# Patient Record
Sex: FEMALE | Race: WHITE | Employment: OTHER | URBAN - METROPOLITAN AREA
[De-identification: names, ages, dates, MRNs, and addresses within clinical notes are randomized per-mention and may not be internally consistent; named-entity substitution may affect disease eponyms.]

---

## 2019-12-27 ENCOUNTER — HOSPITAL ENCOUNTER (OUTPATIENT)
Dept: MRI IMAGING | Age: 84
Discharge: HOME | End: 2019-12-27
Attending: ORTHOPAEDIC SURGERY

## 2019-12-27 DIAGNOSIS — M54.50 LOW BACK PAIN: ICD-10-CM

## 2019-12-27 DIAGNOSIS — M54.6 PAIN IN THORACIC SPINE: ICD-10-CM

## 2020-01-10 ENCOUNTER — HOSPITAL ENCOUNTER (OUTPATIENT)
Dept: MAMMOGRAPHY | Age: 85
Discharge: HOME | End: 2020-01-10
Payer: MEDICARE

## 2020-01-10 DIAGNOSIS — M81.0 OSTEOPOROSIS: ICD-10-CM

## 2020-01-10 PROCEDURE — 77081 DXA BONE DENSITY APPENDICULR: CPT

## 2020-07-01 ENCOUNTER — APPOINTMENT (OUTPATIENT)
Dept: CT IMAGING | Age: 85
End: 2020-07-01
Attending: EMERGENCY MEDICINE
Payer: MEDICARE

## 2020-07-01 ENCOUNTER — HOSPITAL ENCOUNTER (EMERGENCY)
Age: 85
Discharge: HOME | End: 2020-07-01
Attending: EMERGENCY MEDICINE
Payer: MEDICARE

## 2020-07-01 VITALS
BODY MASS INDEX: 25.21 KG/M2 | HEART RATE: 69 BPM | OXYGEN SATURATION: 98 % | DIASTOLIC BLOOD PRESSURE: 92 MMHG | SYSTOLIC BLOOD PRESSURE: 160 MMHG | RESPIRATION RATE: 21 BRPM | TEMPERATURE: 98.1 F | WEIGHT: 160.94 LBS

## 2020-07-01 DIAGNOSIS — R42 VERTIGO: ICD-10-CM

## 2020-07-01 DIAGNOSIS — M54.50 ACUTE MIDLINE LOW BACK PAIN WITHOUT SCIATICA: Primary | ICD-10-CM

## 2020-07-01 PROCEDURE — 74011000250 HC RX REV CODE- 250: Performed by: EMERGENCY MEDICINE

## 2020-07-01 PROCEDURE — 72131 CT LUMBAR SPINE W/O DYE: CPT

## 2020-07-01 PROCEDURE — 99285 EMERGENCY DEPT VISIT HI MDM: CPT

## 2020-07-01 PROCEDURE — 74011250637 HC RX REV CODE- 250/637: Performed by: EMERGENCY MEDICINE

## 2020-07-01 PROCEDURE — 93005 ELECTROCARDIOGRAM TRACING: CPT

## 2020-07-01 RX ORDER — ACETAMINOPHEN 325 MG/1
650 TABLET ORAL
Qty: 20 TAB | Refills: 0 | Status: SHIPPED | OUTPATIENT
Start: 2020-07-01 | End: 2020-12-23

## 2020-07-01 RX ORDER — LIDOCAINE 4 G/100G
1 PATCH TOPICAL EVERY 24 HOURS
Status: DISCONTINUED | OUTPATIENT
Start: 2020-07-01 | End: 2020-07-01 | Stop reason: HOSPADM

## 2020-07-01 RX ORDER — MECLIZINE HYDROCHLORIDE 25 MG/1
25 TABLET ORAL
Qty: 20 TAB | Refills: 0 | Status: SHIPPED | OUTPATIENT
Start: 2020-07-01 | End: 2020-07-01

## 2020-07-01 RX ORDER — LIDOCAINE 4 G/100G
1 PATCH TOPICAL EVERY 12 HOURS
Qty: 10 PATCH | Refills: 0 | Status: SHIPPED | OUTPATIENT
Start: 2020-07-01 | End: 2020-12-23

## 2020-07-01 RX ORDER — TRAMADOL HYDROCHLORIDE 50 MG/1
50 TABLET ORAL
Qty: 10 TAB | Refills: 0 | Status: SHIPPED | OUTPATIENT
Start: 2020-07-01 | End: 2020-07-04

## 2020-07-01 RX ORDER — TRAMADOL HYDROCHLORIDE 50 MG/1
50 TABLET ORAL
Status: COMPLETED | OUTPATIENT
Start: 2020-07-01 | End: 2020-07-01

## 2020-07-01 RX ORDER — ACETAMINOPHEN 325 MG/1
650 TABLET ORAL
Status: COMPLETED | OUTPATIENT
Start: 2020-07-01 | End: 2020-07-01

## 2020-07-01 RX ORDER — MECLIZINE HYDROCHLORIDE 25 MG/1
25 TABLET ORAL
Qty: 20 TAB | Refills: 0 | Status: SHIPPED | OUTPATIENT
Start: 2020-07-01 | End: 2020-07-02 | Stop reason: SDUPTHER

## 2020-07-01 RX ORDER — ACETAMINOPHEN 325 MG/1
650 TABLET ORAL
Qty: 20 TAB | Refills: 0 | Status: SHIPPED | OUTPATIENT
Start: 2020-07-01 | End: 2020-07-01

## 2020-07-01 RX ADMIN — TRAMADOL HYDROCHLORIDE 50 MG: 50 TABLET, FILM COATED ORAL at 17:57

## 2020-07-01 RX ADMIN — ACETAMINOPHEN 650 MG: 325 TABLET, FILM COATED ORAL at 16:36

## 2020-07-01 NOTE — ED TRIAGE NOTES
Pt presents to ED from assisted living with complaints of right lower back pain and fall. Unsure if pain was before or after fall.  Denies LOC

## 2020-07-01 NOTE — ED NOTES
AVS reviewed with pt/family who verbalized understanding. All pt concerns addressed and questions answered. Pt awaiting son to pick her up at this time.

## 2020-07-01 NOTE — DISCHARGE INSTRUCTIONS
Patient Education      You were seen for acute on chronic back pain. CT did not reveal any new signs of fractures. It just showed an old L2 compression fracture status post kyphoplasty. Her EKG came back normal with no signs of any heart issues. Your physical exam did not reveal any signs of a stroke. Regarding the dizziness that you have been having off-and-on, most likely due to peripheral vertigo. Take meclizine whenever needed. Back Pain: Care Instructions  Your Care Instructions     Back pain has many possible causes. It is often related to problems with muscles and ligaments of the back. It may also be related to problems with the nerves, discs, or bones of the back. Moving, lifting, standing, sitting, or sleeping in an awkward way can strain the back. Sometimes you don't notice the injury until later. Arthritis is another common cause of back pain. Although it may hurt a lot, back pain usually improves on its own within several weeks. Most people recover in 12 weeks or less. Using good home treatment and being careful not to stress your back can help you feel better sooner. Follow-up care is a key part of your treatment and safety. Be sure to make and go to all appointments, and call your doctor if you are having problems. It's also a good idea to know your test results and keep a list of the medicines you take. How can you care for yourself at home? · Sit or lie in positions that are most comfortable and reduce your pain. Try one of these positions when you lie down:  ? Lie on your back with your knees bent and supported by large pillows. ? Lie on the floor with your legs on the seat of a sofa or chair. ? Lie on your side with your knees and hips bent and a pillow between your legs. ? Lie on your stomach if it does not make pain worse. · Do not sit up in bed, and avoid soft couches and twisted positions. Bed rest can help relieve pain at first, but it delays healing.  Avoid bed rest after the first day of back pain. · Change positions every 30 minutes. If you must sit for long periods of time, take breaks from sitting. Get up and walk around, or lie in a comfortable position. · Try using a heating pad on a low or medium setting for 15 to 20 minutes every 2 or 3 hours. Try a warm shower in place of one session with the heating pad. · You can also try an ice pack for 10 to 15 minutes every 2 to 3 hours. Put a thin cloth between the ice pack and your skin. · Take pain medicines exactly as directed. ? If the doctor gave you a prescription medicine for pain, take it as prescribed. ? If you are not taking a prescription pain medicine, ask your doctor if you can take an over-the-counter medicine. · Take short walks several times a day. You can start with 5 to 10 minutes, 3 or 4 times a day, and work up to longer walks. Walk on level surfaces and avoid hills and stairs until your back is better. · Return to work and other activities as soon as you can. Continued rest without activity is usually not good for your back. · To prevent future back pain, do exercises to stretch and strengthen your back and stomach. Learn how to use good posture, safe lifting techniques, and proper body mechanics. When should you call for help? Call your doctor now or seek immediate medical care if:  · You have new or worsening numbness in your legs. · You have new or worsening weakness in your legs. (This could make it hard to stand up.)  · You lose control of your bladder or bowels. Watch closely for changes in your health, and be sure to contact your doctor if:  · You have a fever, lose weight, or don't feel well. · You do not get better as expected. Where can you learn more? Go to http://www.[employer].com/  Enter I594 in the search box to learn more about \"Back Pain: Care Instructions. \"  Current as of: March 2, 2020               Content Version: 12.5  © 5862-1596 [employer]. Care instructions adapted under license by [employer] (which disclaims liability or warranty for this information). If you have questions about a medical condition or this instruction, always ask your healthcare professional. [address] any warranty or liability for your use of this information.

## 2020-07-01 NOTE — ED PROVIDER NOTES
EMERGENCY DEPARTMENT HISTORY AND PHYSICAL EXAM      Date: 7/1/2020  Patient Name: Keeley Gonzalez  Patient Age and Sex: 80 y.o. female     History of Presenting Illness     Chief Complaint   Patient presents with    Fall       History Provided By: Patient    HPI: Keeley Gonzalez is a 5year-old female with a history of kyphoscoliosis, rheumatoid arthritis, presenting with back pain. Patient states that over the past 3 weeks has been having lower back pain worse with movement. States that yesterday she had gotten up in the middle of the night because she was having terrible back pain and when she got up, the pain was bad and brought her to her knees. Patient states that she fell to the ground because of the pain. Did experience some dizziness that she states felt like her equilibrium was off. Patient states that she has had this problem twice before but no dizziness today. Because of the back pain the assisted living home decided to bring her to the ER. EMS reported no issues for them. Her [place] score was 0 and her glucose was normal at 103. She has been alert and talkative for them. Has been just complaining of back pain. According to them, daughter did state that she had some lumbar surgery many years ago. There are no other complaints, changes, or physical findings at this time. PCP: Edison Waite MD    No current facility-administered medications on file prior to encounter. Current Outpatient Medications on File Prior to Encounter   Medication Sig Dispense Refill    calcium-vitamin D (OYSTER SHELL) 500 mg(1,250mg) -200 unit per tablet Take 1 Tab by mouth two (2) times daily (with meals). 60 Tab 1    polyethylene glycol (MIRALAX) 17 gram packet Take 1 Packet by mouth daily. 30 Each 1    levothyroxine (SYNTHROID) 125 mcg tablet Take 1 Tab by mouth Daily (before breakfast). 30 Tab 1    carvedilol (COREG) 3.125 mg tablet Take 1 Tab by mouth two (2) times daily (with meals).  61 Tab 1    [DISCONTINUED] acetaminophen (TYLENOL) 325 mg tablet Take 2 Tabs by mouth every four (4) hours as needed. 40 Tab 0    [DISCONTINUED] oxyCODONE-acetaminophen (PERCOCET) 5-325 mg per tablet Take 1 Tab by mouth every six (6) hours as needed. Max Daily Amount: 4 Tabs. 30 Tab 0       Past History     Past Medical History:  Past Medical History:   Diagnosis Date    Arthritis     Broken leg     Right leg    Dyspnea     Enlarged heart     Fracture     Hypercholesterolemia     Hyperglycemia     Hypothyroidism     Kyphoscoliosis     Malignant neoplasm (HCC)     of kidney, except pelvis    Obstructive sleep apnea     Osteoarthritis     Pain, abdominal     RA (rheumatoid arthritis) (HCC)     Renal cell carcinoma (HCC)        Past Surgical History:  Past Surgical History:   Procedure Laterality Date    ENDOSCOPY, COLON, DIAGNOSTIC      HX BREAST LUMPECTOMY      x 4, No cancer found    HX CARPAL TUNNEL RELEASE      Left hand    HX CATARACT REMOVAL      both eyes    HX NEPHRECTOMY      kidney cancer    HX PREMALIG/BENIGN SKIN LESION EXCISION      precancerous growth removed from back; 2 masses removed from hand and arm    HX THYROIDECTOMY      XR ARTHRO SHOULDER LT       both shoulders       Family History:  Family History   Problem Relation Age of Onset    Colon Cancer Mother     Diabetes Father     Pacemaker Brother     Heart Attack Brother     Diabetes Brother     Heart Disease Brother        Social History:  Social History     Tobacco Use    Smoking status: Former Smoker     Packs/day: 0.75     Years: 9.00     Pack years: 6.75     Last attempt to quit: 1962     Years since quittin.5    Smokeless tobacco: Never Used    Tobacco comment: secondhand smoke exposure for many years from    Substance Use Topics    Alcohol use: Yes     Comment: Very seldom.  Drug use: No       Allergies:   Allergies   Allergen Reactions    Ismo [Isosorbide Mononitrate] Not Reported This Time    Penicillins Unknown (comments)    Plaquenil [Hydroxychloroquine] Other (comments)     Lost a lot of her skin. Really bad.  Prednisone Itching     Very itchy. Review of Systems   Review of Systems   Constitutional: Negative for chills and fever. Respiratory: Negative for cough and shortness of breath. Cardiovascular: Negative for chest pain. Gastrointestinal: Negative for abdominal pain, constipation, diarrhea, nausea and vomiting. Genitourinary: Negative for dysuria, frequency and hematuria. Musculoskeletal: Positive for back pain. Negative for neck pain and neck stiffness. Neurological: Positive for dizziness. Negative for weakness and numbness. All other systems reviewed and are negative. Physical Exam   Physical Exam  Vitals signs and nursing note reviewed. Constitutional:       Appearance: She is well-developed. HENT:      Head: Normocephalic and atraumatic. Eyes:      Conjunctiva/sclera: Conjunctivae normal.   Neck:      Musculoskeletal: Normal range of motion and neck supple. Cardiovascular:      Rate and Rhythm: Normal rate and regular rhythm. Pulmonary:      Effort: Pulmonary effort is normal. No respiratory distress. Breath sounds: Normal breath sounds. Abdominal:      General: There is no distension. Palpations: Abdomen is soft. Tenderness: There is no abdominal tenderness. Musculoskeletal: Normal range of motion. Comments: Tender to palpation over her entire lumbar spine. No tenderness paraspinal over the muscles. Pain elicited from going from supine to sitting in bed. Skin:     General: Skin is warm and dry. Neurological:      General: No focal deficit present. Mental Status: She is alert and oriented to person, place, and time. Mental status is at baseline. Comments: Patient has 5 out of 5 strength in all 4 extremities, heel-to-shin is normal.  Finger-to-nose intact.    Psychiatric:         Mood and Affect: Mood normal.          Diagnostic Study Results     Labs -     Recent Results (from the past 12 hour(s))   EKG, 12 LEAD, INITIAL    Collection Time: 07/01/20  4:47 PM   Result Value Ref Range    Ventricular Rate 70 BPM    Atrial Rate 72 BPM    QRS Duration 74 ms    Q-T Interval 362 ms    QTC Calculation (Bezet) 390 ms    Calculated R Axis 30 degrees    Calculated T Axis -4 degrees    Diagnosis       Atrial fibrillation  Septal infarct , age undetermined  When compared with ECG of 05-OCT-2016 16:50,  Atrial fibrillation has replaced Sinus rhythm  Septal infarct is now present         Radiologic Studies -   CT SPINE LUMB WO CONT   Final Result   IMPRESSION: No acute vertebral compression fracture demonstrated. Remote L2   vertebral compression fracture and degenerative findings as above. CT Results  (Last 48 hours)               07/01/20 1630  CT SPINE LUMB WO CONT Final result    Impression:  IMPRESSION: No acute vertebral compression fracture demonstrated. Remote L2   vertebral compression fracture and degenerative findings as above. Narrative:  INDICATION: Status post fall. Right lower back pain. COMPARISON: MRI 10/4/2016, kyphoplasty images 10/5/2016       EXAM: Axial CT images of the lumbar spine with coronal and sagittal   reformations. CT dose reduction was achieved through use of a standardized   protocol tailored for this examination and automatic exposure control for dose   modulation. FINDINGS: The bones are severely osteopenic. Kyphoplasty cement is demonstrated   in the L2 vertebral body with mild-moderate loss of disc height involving the   superior endplate region. The other vertebral body heights are normal. Minimal   kyphotic inclination is shown at L1-2 with otherwise anatomic alignment. No paraspinal soft tissue mass is shown. The visualized superior portions of the   sacrum appear normal, with demonstration of mild bilateral SI joint   osteoarthrosis. There is mild disc space narrowing at L1-2, borderline-mild narrowing at L3-4   and L4-5, and moderate narrowing at L5-S1. Vacuum phenomenon, some endplate   sclerosis and tiny subendplate cysts are shown at L5-S1. There is moderate   bilateral facet osteoarthrosis at L5-S1 and mild osteoarthrosis at the other   visualized lumbar and lower thoracic spinal levels. No osseous canal stenosis is shown. Mild degenerative canal stenosis is shown at   L3-4, moderate stenosis at L4-5 and mild-moderate stenosis at L5-S1. There is   mild bilateral foraminal narrowing at L5-S1. CXR Results  (Last 48 hours)    None            Medical Decision Making   I am the first provider for this patient. I reviewed the vital signs, available nursing notes, past medical history, past surgical history, family history and social history. Vital Signs-Reviewed the patient's vital signs. Patient Vitals for the past 12 hrs:   Temp Pulse Resp BP SpO2   07/01/20 1600  69 21 (!) 160/92 98 %   07/01/20 1551 98.1 °F (36.7 °C) 67 18 (!) 161/93 99 %       Records Reviewed: Nursing Notes and Old Medical Records    Provider Notes (Medical Decision Making):   Patient presenting with lower back pain is been going on for the past 3 weeks but worse enough now to bring her to a fall. Also describing some dizziness and vertigo though not happening right now. She has a benign physical exam not concerning for any central cause of her vertigo. Will get CT of her lumbar spine and continue to monitor her pain. No active vertigo that needs medication or work-up at this point. ED Course:   Initial assessment performed. The patients presenting problems have been discussed, and they are in agreement with the care plan formulated and outlined with them. I have encouraged them to ask questions as they arise throughout their visit.     ED Course as of Jul 01 2210   Wed Jul 01, 2020   1701 Spoke with patient's daughter who reiterated the same thing. I told her we will check an EKG to make sure her heart was okay. Patient's daughter is out of town so son will be the one taking her out. [JS]   1701 EKG interpreted by me. Shows atrial fibrillation with a rate of 70. No ST elevations depressions concerning for ischemia. [JS]      ED Course User Index  [JS] Bianca Lema MD     Critical Care Time:   0    Disposition:  Discharge Note:  The patient has been re-evaluated and is ready for discharge. Reviewed available results with patient. Counseled patient on diagnosis and care plan. Patient has expressed understanding, and all questions have been answered. Patient agrees with plan and agrees to follow up as recommended, or to return to the ED if their symptoms worsen. Discharge instructions have been provided and explained to the patient, along with reasons to return to the ED. PLAN:  Discharge Medication List as of 7/1/2020  5:24 PM      START taking these medications    Details   meclizine (ANTIVERT) 25 mg tablet Take 1 Tab by mouth three (3) times daily as needed for Dizziness., Normal, Disp-20 Tab, R-0         CONTINUE these medications which have CHANGED    Details   acetaminophen (TYLENOL) 325 mg tablet Take 2 Tabs by mouth every six (6) hours as needed for Pain., Normal, Disp-20 Tab, R-0         CONTINUE these medications which have NOT CHANGED    Details   calcium-vitamin D (OYSTER SHELL) 500 mg(1,250mg) -200 unit per tablet Take 1 Tab by mouth two (2) times daily (with meals). , Print, Disp-60 Tab, R-1      polyethylene glycol (MIRALAX) 17 gram packet Take 1 Packet by mouth daily. , Print, Disp-30 Each, R-1      levothyroxine (SYNTHROID) 125 mcg tablet Take 1 Tab by mouth Daily (before breakfast). , Print, Disp-30 Tab, R-1      carvedilol (COREG) 3.125 mg tablet Take 1 Tab by mouth two (2) times daily (with meals). , Print, Disp-60 Tab, R-1      oxyCODONE-acetaminophen (PERCOCET) 5-325 mg per tablet Take 1 Tab by mouth every six (6) hours as needed. Max Daily Amount: 4 Tabs., Print, Disp-30 Tab, R-0           2. Follow-up Information     Follow up With Specialties Details Why Contact Info    Yoon Grubbs MD Internal Medicine  As needed [facility]  [place] [phone]  [phone]          3. Return to ED if worse     Diagnosis     Clinical Impression:   1. Acute midline low back pain without sciatica    2. Vertigo        Attestations:    Chelita Penny M.D. Please note that this dictation was completed with [employer], the [employer] voice recognition software. Quite often unanticipated grammatical, syntax, homophones, and other interpretive errors are inadvertently transcribed by the computer software. Please disregard these errors. Please excuse any errors that have escaped final proofreading. Thank you.

## 2020-07-02 LAB
ATRIAL RATE: 72 BPM
CALCULATED R AXIS, ECG10: 30 DEGREES
CALCULATED T AXIS, ECG11: -4 DEGREES
DIAGNOSIS, 93000: NORMAL
Q-T INTERVAL, ECG07: 362 MS
QRS DURATION, ECG06: 74 MS
QTC CALCULATION (BEZET), ECG08: 390 MS
VENTRICULAR RATE, ECG03: 70 BPM

## 2020-07-02 RX ORDER — MECLIZINE HYDROCHLORIDE 25 MG/1
25 TABLET ORAL
Qty: 20 TAB | Refills: 0 | Status: SHIPPED | OUTPATIENT
Start: 2020-07-02 | End: 2020-12-23

## 2020-12-22 ENCOUNTER — APPOINTMENT (OUTPATIENT)
Dept: GENERAL RADIOLOGY | Age: 85
DRG: 273 | End: 2020-12-22
Attending: EMERGENCY MEDICINE
Payer: MEDICARE

## 2020-12-22 ENCOUNTER — HOSPITAL ENCOUNTER (INPATIENT)
Age: 85
LOS: 3 days | Discharge: HOME HEALTH | DRG: 273 | End: 2020-12-25
Attending: EMERGENCY MEDICINE | Admitting: INTERNAL MEDICINE
Payer: MEDICARE

## 2020-12-22 DIAGNOSIS — I48.92 ATRIAL FLUTTER, UNSPECIFIED TYPE (HCC): ICD-10-CM

## 2020-12-22 DIAGNOSIS — I48.92 ATRIAL FLUTTER WITH RAPID VENTRICULAR RESPONSE (HCC): Primary | ICD-10-CM

## 2020-12-22 DIAGNOSIS — F03.91 DEMENTIA WITH BEHAVIORAL DISTURBANCE, UNSPECIFIED DEMENTIA TYPE: ICD-10-CM

## 2020-12-22 DIAGNOSIS — I42.0 DILATED CARDIOMYOPATHY (HCC): ICD-10-CM

## 2020-12-22 LAB
ALBUMIN SERPL-MCNC: 3.4 G/DL (ref 3.5–5)
ALBUMIN/GLOB SERPL: 0.9 {RATIO} (ref 1.1–2.2)
ALP SERPL-CCNC: 108 U/L (ref 45–117)
ALT SERPL-CCNC: 18 U/L (ref 12–78)
ANION GAP SERPL CALC-SCNC: 2 MMOL/L (ref 5–15)
APPEARANCE UR: CLEAR
AST SERPL-CCNC: 17 U/L (ref 15–37)
BACTERIA URNS QL MICRO: NEGATIVE /HPF
BASOPHILS # BLD: 0.1 K/UL (ref 0–0.1)
BASOPHILS NFR BLD: 1 % (ref 0–1)
BILIRUB SERPL-MCNC: 0.4 MG/DL (ref 0.2–1)
BILIRUB UR QL: NEGATIVE
BUN SERPL-MCNC: 19 MG/DL (ref 6–20)
BUN/CREAT SERPL: 14 (ref 12–20)
CALCIUM SERPL-MCNC: 8.9 MG/DL (ref 8.5–10.1)
CHLORIDE SERPL-SCNC: 104 MMOL/L (ref 97–108)
CO2 SERPL-SCNC: 30 MMOL/L (ref 21–32)
COLOR UR: ABNORMAL
COMMENT, HOLDF: NORMAL
CREAT SERPL-MCNC: 1.37 MG/DL (ref 0.55–1.02)
DIFFERENTIAL METHOD BLD: NORMAL
EOSINOPHIL # BLD: 0.1 K/UL (ref 0–0.4)
EOSINOPHIL NFR BLD: 2 % (ref 0–7)
EPITH CASTS URNS QL MICRO: ABNORMAL /LPF
ERYTHROCYTE [DISTWIDTH] IN BLOOD BY AUTOMATED COUNT: 13.3 % (ref 11.5–14.5)
GLOBULIN SER CALC-MCNC: 3.9 G/DL (ref 2–4)
GLUCOSE SERPL-MCNC: 74 MG/DL (ref 65–100)
GLUCOSE UR STRIP.AUTO-MCNC: NEGATIVE MG/DL
HCT VFR BLD AUTO: 41.2 % (ref 35–47)
HGB BLD-MCNC: 13.3 G/DL (ref 11.5–16)
HGB UR QL STRIP: NEGATIVE
HYALINE CASTS URNS QL MICRO: ABNORMAL /LPF (ref 0–5)
IMM GRANULOCYTES # BLD AUTO: 0 K/UL (ref 0–0.04)
IMM GRANULOCYTES NFR BLD AUTO: 0 % (ref 0–0.5)
KETONES UR QL STRIP.AUTO: NEGATIVE MG/DL
LEUKOCYTE ESTERASE UR QL STRIP.AUTO: ABNORMAL
LYMPHOCYTES # BLD: 1.2 K/UL (ref 0.8–3.5)
LYMPHOCYTES NFR BLD: 18 % (ref 12–49)
MCH RBC QN AUTO: 29.5 PG (ref 26–34)
MCHC RBC AUTO-ENTMCNC: 32.3 G/DL (ref 30–36.5)
MCV RBC AUTO: 91.4 FL (ref 80–99)
MONOCYTES # BLD: 0.5 K/UL (ref 0–1)
MONOCYTES NFR BLD: 8 % (ref 5–13)
NEUTS SEG # BLD: 4.7 K/UL (ref 1.8–8)
NEUTS SEG NFR BLD: 71 % (ref 32–75)
NITRITE UR QL STRIP.AUTO: NEGATIVE
NRBC # BLD: 0 K/UL (ref 0–0.01)
NRBC BLD-RTO: 0 PER 100 WBC
PH UR STRIP: 6 [PH] (ref 5–8)
PLATELET # BLD AUTO: 290 K/UL (ref 150–400)
PMV BLD AUTO: 9.6 FL (ref 8.9–12.9)
POTASSIUM SERPL-SCNC: 4.3 MMOL/L (ref 3.5–5.1)
PROT SERPL-MCNC: 7.3 G/DL (ref 6.4–8.2)
PROT UR STRIP-MCNC: NEGATIVE MG/DL
RBC # BLD AUTO: 4.51 M/UL (ref 3.8–5.2)
RBC #/AREA URNS HPF: ABNORMAL /HPF (ref 0–5)
SAMPLES BEING HELD,HOLD: NORMAL
SODIUM SERPL-SCNC: 136 MMOL/L (ref 136–145)
SP GR UR REFRACTOMETRY: 1.01 (ref 1–1.03)
TROPONIN I SERPL-MCNC: <0.05 NG/ML
TSH SERPL DL<=0.05 MIU/L-ACNC: 0.53 UIU/ML (ref 0.36–3.74)
UA: UC IF INDICATED,UAUC: ABNORMAL
UROBILINOGEN UR QL STRIP.AUTO: 0.2 EU/DL (ref 0.2–1)
WBC # BLD AUTO: 6.6 K/UL (ref 3.6–11)
WBC URNS QL MICRO: ABNORMAL /HPF (ref 0–4)

## 2020-12-22 PROCEDURE — 81001 URINALYSIS AUTO W/SCOPE: CPT

## 2020-12-22 PROCEDURE — 84443 ASSAY THYROID STIM HORMONE: CPT

## 2020-12-22 PROCEDURE — 96366 THER/PROPH/DIAG IV INF ADDON: CPT

## 2020-12-22 PROCEDURE — 94761 N-INVAS EAR/PLS OXIMETRY MLT: CPT

## 2020-12-22 PROCEDURE — 74011250636 HC RX REV CODE- 250/636: Performed by: EMERGENCY MEDICINE

## 2020-12-22 PROCEDURE — 74011000250 HC RX REV CODE- 250: Performed by: EMERGENCY MEDICINE

## 2020-12-22 PROCEDURE — 96365 THER/PROPH/DIAG IV INF INIT: CPT

## 2020-12-22 PROCEDURE — 93005 ELECTROCARDIOGRAM TRACING: CPT

## 2020-12-22 PROCEDURE — 71045 X-RAY EXAM CHEST 1 VIEW: CPT

## 2020-12-22 PROCEDURE — 80053 COMPREHEN METABOLIC PANEL: CPT

## 2020-12-22 PROCEDURE — 36415 COLL VENOUS BLD VENIPUNCTURE: CPT

## 2020-12-22 PROCEDURE — 85025 COMPLETE CBC W/AUTO DIFF WBC: CPT

## 2020-12-22 PROCEDURE — 74011250636 HC RX REV CODE- 250/636: Performed by: INTERNAL MEDICINE

## 2020-12-22 PROCEDURE — 99285 EMERGENCY DEPT VISIT HI MDM: CPT

## 2020-12-22 PROCEDURE — 84484 ASSAY OF TROPONIN QUANT: CPT

## 2020-12-22 PROCEDURE — 96376 TX/PRO/DX INJ SAME DRUG ADON: CPT

## 2020-12-22 RX ORDER — SODIUM CHLORIDE 0.9 % (FLUSH) 0.9 %
5-40 SYRINGE (ML) INJECTION EVERY 8 HOURS
Status: DISCONTINUED | OUTPATIENT
Start: 2020-12-22 | End: 2020-12-25 | Stop reason: HOSPADM

## 2020-12-22 RX ORDER — ENOXAPARIN SODIUM 100 MG/ML
1 INJECTION SUBCUTANEOUS EVERY 24 HOURS
Status: COMPLETED | OUTPATIENT
Start: 2020-12-22 | End: 2020-12-22

## 2020-12-22 RX ORDER — FACIAL-BODY WIPES
10 EACH TOPICAL DAILY PRN
Status: DISCONTINUED | OUTPATIENT
Start: 2020-12-22 | End: 2020-12-25 | Stop reason: HOSPADM

## 2020-12-22 RX ORDER — DILTIAZEM HYDROCHLORIDE 5 MG/ML
0.35 INJECTION INTRAVENOUS
Status: COMPLETED | OUTPATIENT
Start: 2020-12-22 | End: 2020-12-22

## 2020-12-22 RX ORDER — CARVEDILOL 3.12 MG/1
3.12 TABLET ORAL 2 TIMES DAILY WITH MEALS
Status: DISCONTINUED | OUTPATIENT
Start: 2020-12-23 | End: 2020-12-25 | Stop reason: HOSPADM

## 2020-12-22 RX ORDER — FUROSEMIDE 10 MG/ML
40 INJECTION INTRAMUSCULAR; INTRAVENOUS DAILY
Status: DISCONTINUED | OUTPATIENT
Start: 2020-12-23 | End: 2020-12-24

## 2020-12-22 RX ORDER — ACETAMINOPHEN 325 MG/1
650 TABLET ORAL
Status: DISCONTINUED | OUTPATIENT
Start: 2020-12-22 | End: 2020-12-25 | Stop reason: HOSPADM

## 2020-12-22 RX ORDER — ENOXAPARIN SODIUM 100 MG/ML
30 INJECTION SUBCUTANEOUS DAILY
Status: DISCONTINUED | OUTPATIENT
Start: 2020-12-23 | End: 2020-12-22

## 2020-12-22 RX ORDER — ACETAMINOPHEN 650 MG/1
650 SUPPOSITORY RECTAL
Status: DISCONTINUED | OUTPATIENT
Start: 2020-12-22 | End: 2020-12-25 | Stop reason: HOSPADM

## 2020-12-22 RX ORDER — ONDANSETRON 2 MG/ML
4 INJECTION INTRAMUSCULAR; INTRAVENOUS
Status: DISCONTINUED | OUTPATIENT
Start: 2020-12-22 | End: 2020-12-25 | Stop reason: HOSPADM

## 2020-12-22 RX ORDER — SODIUM CHLORIDE 0.9 % (FLUSH) 0.9 %
5-40 SYRINGE (ML) INJECTION AS NEEDED
Status: DISCONTINUED | OUTPATIENT
Start: 2020-12-22 | End: 2020-12-25 | Stop reason: SDUPTHER

## 2020-12-22 RX ORDER — LEVOTHYROXINE SODIUM 125 UG/1
125 TABLET ORAL
Status: DISCONTINUED | OUTPATIENT
Start: 2020-12-23 | End: 2020-12-23

## 2020-12-22 RX ORDER — DILTIAZEM HYDROCHLORIDE 5 MG/ML
15 INJECTION INTRAVENOUS
Status: COMPLETED | OUTPATIENT
Start: 2020-12-22 | End: 2020-12-22

## 2020-12-22 RX ORDER — POLYETHYLENE GLYCOL 3350 17 G/17G
17 POWDER, FOR SOLUTION ORAL DAILY
Status: DISCONTINUED | OUTPATIENT
Start: 2020-12-23 | End: 2020-12-25 | Stop reason: HOSPADM

## 2020-12-22 RX ADMIN — ENOXAPARIN SODIUM 70 MG: 40 INJECTION SUBCUTANEOUS at 21:37

## 2020-12-22 RX ADMIN — DEXTROSE MONOHYDRATE 2.5 MG/HR: 50 INJECTION, SOLUTION INTRAVENOUS at 13:37

## 2020-12-22 RX ADMIN — DEXTROSE MONOHYDRATE 15 MG/HR: 50 INJECTION, SOLUTION INTRAVENOUS at 20:19

## 2020-12-22 RX ADMIN — DEXTROSE MONOHYDRATE 10 MG/HR: 50 INJECTION, SOLUTION INTRAVENOUS at 15:17

## 2020-12-22 RX ADMIN — DILTIAZEM HYDROCHLORIDE 15 MG: 5 INJECTION INTRAVENOUS at 13:37

## 2020-12-22 RX ADMIN — SODIUM CHLORIDE 500 ML: 9 INJECTION, SOLUTION INTRAVENOUS at 16:40

## 2020-12-22 RX ADMIN — DILTIAZEM HYDROCHLORIDE 25.5 MG: 5 INJECTION INTRAVENOUS at 16:40

## 2020-12-22 RX ADMIN — Medication 10 ML: at 20:20

## 2020-12-22 RX ADMIN — Medication 10 ML: at 16:32

## 2020-12-22 NOTE — Clinical Note
TRANSFER - IN REPORT:     Verbal report received from: [facility] staff. Report consisted of patient's Situation, Background, Assessment and   Recommendations(SBAR). Opportunity for questions and clarification was provided. Assessment completed upon patient's arrival to unit and care assumed. Patient transported with a Registered Nurse.

## 2020-12-22 NOTE — Clinical Note
TRANSFER - OUT REPORT:     Verbal report given to: John Wallis. Report consisted of patient's Situation, Background, Assessment and   Recommendations(SBAR). Opportunity for questions and clarification was provided. Patient transported with a Registered Nurse, Monitor and Oxygen. Oxygen used for patient = nasal cannula, @ 2 - 6 Liters. Patient transported to: IVCU bed 2153.

## 2020-12-22 NOTE — ROUTINE PROCESS
Bedside and Verbal shift change report given to Mati Bonilla RN (oncoming nurse) by Sasha Solis RN (offgoing nurse). Report included the following information SBAR, Kardex, Intake/Output and MAR.

## 2020-12-22 NOTE — Clinical Note
Sheath #1: Sheath: inserted. Sheath inserted/placed in the right femoral  vein. Hemostasis achieved.  8fr RFV

## 2020-12-22 NOTE — ED TRIAGE NOTES
Pt reports to the ED via EMS with c/o of SOB and tachycardia(124bpm) reported by her caregiver that started earlier today. Pt is placed on the monitor x 3, placed in position of comfort with call bell within reach.

## 2020-12-22 NOTE — H&P
Hospitalist Admission Note    NAME:  Alexys Kendrick   :   1930   MRN:   [id number]     Date of admit:     PCP: Denice Reynolds MD    Assessment/Plan:     Atrial flutter with RVR POA  Acute on chronic systolic congestive heart failure LVEF 40 to 45% in 2016 POA  No prior history of atrial fib or flutter  Question during admission in 2016, ultimately seen by [facility] and felt to be sinus tach  She was started on low-dose Coreg  Past 24 hours reportedly increasingly short of breath, no chest pain  Brought to ER  Found to be in atrial flutter with RVR rates to 124  Now improved on Cardizem drip, heart rates in the 80s, means of flutter  Admit to stepdown  Resume p.o. Coreg  Lovenox 1 mg/kg sub-q  Cardiology consult, I spoke with [facility] earlier  Check echocardiogram  IV Lasix  Check proBNP  Normal TSH  Spoke with daughter, she is not sure how aggressive she wants to be with treatments  I asked her to speak with cardiology and decide on best options for her mom    Hypothyroidism on replacement POA  We will continue, current TSH 0.53    Stage 4 chronic kidney disease  Current GFR approximately 26.5  Creatinine approximately 1.3    Status post left nephrectomy for renal cell cancer    Possible early dementia  Not formally diagnosed, but daughter says confusion present  Oriented x2 in ED, daughter notes increasingly confused, maribel short-term  Normal TSH  Check B12 level    KAREN per daughter  Prior not CPAP, not using per daughter    Overweight POA Body mass index is 25.2 kg/m². Given the patient's current clinical presentation, I have a high level of concern for decompensation if discharged from the emergency department. My assessment of this patient's clinical condition and my plan of care is as noted above.     DVT prophylaxis with lovenox 1 mg/kg    Code status: DNR/DNI, d/w daughter at length, spoke with her for 15 minutes by phone  NOK: Daughter Jennifer Arias    History CHIEF COMPLAINT: Brought in by family with SOB x 1 day, found to have atrial flutter with RVR    HISTORY OF PRESENT ILLNESS:    80-year-old female    Hard of hearing, oriented currently only x 2, could provide limited history  Spoke with daughter Alicia Lujan to get additional history by phone    Patient lives in independent living facility with assistance with meals  Daughter has someone who checks on her daily  Physical therapy works with her    Daughter notes no formal diagnosis with dementia, but increasing problems with short-term memory  Also very hard of hearing    Last admission to [address] [place] [address] was in 2016  Severe low back pain and L2 compression fracture  Underwent kyphoplasty  Seen by [facility]ology for question of atrial fib  Ultimately felt the strips represented sinus tachycardia, was prescribed low-dose Coreg  Echo at that time had an LVEF of 40 to 45%    Patient currently denies complaints, she was not sure why she was brought into the emergency room  Daughter says physical therapy went to work with her today, found to have a very rapid heart rate, sent to ER  She may have had some increased shortness of breath  She currently denies headache, chest pain, shortness of breath, nausea vomiting, diarrhea, cough, fevers  No prior history of atrial fibrillation noted upon chart review    Emergency room  Satting well on room air   EKG with atrial flutter with heart rates to 130  Chest x-ray with no airspace disease  Started on Cardizem drip  Eventually heart rate controlled, when I saw heart rates were in the 80s but remained in atrial flutter  Currently without complaints  We were called to admit the patient    Past Medical History:   Diagnosis Date    Arthritis     Broken leg     Right leg    Dyspnea     Enlarged heart     Fracture     Hypercholesterolemia     Hyperglycemia     Hypothyroidism     Kyphoscoliosis     Malignant neoplasm ([address]     of kidney, except pelvis    Obstructive sleep apnea     Osteoarthritis     Pain, abdominal     RA (rheumatoid arthritis) (HCC)     Renal cell carcinoma (HCC)         Past Surgical History:   Procedure Laterality Date    ENDOSCOPY, COLON, DIAGNOSTIC      HX BREAST LUMPECTOMY      x 4, No cancer found    HX CARPAL TUNNEL RELEASE      Left hand    HX CATARACT REMOVAL      both eyes    HX NEPHRECTOMY      kidney cancer    HX PREMALIG/BENIGN SKIN LESION EXCISION      precancerous growth removed from back; 2 masses removed from hand and arm    HX THYROIDECTOMY      HX THYROIDECTOMY      XR ARTHRO SHOULDER LT       both shoulders       Social History     Tobacco Use    Smoking status: Former Smoker     Packs/day: 0.75     Years: 9.00     Pack years: 6.75     Quit date: 1962     Years since quittin.0    Smokeless tobacco: Never Used    Tobacco comment: secondhand smoke exposure for many years from    Substance Use Topics    Alcohol use: Yes     Comment: Very seldom. Family History   Problem Relation Age of Onset    Colon Cancer Mother     Diabetes Father     Pacemaker Brother     Heart Attack Brother     Diabetes Brother     Heart Disease Brother         Allergies   Allergen Reactions    Ismo [Isosorbide Mononitrate] Not Reported This Time    Penicillins Unknown (comments)    Plaquenil [Hydroxychloroquine] Other (comments)     Lost a lot of her skin. Really bad.  Prednisone Itching     Very itchy. Prior to Admission medications    Medication Sig Start Date End Date Taking? Authorizing Provider   meclizine (ANTIVERT) 25 mg tablet Take 1 Tab by mouth three (3) times daily as needed for Dizziness. 20   Ronit Naranjo MD   lidocaine 4 % patch 1 Patch by TransDERmal route every twelve (12) hours every twelve (12) hours. 20   Nat Cha MD   acetaminophen (TYLENOL) 325 mg tablet Take 2 Tabs by mouth every six (6) hours as needed for Pain.  20   Nat Cha MD   calcium-vitamin D (OYSTER SHELL) 500 mg(1,250mg) -200 unit per tablet Take 1 Tab by mouth two (2) times daily (with meals). 10/7/16   Zelalem Adler MD   polyethylene glycol (MIRALAX) 17 gram packet Take 1 Packet by mouth daily. 10/7/16   Zelalem Adler MD   levothyroxine (SYNTHROID) 125 mcg tablet Take 1 Tab by mouth Daily (before breakfast). 10/7/16   Zelalem Adler MD   carvedilol (COREG) 3.125 mg tablet Take 1 Tab by mouth two (2) times daily (with meals).  10/7/16   Zelalem Adler MD       Review of symptoms:     POSITIVE= Bold  Negative = not bold  General:  fever, chills, sweats, generalized weakness  Eyes:    blurred vision, eye pain, double vision  ENT:    Coryza, sore throat, trouble swallowing  Respiratory:   cough, sputum, SOB  Cardiology:   chest pain, orthopnea, PND, edema  Gastrointestinal:  abdominal pain , N/V, diarrhea, constipation, melena or BRBPR  Genitourinary:  Urgency, dysuria, hematuria  Muskuloskeletal :  Joint redness, swelling or acute joint pain, myalgias  Hematology:  easy bruising, nose or gum bleeding  Dermatological: rash, ulceration  Endocrine:   Polyuria or polydipsia, heat or hold intolerance  Neurological:  Headache, focal motor or sensory changes     Speech difficulties, memory loss  Psychological: depression, agitation      Objective:   VITALS:    Patient Vitals for the past 24 hrs:   Temp Pulse Resp BP SpO2   20 1515  (!) 120 28 131/79 96 %   20 1415  (!) 120 24 119/73 96 %   20 1400  (!) 121 30 123/75 97 %   20 1345  (!) 115 30 107/72 97 %   20 1343  (!) 118 25 (!) 147/90    20 1326  (!) 118 22 (!) 130/99 97 %   20 1218     99 %   20 1215  (!) 124 21 (!) 129/99    20 1203 98.6 °F (37 °C) (!) 124 23 (!) 133/94 98 %     Temp (24hrs), Av.6 °F (37 °C), Min:98.6 °F (37 °C), Max:98.6 °F (37 °C)      O2 Device: Room air    Wt Readings from Last 12 Encounters:   20 73 kg (160 lb 15 oz)   10/04/16 81.6 kg (180 lb) 12/01/15 81.6 kg (180 lb)   03/01/12 79.8 kg (176 lb)   08/28/12 79.4 kg (175 lb)   04/06/12 77.1 kg (170 lb)         PHYSICAL EXAM:     I had a face to face encounter and independently examined this patient on 12/22/2020 as outlined below:    General:    Alert, cooperative in no distress     HEENT: Normocephalic, atraumatic    PERRL, Sclera no icterus    Nasal mucosa without masses or discharge  Hearing intact to voice    Oropharynx without erythema or exudate  No thrush  Pink MM  Neck:  No meningismus, trachea midline, no carotid bruits     Thyroid not enlarged, no nodules or tenderness  Lungs:   Crackles at bases bilaterally. No wheezing    No accessory muscle use or retractions. Heart:   Irregular rate and rhythm,  no murmur or gallop.    trace LE edema  Abdomen:   Soft, non-tender. Not distended. Bowel sounds normal.     No masses, No Hepatosplenomegaly, No Rebound or guarding  Lymph nodes: No cervical or inguinal MELECIO  Musculoskeletal:  No Joint swelling, erythema, warmth.  No Cyanosis or clubbing  Skin:      No rashes      Not Jaundiced   No nodules or thickening    Capillary refill normal  Neurologic: Alert and oriented to birthday and place, follows commands      Not oriented to year, current president and president elect    Cranial nerves 2 to 12 intact    Symmetric motor strength bilaterally       LAB DATA REVIEWED:    Recent Results (from the past 12 hour(s))   EKG, 12 LEAD, INITIAL    Collection Time: 12/22/20 12:12 PM   Result Value Ref Range    Ventricular Rate 124 BPM    Atrial Rate 248 BPM    QRS Duration 80 ms    Q-T Interval 288 ms    QTC Calculation (Bezet) 413 ms    Calculated P Axis -96 degrees    Calculated R Axis 26 degrees    Calculated T Axis 173 degrees    Diagnosis       Atrial flutter with 2:1 AV conduction  Low voltage QRS  Cannot rule out Anteroseptal infarct (cited on or before 22-DEC-2020)  When compared with ECG of 01-JUL-2020 16:47,  Atrial flutter has replaced Sinus rhythm  Vent. rate has increased BY  54 BPM  Questionable change in initial forces of Anterior leads  ST now depressed in Inferior leads  T wave inversion no longer evident in Inferior leads  Nonspecific T wave abnormality now evident in Lateral leads     CBC WITH AUTOMATED DIFF    Collection Time: 12/22/20 12:22 PM   Result Value Ref Range    WBC 6.6 3.6 - 11.0 K/uL    RBC 4.51 3.80 - 5.20 M/uL    HGB 13.3 11.5 - 16.0 g/dL    HCT 41.2 35.0 - 47.0 %    MCV 91.4 80.0 - 99.0 FL    MCH 29.5 26.0 - 34.0 PG    MCHC 32.3 30.0 - 36.5 g/dL    RDW 13.3 11.5 - 14.5 %    PLATELET 975 774 - 533 K/uL    MPV 9.6 8.9 - 12.9 FL    NRBC 0.0 0  WBC    ABSOLUTE NRBC 0.00 0.00 - 0.01 K/uL    NEUTROPHILS 71 32 - 75 %    LYMPHOCYTES 18 12 - 49 %    MONOCYTES 8 5 - 13 %    EOSINOPHILS 2 0 - 7 %    BASOPHILS 1 0 - 1 %    IMMATURE GRANULOCYTES 0 0.0 - 0.5 %    ABS. NEUTROPHILS 4.7 1.8 - 8.0 K/UL    ABS. LYMPHOCYTES 1.2 0.8 - 3.5 K/UL    ABS. MONOCYTES 0.5 0.0 - 1.0 K/UL    ABS. EOSINOPHILS 0.1 0.0 - 0.4 K/UL    ABS. BASOPHILS 0.1 0.0 - 0.1 K/UL    ABS. IMM. GRANS. 0.0 0.00 - 0.04 K/UL    DF AUTOMATED     METABOLIC PANEL, COMPREHENSIVE    Collection Time: 12/22/20 12:22 PM   Result Value Ref Range    Sodium 136 136 - 145 mmol/L    Potassium 4.3 3.5 - 5.1 mmol/L    Chloride 104 97 - 108 mmol/L    CO2 30 21 - 32 mmol/L    Anion gap 2 (L) 5 - 15 mmol/L    Glucose 74 65 - 100 mg/dL    BUN 19 6 - 20 MG/DL    Creatinine 1.37 (H) 0.55 - 1.02 MG/DL    BUN/Creatinine ratio 14 12 - 20      GFR est AA 44 (L) >60 ml/min/1.73m2    GFR est non-AA 36 (L) >60 ml/min/1.73m2    Calcium 8.9 8.5 - 10.1 MG/DL    Bilirubin, total 0.4 0.2 - 1.0 MG/DL    ALT (SGPT) 18 12 - 78 U/L    AST (SGOT) 17 15 - 37 U/L    Alk.  phosphatase 108 45 - 117 U/L    Protein, total 7.3 6.4 - 8.2 g/dL    Albumin 3.4 (L) 3.5 - 5.0 g/dL    Globulin 3.9 2.0 - 4.0 g/dL    A-G Ratio 0.9 (L) 1.1 - 2.2     SAMPLES BEING HELD    Collection Time: 12/22/20 12:22 PM   Result Value Ref Range    SAMPLES BEING HELD  2 RED, BLUE, SST     COMMENT        Add-on orders for these samples will be processed based on acceptable specimen integrity and analyte stability, which may vary by analyte. TROPONIN I    Collection Time: 12/22/20 12:22 PM   Result Value Ref Range    Troponin-I, Qt. <0.05 <0.05 ng/mL   TSH 3RD GENERATION    Collection Time: 12/22/20 12:22 PM   Result Value Ref Range    TSH 0.53 0.36 - 3.74 uIU/mL       EKG as read by me shows atrial flutter with 2-1 block, heart rate 124 slight ST depression in inferior leads    CXR read by radiology and reviewed by myself results:  Cardiomediastinal silhouette is stable. Lungs are clear bilaterally. Pleural  spaces are normal. Osseous structures are diffusely demineralized, but intact. IMPRESSION: No acute cardiopulmonary disease. I saw the patient personally, took a history and did a complete physical exam at the bedside. I performed complex decision making in coming up with a diagnostic and treatment plan for the patient. I reviewed the patient's past medical records, current laboratory and radiology results, and actual Xray films/EKG. I have also discussed this case with the involved ED physician.     Care Plan discussed with:    Patient, Family, ED Doc    Risk of deterioration:  High    Total Time Coordinating Admission:  65   minutes    Total Critical Care Time:         Maryam Palma MD

## 2020-12-22 NOTE — Clinical Note
Sheath #1: sheath exchanged for INTRODUCER IRWIN GARNETT 0.038 IN 3 MM 8 FRX60 CM DIL FAST-CATH. Hemostasis achieved.  8fr sheath RFV removed/ SEPT advanced over package wire/ aspirated and flushed

## 2020-12-22 NOTE — ED PROVIDER NOTES
EMERGENCY DEPARTMENT HISTORY AND PHYSICAL EXAM      Date: 12/22/2020  Patient Name: Alexys Kendrick    History of Presenting Illness     Chief Complaint   Patient presents with    Shortness of Breath       History Provided By: Patient    HPI: Alexys Kendrick, 80 y.o. female with a past medical history significant for CHF, hypertension, hyperlipidemia, hypothyroidism, rheumatoid arthritis, renal cell cancer presents to the ED with cc of moderate to severe shortness of breath and tachycardia over the last 24 hours. Patient is coming from home and her caregiver says her heart rate has been very elevated and she is having trouble breathing with exertion. She denies any significant chest pain, fevers, chills, productive cough, leg swelling, or any other associated symptoms. No other exacerbating ameliorating factors. There are no other complaints, changes, or physical findings at this time. PCP: Denice Reynolds MD    No current facility-administered medications on file prior to encounter. Current Outpatient Medications on File Prior to Encounter   Medication Sig Dispense Refill    meclizine (ANTIVERT) 25 mg tablet Take 1 Tab by mouth three (3) times daily as needed for Dizziness. 20 Tab 0    lidocaine 4 % patch 1 Patch by TransDERmal route every twelve (12) hours every twelve (12) hours. 10 Patch 0    acetaminophen (TYLENOL) 325 mg tablet Take 2 Tabs by mouth every six (6) hours as needed for Pain. 20 Tab 0    calcium-vitamin D (OYSTER SHELL) 500 mg(1,250mg) -200 unit per tablet Take 1 Tab by mouth two (2) times daily (with meals). 60 Tab 1    polyethylene glycol (MIRALAX) 17 gram packet Take 1 Packet by mouth daily. 30 Each 1    levothyroxine (SYNTHROID) 125 mcg tablet Take 1 Tab by mouth Daily (before breakfast). 30 Tab 1    carvedilol (COREG) 3.125 mg tablet Take 1 Tab by mouth two (2) times daily (with meals).  61 Tab 1       Past History     Past Medical History:  Past Medical History: Diagnosis Date    Arthritis     Broken leg     Right leg    Dyspnea     Enlarged heart     Fracture     Hypercholesterolemia     Hyperglycemia     Hypothyroidism     Kyphoscoliosis     Malignant neoplasm (HCC)     of kidney, except pelvis    Obstructive sleep apnea     Osteoarthritis     Pain, abdominal     RA (rheumatoid arthritis) ([address]     Renal cell carcinoma (HCC)        Past Surgical History:  Past Surgical History:   Procedure Laterality Date    ENDOSCOPY, COLON, DIAGNOSTIC      HX BREAST LUMPECTOMY      x 4, No cancer found    HX CARPAL TUNNEL RELEASE      Left hand    HX CATARACT REMOVAL      both eyes    HX NEPHRECTOMY      kidney cancer    HX PREMALIG/BENIGN SKIN LESION EXCISION      precancerous growth removed from back; 2 masses removed from hand and arm    HX THYROIDECTOMY      HX THYROIDECTOMY      XR ARTHRO SHOULDER LT       both shoulders       Family History:  Family History   Problem Relation Age of Onset    Colon Cancer Mother     Diabetes Father     Pacemaker Brother     Heart Attack Brother     Diabetes Brother     Heart Disease Brother        Social History:  Social History     Tobacco Use    Smoking status: Former Smoker     Packs/day: 0.75     Years: 9.00     Pack years: 6.75     Quit date: 1962     Years since quittin.0    Smokeless tobacco: Never Used    Tobacco comment: secondhand smoke exposure for many years from    Substance Use Topics    Alcohol use: Yes     Comment: Very seldom.  Drug use: No       Allergies: Allergies   Allergen Reactions    Ismo [Isosorbide Mononitrate] Not Reported This Time    Penicillins Unknown (comments)    Plaquenil [Hydroxychloroquine] Other (comments)     Lost a lot of her skin. Really bad.  Prednisone Itching     Very itchy.          Review of Systems   Review of Systems   Unable to perform ROS: Mental status change       Physical Exam   Physical Exam  Vitals signs and nursing note reviewed. Constitutional:       General: She is in acute distress. Appearance: She is not ill-appearing. HENT:      Head: Normocephalic and atraumatic. Mouth/Throat:      Pharynx: No oropharyngeal exudate. Eyes:      Conjunctiva/sclera: Conjunctivae normal.      Pupils: Pupils are equal, round, and reactive to light. Neck:      Musculoskeletal: Normal range of motion. Cardiovascular:      Rate and Rhythm: Tachycardia present. Rhythm irregular. Heart sounds: No murmur. Pulmonary:      Effort: Pulmonary effort is normal. No respiratory distress. Breath sounds: Normal breath sounds. No wheezing. Abdominal:      General: Bowel sounds are normal. There is no distension. Palpations: Abdomen is soft. Tenderness: There is no abdominal tenderness. Musculoskeletal: Normal range of motion. General: No deformity. Skin:     General: Skin is warm and dry. Findings: No rash. Neurological:      Mental Status: She is alert and oriented to person, place, and time. Coordination: Coordination normal.   Psychiatric:         Behavior: Behavior normal.         Diagnostic Study Results     Labs -     Recent Results (from the past 24 hour(s))   EKG, 12 LEAD, INITIAL    Collection Time: 12/22/20 12:12 PM   Result Value Ref Range    Ventricular Rate 124 BPM    Atrial Rate 248 BPM    QRS Duration 80 ms    Q-T Interval 288 ms    QTC Calculation (Bezet) 413 ms    Calculated P Axis -96 degrees    Calculated R Axis 26 degrees    Calculated T Axis 173 degrees    Diagnosis       Atrial flutter with 2:1 AV conduction  Low voltage QRS  Cannot rule out Anteroseptal infarct (cited on or before 22-DEC-2020)  When compared with ECG of 01-JUL-2020 16:47,  Atrial flutter has replaced Sinus rhythm  Vent.  rate has increased BY  54 BPM  Questionable change in initial forces of Anterior leads  ST now depressed in Inferior leads  T wave inversion no longer evident in Inferior leads  Nonspecific T wave abnormality now evident in Lateral leads     CBC WITH AUTOMATED DIFF    Collection Time: 12/22/20 12:22 PM   Result Value Ref Range    WBC 6.6 3.6 - 11.0 K/uL    RBC 4.51 3.80 - 5.20 M/uL    HGB 13.3 11.5 - 16.0 g/dL    HCT 41.2 35.0 - 47.0 %    MCV 91.4 80.0 - 99.0 FL    MCH 29.5 26.0 - 34.0 PG    MCHC 32.3 30.0 - 36.5 g/dL    RDW 13.3 11.5 - 14.5 %    PLATELET 459 281 - 973 K/uL    MPV 9.6 8.9 - 12.9 FL    NRBC 0.0 0  WBC    ABSOLUTE NRBC 0.00 0.00 - 0.01 K/uL    NEUTROPHILS 71 32 - 75 %    LYMPHOCYTES 18 12 - 49 %    MONOCYTES 8 5 - 13 %    EOSINOPHILS 2 0 - 7 %    BASOPHILS 1 0 - 1 %    IMMATURE GRANULOCYTES 0 0.0 - 0.5 %    ABS. NEUTROPHILS 4.7 1.8 - 8.0 K/UL    ABS. LYMPHOCYTES 1.2 0.8 - 3.5 K/UL    ABS. MONOCYTES 0.5 0.0 - 1.0 K/UL    ABS. EOSINOPHILS 0.1 0.0 - 0.4 K/UL    ABS. BASOPHILS 0.1 0.0 - 0.1 K/UL    ABS. IMM. GRANS. 0.0 0.00 - 0.04 K/UL    DF AUTOMATED     METABOLIC PANEL, COMPREHENSIVE    Collection Time: 12/22/20 12:22 PM   Result Value Ref Range    Sodium 136 136 - 145 mmol/L    Potassium 4.3 3.5 - 5.1 mmol/L    Chloride 104 97 - 108 mmol/L    CO2 30 21 - 32 mmol/L    Anion gap 2 (L) 5 - 15 mmol/L    Glucose 74 65 - 100 mg/dL    BUN 19 6 - 20 MG/DL    Creatinine 1.37 (H) 0.55 - 1.02 MG/DL    BUN/Creatinine ratio 14 12 - 20      GFR est AA 44 (L) >60 ml/min/1.73m2    GFR est non-AA 36 (L) >60 ml/min/1.73m2    Calcium 8.9 8.5 - 10.1 MG/DL    Bilirubin, total 0.4 0.2 - 1.0 MG/DL    ALT (SGPT) 18 12 - 78 U/L    AST (SGOT) 17 15 - 37 U/L    Alk. phosphatase 108 45 - 117 U/L    Protein, total 7.3 6.4 - 8.2 g/dL    Albumin 3.4 (L) 3.5 - 5.0 g/dL    Globulin 3.9 2.0 - 4.0 g/dL    A-G Ratio 0.9 (L) 1.1 - 2.2     SAMPLES BEING HELD    Collection Time: 12/22/20 12:22 PM   Result Value Ref Range    SAMPLES BEING HELD  2 RED, BLUE, SST     COMMENT        Add-on orders for these samples will be processed based on acceptable specimen integrity and analyte stability, which may vary by analyte. TROPONIN I    Collection Time: 12/22/20 12:22 PM   Result Value Ref Range    Troponin-I, Qt. <0.05 <0.05 ng/mL   TSH 3RD GENERATION    Collection Time: 12/22/20 12:22 PM   Result Value Ref Range    TSH 0.53 0.36 - 3.74 uIU/mL       Radiologic Studies -   XR CHEST PORT   Final Result   IMPRESSION: No acute cardiopulmonary disease. CT Results  (Last 48 hours)    None        CXR Results  (Last 48 hours)               12/22/20 1235  XR CHEST PORT Final result    Impression:  IMPRESSION: No acute cardiopulmonary disease. Narrative:  INDICATION: Shortness of breath. Portable AP semiupright view of the chest.       Direct comparison made to prior chest x-ray dated June 2007. Cardiomediastinal silhouette is stable. Lungs are clear bilaterally. Pleural   spaces are normal. Osseous structures are diffusely demineralized, but intact. Medical Decision Making   I am the first provider for this patient. I reviewed the vital signs, available nursing notes, past medical history, past surgical history, family history and social history. Vital Signs-Reviewed the patient's vital signs. Patient Vitals for the past 12 hrs:   Temp Pulse Resp BP SpO2   12/22/20 1615  (!) 120 22 129/69    12/22/20 1600  (!) 122 22 121/81    12/22/20 1545  (!) 120 28 (!) 133/94 96 %   12/22/20 1515  (!) 120 28 131/79 96 %   12/22/20 1415  (!) 120 24 119/73 96 %   12/22/20 1400  (!) 121 30 123/75 97 %   12/22/20 1345  (!) 115 30 107/72 97 %   12/22/20 1343  (!) 118 25 (!) 147/90    12/22/20 1326  (!) 118 22 (!) 130/99 97 %   12/22/20 1218     99 %   12/22/20 1215  (!) 124 21 (!) 129/99    12/22/20 1203 98.6 °F (37 °C) (!) 124 23 (!) 133/94 98 %       Records Reviewed: Nursing records and medical records reviewed    MDM:  Patient presents with acute dyspnea. DDx: asthma, copd, pna, pulmonary edema, acute bronchitis, ACS, ptx, pna.  Will obtain EKG, labs, CXR, provide O2 as needed for hypoxia, treat symptomatically and reassess. Will continue to monitor closely in ED. Provider Notes (Medical Decision Making):   Patient is a 26-year-old female presenting with symptoms of atrial flutter with RVR. She has no signs of STEMI or acute coronary syndrome. Her troponin is negative. She has been successfully rate controlled with both diltiazem boluses and diltiazem drips. At this time we will admit for further work-up and management. She is never had atrial flutter before. The hospitalist will consult cardiology to have the patient evaluated. ED Course:   Initial assessment performed. The patients presenting problems have been discussed, and they are in agreement with the care plan formulated and outlined with them. I have encouraged them to ask questions as they arise throughout their visit. ED Course as of Dec 22 1625   Tue Dec 22, 2020   1318 EKG:  atrial flutter with rapid ventricular response. Patient with no acute widening of the QRS and normal QTC intervals. No STEMI. No T wave inversion.   EKG interpreted by me Dr. Richa Coley.    [CC]      ED Course User Index  [CC] Shelbi Kiran MD       Medications Administered     dilTIAZem (CARDIZEM) 100 mg in dextrose 5% (MBP/ADV) 100 mL infusion     Admin Date  12/22/2020 Action  New Bag Dose  2.5 mg/hr Rate  2.5 mL/hr Route  IntraVENous Administered By  Marcio Soliman RN           Admin Date  12/22/2020 Action  Rate Change Dose  5 mg/hr Rate  5 mL/hr Route  IntraVENous Administered By  Marcio Soliman RN           Admin Date  12/22/2020 Action  Rate Change Dose  7.5 mg/hr Rate  7.5 mL/hr Route  IntraVENous Administered By  Marcio Soliman RN           Admin Date  12/22/2020 Action  New Bag Dose  10 mg/hr Rate  10 mL/hr Route  IntraVENous Administered By  Arash Sanchez RN           Admin Date  12/22/2020 Action  Rate Change Dose  15 mg/hr Rate  15 mL/hr Route  IntraVENous Administered By  Marcio Soliman RN dilTIAZem (CARDIZEM) injection 15 mg     Admin Date  12/22/2020 Action  Given Dose  15 mg Route  IntraVENous Administered By  Miguelito Tabares RN                    Critical Care:  I have spent 35 minutes of critical care time in evaluating and treating this patient. This includes time spent at bedside, time with family and decision makers, documentation, review of labs and imaging, and/or consultation with specialists. It does not include time spent on separately billed procedures. This patient presents with a critical illness or injury that acutely impairs one or more vital organ systems such that there is a high probability of imminent or life threatening deterioration in the patient's condition. This case involved decision making of high complexity to assess, manipulate, and support vital organ system failure and/or to prevent further life threatening deterioration of the patient's condition. Failure to initiate these interventions on an urgent basis would likely result in sudden, clinically significant or life threatening deterioration in the patient's condition. Abnormal findings supporting critical care: Atrial for flutter with RVR  Interventions to support critical care: Diltiazem IV  Failure to intervene may result in: Progression to cardiogenic shock and/or death        Diagnosis     Clinical Impression:   1. Atrial flutter with rapid ventricular response (HCC)        Attestations:    Dante Marie MD    Please note that this dictation was completed with [employer], the computer voice recognition software. Quite often unanticipated grammatical, syntax, homophones, and other interpretive errors are inadvertently transcribed by the computer software. Please disregard these errors. Please excuse any errors that have escaped final proofreading. Thank you.

## 2020-12-23 ENCOUNTER — APPOINTMENT (OUTPATIENT)
Dept: NON INVASIVE DIAGNOSTICS | Age: 85
DRG: 273 | End: 2020-12-23
Attending: INTERNAL MEDICINE
Payer: MEDICARE

## 2020-12-23 PROBLEM — F03.90 DEMENTIA (HCC): Status: ACTIVE | Noted: 2020-12-23

## 2020-12-23 PROBLEM — I42.9 CARDIOMYOPATHY (HCC): Status: ACTIVE | Noted: 2020-12-23

## 2020-12-23 LAB
ALBUMIN SERPL-MCNC: 2.9 G/DL (ref 3.5–5)
ALBUMIN/GLOB SERPL: 0.8 {RATIO} (ref 1.1–2.2)
ALP SERPL-CCNC: 90 U/L (ref 45–117)
ALT SERPL-CCNC: 16 U/L (ref 12–78)
ANION GAP SERPL CALC-SCNC: 5 MMOL/L (ref 5–15)
AST SERPL-CCNC: 14 U/L (ref 15–37)
ATRIAL RATE: 248 BPM
BASOPHILS # BLD: 0 K/UL (ref 0–0.1)
BASOPHILS NFR BLD: 1 % (ref 0–1)
BILIRUB SERPL-MCNC: 0.5 MG/DL (ref 0.2–1)
BNP SERPL-MCNC: 1444 PG/ML
BUN SERPL-MCNC: 17 MG/DL (ref 6–20)
BUN/CREAT SERPL: 17 (ref 12–20)
CALCIUM SERPL-MCNC: 8.8 MG/DL (ref 8.5–10.1)
CALCULATED P AXIS, ECG09: -97 DEGREES
CALCULATED R AXIS, ECG10: 28 DEGREES
CALCULATED T AXIS, ECG11: 147 DEGREES
CHLORIDE SERPL-SCNC: 107 MMOL/L (ref 97–108)
CO2 SERPL-SCNC: 26 MMOL/L (ref 21–32)
CREAT SERPL-MCNC: 1.03 MG/DL (ref 0.55–1.02)
DIAGNOSIS, 93000: NORMAL
DIFFERENTIAL METHOD BLD: NORMAL
EOSINOPHIL # BLD: 0.1 K/UL (ref 0–0.4)
EOSINOPHIL NFR BLD: 2 % (ref 0–7)
ERYTHROCYTE [DISTWIDTH] IN BLOOD BY AUTOMATED COUNT: 13.7 % (ref 11.5–14.5)
GLOBULIN SER CALC-MCNC: 3.5 G/DL (ref 2–4)
GLUCOSE SERPL-MCNC: 95 MG/DL (ref 65–100)
HCT VFR BLD AUTO: 37.6 % (ref 35–47)
HGB BLD-MCNC: 12.3 G/DL (ref 11.5–16)
IMM GRANULOCYTES # BLD AUTO: 0 K/UL (ref 0–0.04)
IMM GRANULOCYTES NFR BLD AUTO: 0 % (ref 0–0.5)
LYMPHOCYTES # BLD: 1.5 K/UL (ref 0.8–3.5)
LYMPHOCYTES NFR BLD: 25 % (ref 12–49)
MCH RBC QN AUTO: 29.8 PG (ref 26–34)
MCHC RBC AUTO-ENTMCNC: 32.7 G/DL (ref 30–36.5)
MCV RBC AUTO: 91 FL (ref 80–99)
MONOCYTES # BLD: 0.5 K/UL (ref 0–1)
MONOCYTES NFR BLD: 9 % (ref 5–13)
NEUTS SEG # BLD: 3.8 K/UL (ref 1.8–8)
NEUTS SEG NFR BLD: 63 % (ref 32–75)
NRBC # BLD: 0 K/UL (ref 0–0.01)
NRBC BLD-RTO: 0 PER 100 WBC
PLATELET # BLD AUTO: 263 K/UL (ref 150–400)
PMV BLD AUTO: 9.9 FL (ref 8.9–12.9)
POTASSIUM SERPL-SCNC: 4 MMOL/L (ref 3.5–5.1)
PROT SERPL-MCNC: 6.4 G/DL (ref 6.4–8.2)
Q-T INTERVAL, ECG07: 288 MS
QRS DURATION, ECG06: 80 MS
QTC CALCULATION (BEZET), ECG08: 413 MS
RBC # BLD AUTO: 4.13 M/UL (ref 3.8–5.2)
SODIUM SERPL-SCNC: 138 MMOL/L (ref 136–145)
TROPONIN I SERPL-MCNC: <0.05 NG/ML
VENTRICULAR RATE, ECG03: 124 BPM
VIT B12 SERPL-MCNC: 227 PG/ML (ref 193–986)
WBC # BLD AUTO: 6 K/UL (ref 3.6–11)

## 2020-12-23 PROCEDURE — 4A0234Z MEASUREMENT OF CARDIAC ELECTRICAL ACTIVITY, PERCUTANEOUS APPROACH: ICD-10-PCS | Performed by: INTERNAL MEDICINE

## 2020-12-23 PROCEDURE — 99223 1ST HOSP IP/OBS HIGH 75: CPT | Performed by: INTERNAL MEDICINE

## 2020-12-23 PROCEDURE — 99153 MOD SED SAME PHYS/QHP EA: CPT | Performed by: INTERNAL MEDICINE

## 2020-12-23 PROCEDURE — 77030018729 HC ELECTRD DEFIB PAD CARD -B: Performed by: INTERNAL MEDICINE

## 2020-12-23 PROCEDURE — 93320 DOPPLER ECHO COMPLETE: CPT | Performed by: INTERNAL MEDICINE

## 2020-12-23 PROCEDURE — 85025 COMPLETE CBC W/AUTO DIFF WBC: CPT

## 2020-12-23 PROCEDURE — 74011250637 HC RX REV CODE- 250/637: Performed by: INTERNAL MEDICINE

## 2020-12-23 PROCEDURE — 97165 OT EVAL LOW COMPLEX 30 MIN: CPT

## 2020-12-23 PROCEDURE — 93621 COMP EP EVL L PAC&REC C SINS: CPT | Performed by: INTERNAL MEDICINE

## 2020-12-23 PROCEDURE — 80053 COMPREHEN METABOLIC PANEL: CPT

## 2020-12-23 PROCEDURE — 84484 ASSAY OF TROPONIN QUANT: CPT

## 2020-12-23 PROCEDURE — 93312 ECHO TRANSESOPHAGEAL: CPT

## 2020-12-23 PROCEDURE — 74011000250 HC RX REV CODE- 250: Performed by: EMERGENCY MEDICINE

## 2020-12-23 PROCEDURE — C1730 CATH, EP, 19 OR FEW ELECT: HCPCS | Performed by: INTERNAL MEDICINE

## 2020-12-23 PROCEDURE — 77010033678 HC OXYGEN DAILY

## 2020-12-23 PROCEDURE — 02583ZZ DESTRUCTION OF CONDUCTION MECHANISM, PERCUTANEOUS APPROACH: ICD-10-PCS | Performed by: INTERNAL MEDICINE

## 2020-12-23 PROCEDURE — 93613 INTRACARDIAC EPHYS 3D MAPG: CPT | Performed by: INTERNAL MEDICINE

## 2020-12-23 PROCEDURE — 4A023FZ MEASUREMENT OF CARDIAC RHYTHM, PERCUTANEOUS APPROACH: ICD-10-PCS | Performed by: INTERNAL MEDICINE

## 2020-12-23 PROCEDURE — 93325 DOPPLER ECHO COLOR FLOW MAPG: CPT | Performed by: INTERNAL MEDICINE

## 2020-12-23 PROCEDURE — 93312 ECHO TRANSESOPHAGEAL: CPT | Performed by: INTERNAL MEDICINE

## 2020-12-23 PROCEDURE — 97161 PT EVAL LOW COMPLEX 20 MIN: CPT

## 2020-12-23 PROCEDURE — 74011000250 HC RX REV CODE- 250: Performed by: INTERNAL MEDICINE

## 2020-12-23 PROCEDURE — 36415 COLL VENOUS BLD VENIPUNCTURE: CPT

## 2020-12-23 PROCEDURE — 93613 INTRACARDIAC EPHYS 3D MAPG: CPT

## 2020-12-23 PROCEDURE — 97535 SELF CARE MNGMENT TRAINING: CPT

## 2020-12-23 PROCEDURE — 83880 ASSAY OF NATRIURETIC PEPTIDE: CPT

## 2020-12-23 PROCEDURE — 97530 THERAPEUTIC ACTIVITIES: CPT

## 2020-12-23 PROCEDURE — C1894 INTRO/SHEATH, NON-LASER: HCPCS | Performed by: INTERNAL MEDICINE

## 2020-12-23 PROCEDURE — 93653 COMPRE EP EVAL TX SVT: CPT | Performed by: INTERNAL MEDICINE

## 2020-12-23 PROCEDURE — 77030027107 HC PTCH EXT REF CARTO3 J&J -F: Performed by: INTERNAL MEDICINE

## 2020-12-23 PROCEDURE — 74011250636 HC RX REV CODE- 250/636: Performed by: INTERNAL MEDICINE

## 2020-12-23 PROCEDURE — 74011250636 HC RX REV CODE- 250/636: Performed by: EMERGENCY MEDICINE

## 2020-12-23 PROCEDURE — C1732 CATH, EP, DIAG/ABL, 3D/VECT: HCPCS | Performed by: INTERNAL MEDICINE

## 2020-12-23 PROCEDURE — 65660000000 HC RM CCU STEPDOWN

## 2020-12-23 PROCEDURE — 97116 GAIT TRAINING THERAPY: CPT

## 2020-12-23 PROCEDURE — 82607 VITAMIN B-12: CPT

## 2020-12-23 PROCEDURE — 99152 MOD SED SAME PHYS/QHP 5/>YRS: CPT | Performed by: INTERNAL MEDICINE

## 2020-12-23 PROCEDURE — 02K83ZZ MAP CONDUCTION MECHANISM, PERCUTANEOUS APPROACH: ICD-10-PCS | Performed by: INTERNAL MEDICINE

## 2020-12-23 RX ORDER — SPIRONOLACTONE 25 MG
1 TABLET ORAL DAILY
COMMUNITY

## 2020-12-23 RX ORDER — SODIUM CHLORIDE 0.9 % (FLUSH) 0.9 %
5-40 SYRINGE (ML) INJECTION EVERY 8 HOURS
Status: DISCONTINUED | OUTPATIENT
Start: 2020-12-23 | End: 2020-12-25 | Stop reason: SDUPTHER

## 2020-12-23 RX ORDER — HEPARIN SODIUM 200 [USP'U]/100ML
INJECTION, SOLUTION INTRAVENOUS
Status: COMPLETED | OUTPATIENT
Start: 2020-12-23 | End: 2020-12-23

## 2020-12-23 RX ORDER — SODIUM CHLORIDE 0.9 % (FLUSH) 0.9 %
5-40 SYRINGE (ML) INJECTION AS NEEDED
Status: DISCONTINUED | OUTPATIENT
Start: 2020-12-23 | End: 2020-12-25 | Stop reason: HOSPADM

## 2020-12-23 RX ORDER — LEVOTHYROXINE SODIUM 112 UG/1
112 TABLET ORAL
COMMUNITY

## 2020-12-23 RX ORDER — MIDAZOLAM HYDROCHLORIDE 1 MG/ML
INJECTION, SOLUTION INTRAMUSCULAR; INTRAVENOUS AS NEEDED
Status: DISCONTINUED | OUTPATIENT
Start: 2020-12-23 | End: 2020-12-23 | Stop reason: HOSPADM

## 2020-12-23 RX ORDER — ENOXAPARIN SODIUM 100 MG/ML
1 INJECTION SUBCUTANEOUS EVERY 12 HOURS
Status: DISCONTINUED | OUTPATIENT
Start: 2020-12-23 | End: 2020-12-23

## 2020-12-23 RX ORDER — FENTANYL CITRATE 50 UG/ML
INJECTION, SOLUTION INTRAMUSCULAR; INTRAVENOUS AS NEEDED
Status: DISCONTINUED | OUTPATIENT
Start: 2020-12-23 | End: 2020-12-23 | Stop reason: HOSPADM

## 2020-12-23 RX ORDER — PATIROMER 8.4 G/1
8.4 POWDER, FOR SUSPENSION ORAL DAILY
COMMUNITY

## 2020-12-23 RX ORDER — LEVOTHYROXINE SODIUM 112 UG/1
112 TABLET ORAL
Status: DISCONTINUED | OUTPATIENT
Start: 2020-12-24 | End: 2020-12-25 | Stop reason: HOSPADM

## 2020-12-23 RX ORDER — LIDOCAINE HYDROCHLORIDE 10 MG/ML
INJECTION INFILTRATION; PERINEURAL AS NEEDED
Status: DISCONTINUED | OUTPATIENT
Start: 2020-12-23 | End: 2020-12-23 | Stop reason: HOSPADM

## 2020-12-23 RX ADMIN — RIVAROXABAN 15 MG: 15 TABLET, FILM COATED ORAL at 17:56

## 2020-12-23 RX ADMIN — CARVEDILOL 3.12 MG: 3.12 TABLET, FILM COATED ORAL at 10:15

## 2020-12-23 RX ADMIN — DEXTROSE MONOHYDRATE 15 MG/HR: 50 INJECTION, SOLUTION INTRAVENOUS at 11:17

## 2020-12-23 RX ADMIN — LEVOTHYROXINE SODIUM 125 MCG: 0.12 TABLET ORAL at 10:31

## 2020-12-23 RX ADMIN — Medication 10 ML: at 21:32

## 2020-12-23 RX ADMIN — CARVEDILOL 3.12 MG: 3.12 TABLET, FILM COATED ORAL at 17:53

## 2020-12-23 RX ADMIN — Medication 10 ML: at 17:54

## 2020-12-23 RX ADMIN — Medication 10 ML: at 06:00

## 2020-12-23 RX ADMIN — DEXTROSE MONOHYDRATE 15 MG/HR: 50 INJECTION, SOLUTION INTRAVENOUS at 03:02

## 2020-12-23 RX ADMIN — ENOXAPARIN SODIUM 70 MG: 80 INJECTION SUBCUTANEOUS at 10:15

## 2020-12-23 RX ADMIN — POLYETHYLENE GLYCOL 3350 17 G: 17 POWDER, FOR SOLUTION ORAL at 10:17

## 2020-12-23 RX ADMIN — FUROSEMIDE 40 MG: 10 INJECTION, SOLUTION INTRAMUSCULAR; INTRAVENOUS at 10:16

## 2020-12-23 NOTE — ED NOTES
TRANSFER - IN REPORT:    Verbal report received from Kimberly (name) on Thejose Rand.  Report consisted of patient’s Situation, Background, Assessment and   Recommendations(SBAR).   Information from the following report(s) SBAR and ED Summary was reviewed with the receiving nurse.  Opportunity for questions and clarification was provided.

## 2020-12-23 NOTE — ED NOTES
TRANSFER - OUT REPORT:    Verbal report given to Mily Santacruz (name) on Israel Anand  being transferred to [facility] (unit) for ordered procedure       Report consisted of patients Situation, Background, Assessment and   Recommendations(SBAR). Information from the following report(s) SBAR, ED Summary, [facility] and Recent Results was reviewed with the receiving nurse. Lines:   Peripheral IV 12/22/20 (Active)   Site Assessment Clean, dry, & intact 12/22/20 2014   Phlebitis Assessment 0 12/22/20 2014   Infiltration Assessment 0 12/22/20 2014   Dressing Status Clean, dry, & intact 12/22/20 2014   Dressing Type Transparent 12/22/20 2014   Hub Color/Line Status Pink; Infusing 12/22/20 2014       Peripheral IV 12/23/20 Left Antecubital (Active)   Site Assessment Clean, dry, & intact; Clean;Dry; Intact 12/23/20 0833   Phlebitis Assessment 0 12/23/20 0833   Infiltration Assessment 0 12/23/20 0833   Dressing Status Clean, dry, & intact; Clean;Dry; Intact 12/23/20 0833   Dressing Type Tape;Transparent 12/23/20 0833   Hub Color/Line Status Green;Flushed 12/23/20 0833   Alcohol Cap Used No 12/23/20 8586        Opportunity for questions and clarification was provided.       Patient transported with:   Registered Nurse

## 2020-12-23 NOTE — ED NOTES
TRANSFER - IN REPORT:    Verbal report received from [facility] (name) on [employer]. Report consisted of patients Situation, Background, Assessment and   Recommendations(SBAR). Information from the following report(s) SBAR and ED Summary was reviewed with the receiving nurse. Opportunity for questions and clarification was provided.       0845 Upon shrift change Pt pulled out her IV, New IV has been inserted and Dilt drip has been continue, Place Wic on pt     0900 Pt is extremely agitated, Notify MD     0930 Cardiologist at bedside evaluating pt     1045 PT/OT at bedside evaluating pt     1053 Update pt son on pt condition     1115  at bedside     1159 Cardiologist at bedside     1245 OT at bedside    56 Pt daughter at bedside, Pt daughter had concerns on which MD would be performing the ablation and if she has options to chose a prefer MD Notified Deena Montgomery

## 2020-12-23 NOTE — PROGRESS NOTES
Transition of Care Plan:  - Home   - Daughter to transport at d/c  - PT/OT evaluations in place, pending recommendations, likely need for [facility]NE orders to [facility]  - Outpatient follow up: PCP, Cardiology? - 2nd IMM letter    Reason for Admission: atrial flutter with RVR, acute on chronic systolic congestive heart failure, hypothyroidism on replacement, stage 4 chronic kidney disease, etc.                     RUR Score:  9% low risk for readmission                   Plan for utilizing home health:  Pt is open to [facility], would like to use at discharge, will need YVONNE orders sent by CM    PCP: First and Last name:  Natalie Radford MD    Name of Practice:    Are you a current patient: Yes/No: Yes   Approximate date of last visit: Approx a month ago per daughter   Can you participate in a virtual visit with your PCP: No                    Current Advanced Directive/Advance Care Plan: No ACP documents on file. Pt is a DNR. Pt currently confused, unable to address AMD.                         Transition of Care Plan:   Anticipating home with home health, continued family and caregiver support. CM completed assessment via phone with pt's daughter, Oleg Agosto, [place]) [phone]. Pt was previously residing in a senior apartment, where she lives alone. Pt's daughter states that pt receives 2 meals/day delivered to her apartment from apartment complex. She also has a caregiver that comes in M-F 1-2hr/day to assist with light household chores, medication administration support, and washing/styling her hair. Pt's daughter checks on pt on the weekends, to provide any support and encourage medication compliance. Pt's daughter reports that pt is at times reluctant to take her medications. Pt's daughter has been [facility] [address] [place], [place] [address] [address] [place] [address] for pt's medicines. Pt's daughter provides transportation assistance for pt to get to appts, into community, etc. Pt's daughter will transport home at d/c.  Pt also has home health physical therapy services arranged with [facility]. Pt may benefit from [place] [facility], [facility] OT as well. PT/OT consults in place, awaiting recommendations. Pt has previous SNF hx at [facility]. Pt's daughter uncertain at this time which would be preferred, will await therapy recommendations to further discuss with pt's daughter. Pt has a seat in her shower, cane, walker. Pt uses walker to ambulate in apartment. Care management will continue to follow for transition of care planning needs. Care Management Interventions  PCP Verified by CM: Yes  Palliative Care Criteria Met (RRAT>21 & CHF Dx)?: No  Mode of Transport at Discharge:  Other (see comment)(Daughter, Margarette Garcia )  Transition of Care Consult (CM Consult): Discharge Planning  Discharge Durable Medical Equipment: No(Seat in shower, walker, cane)  Physical Therapy Consult: Yes  Occupational Therapy Consult: Yes  Speech Therapy Consult: No  Current Support Network: Lives Alone, Has Personal Caregivers, Family Lives Nearby(Pt lives alone in senior apartment; has caregiver M-F 1-2hr/day, daughter visits during weekends and runs community errands)  Confirm Follow Up Transport: Family(Daughter, Margarette Garcia )  Discharge Location  Discharge Placement: Home with home health    Searcy Galeazzi, [address] [facility] [address], [address]

## 2020-12-23 NOTE — PROGRESS NOTES
Pharmacy  Enoxaparin (Lovenox®) Monitoring      Indication: atrial flutter with RVR     Current Dose: Enoxaparin 70 (1mg/kg) mg subcutaneously every 12 hours    Creatinine Clearance (mL/min): ~ 35 ml/min    Current Weight: 73 Kg    Labs:  Recent Labs     12/23/20  0454 12/22/20  1222   CREA 1.03* 1.37*   HGB 12.3 13.3    290     Wt Readings from Last 1 Encounters:   12/22/20 73 kg (160 lb 15 oz)     Ht Readings from Last 1 Encounters:   12/22/20 170.2 cm (67.01\")       Impression/Plan:   · 81 yo, 73 kg, CrCl ~ 35 ml/min - dose appropriate       Thanks,  Sona Lynn, [facility]

## 2020-12-23 NOTE — PROGRESS NOTES
Pharmacy Automatic Direct Oral Anticoagulant Renal Dosing Protocol    Patient currently receiving Rivaroxaban 20 mg daily with an indication of afib. Start date: 12/23    Major interacting medications: None  Platelet inhibitors (dose/frequency): None    Labs:  Recent Labs     12/23/20  0454 12/22/20  1222   CREA 1.03* 1.37*   HGB 12.3 13.3    290     Wt Readings from Last 1 Encounters:   12/22/20 73 kg (160 lb 15 oz)        Creatinine Clearance: 41.8 (Actual Body Weight)    Impression/Plan: Will change to 15 mg every 24 hours for CrCl 15-50 mL/min per renal dosing protocol. Pharmacy will follow daily and adjust as appropriate. Thank you,  Azael Lazaro, ZACHARYD      Child Livingston Score calculator  [facility].      ApixabanDabigatrayo  Edoxaban  Rivaroxaban    [email or web address]/[facility]/Pharmacy/Clinical%20Companion/DOAC%20Dosing_Additional%20Guidance. pdf

## 2020-12-23 NOTE — PROGRESS NOTES
Spiritual Care Assessment/Progress Note  [place] [facility]      NAME: Loyd Shi      MRN: [id number]  AGE: 80 y.o.  SEX: female  Christianity Affiliation: Roman Catholic   Language: English     12/23/2020     Total Time (in minutes): 1613     Spiritual Assessment begun in [facility] EMERGENCY DEPT through conversation with:         [x]Patient        [] Family    [] Friend(s)        Reason for Consult: Emergency Department visit     Spiritual beliefs: (Please include comment if needed)     [] Identifies with a freddy tradition:         [] Supported by a freddy community:            [] Claims no spiritual orientation:           [] Seeking spiritual identity:                [] Adheres to an individual form of spirituality:           [x] Not able to assess:  Did not indicate                          Identified resources for coping:      [x] Prayer                               [] Music                  [] Guided Imagery     [x] Family/friends                 [] Pet visits     [] Devotional reading                         [] Unknown     [] Other:                                           Interventions offered during this visit: (See comments for more details)    Patient Interventions: Affirmation of emotions/emotional suffering, Affirmation of freddy, Iconic (affirming the presence of God/Higher Power), Initial/Spiritual assessment, patient floor, Prayer (assurance of)           Plan of Care:     [] Support spiritual and/or cultural needs    [] Support AMD and/or advance care planning process      [] Support grieving process   [] Coordinate Rites and/or Rituals    [] Coordination with community clergy   [x] No spiritual needs identified at this time   [] Detailed Plan of Care below (See Comments)  [] Make referral to Music Therapy  [] Make referral to Pet Therapy     [] Make referral to Addiction services  [] Make referral to [facility]  [] Make referral to Spiritual Care Partner  [] No future visits requested [] Follow up upon further referrals     Comments:  Visited with patient in ER due to her length of stay. No family/friends present. Patient was very pleasant as she engaged in life review. She appeared mildly confused as she repeated the same stories multiple times. She did not seem able to articulate why she came to the hospital.  She did mention having a daughter and a son. She expressed no spiritual needs or concerns at this time.        TIKA Schneider, [address], Staff [address] [place] [address] Avenue    [address] [place] [address] Road Paging Service  [phone])

## 2020-12-23 NOTE — PROGRESS NOTES
Bedside and Verbal shift change report given to [facility] (oncoming nurse) by MIMI Esposito RN (offgoing nurse). Report given with SBAR, Kardex, Intake/Output, MAR and Recent Results.

## 2020-12-23 NOTE — PROGRESS NOTES
Pharmacy Medication Reconciliation     -Clarified PTA med list with daughter, Walter Armando. PTA medication list was corrected to the following:     Prior to Admission Medications   Prescriptions Last Dose Informant Taking? Calcium-Cholecalciferol, D3, (Calcium 600 with Vitamin D3) 600 mg(1,500mg) -400 unit chew  Child Yes   Sig: Take 1 Tab by mouth daily. levothyroxine (SYNTHROID) 112 mcg tablet  Child Yes   Sig: Take 112 mcg by mouth Daily (before breakfast). patiromer calcium sorbitex (Veltassa) 8.4 gram powder  Child Yes   Sig: Take 8.4 g by mouth daily.       Facility-Administered Medications: None        Thank you,  Letty Morris, PHARMD

## 2020-12-23 NOTE — ED NOTES
Bedside shift change report given to Kimberly RN (oncoming nurse) by Barbie RN (offgoing nurse). Report included the following information SBAR, ED Summary, MAR and Recent Results.     0325 - Pt resting comfortably on stretcher w/ eyes closed, monitor x 3, call bell within reach    0500 - Tech assisted pt to bedside commode.    0510 - Pt walked out of the room, into the kim, gown removed. Pt dressed and assisted back to stretcher, monitor x 3.    0800 - Entered room to find patient had removed her gown, disconnected herself from the monitor and removed her IV. Pt stated she had no clothes here w/ her and needed her chapstick & hearing aids from her purse. I gave the pt's purse to her, she retrieved her chapstick, used it & put it back. She then placed her hearing aids in her ears. I put the gown back on her & hooked her back up to the monitor. Requested tech assistance w/ IV re-insertion.

## 2020-12-23 NOTE — PROGRESS NOTES
Problem: Mobility Impaired (Adult and Pediatric)  Goal: *Acute Goals and Plan of Care (Insert Text)  Description: FUNCTIONAL STATUS PRIOR TO ADMISSION: Patient is ambulatory with household distances with walker. HOME SUPPORT PRIOR TO ADMISSION: Patient lives in senior apartment (indep living) and has an aide that come 5 days per week for 1-2 hours. Patient receives two meals per day. Daughter visits on the weekend to assist.      Physical Therapy Goals  Initiated 12/23/2020  1. Patient will move from supine to sit and sit to supine  in bed with independence within 7 day(s). 2.  Patient will transfer from bed to chair and chair to bed with supervision/set-up using the least restrictive device within 7 day(s). 3.  Patient will perform sit to stand with supervision/set-up within 7 day(s). 4.  Patient will ambulate with minimal assistance/contact guard assist for 150 feet with the least restrictive device within 7 day(s). Outcome: Not Met   PHYSICAL THERAPY EVALUATION  Patient: Cala Rinne (18 y.o. female)  Date: 12/23/2020  Primary Diagnosis: Atrial flutter with rapid ventricular response ([address] [I48.92]        Precautions:        ASSESSMENT  Based on the objective data described below, the patient presents with decreased tolerance of activity, confusion and mild agitation, general weakness, and impaired ability to transfer, ambulate and perform bed mobility. Patient seen in the ED, received on gurney and agreeable to participate, requests using bedside commode. Patient required min assist to come to sit on EOB, sitting balance is intact. Patient came to stand with min assist x 2 (hand held)  and was able to take a few slow steps to [facility]. Patient able to perform hygiene with single UE support to stabilize, HR increased to 125 so returned to supine. Patient left with call bell in reach and rails up, expect she will be able to discharge home with HHPT and may need increased supervision. Current Level of Function Impacting Discharge (mobility/balance): min assist to [facility]    Functional Outcome Measure: The patient scored 50/100 on the Barthel  outcome measure which is indicative of moderate functional impairment     Other factors to consider for discharge: falls risk, confusion     Patient will benefit from skilled therapy intervention to address the above noted impairments. PLAN :  Recommendations and Planned Interventions: bed mobility training, transfer training, gait training, therapeutic exercises, patient and family training/education and therapeutic activities      Frequency/Duration: Patient will be followed by physical therapy:  4 times a week to address goals. Recommendation for discharge: (in order for the patient to meet his/her long term goals)  Physical therapy at least 2 days/week in the home AND ensure assist and/or supervision for safety with mobility    This discharge recommendation:  A follow-up discussion with the attending provider and/or case management is planned    IF patient discharges home will need the following DME: patient owns DME required for discharge         SUBJECTIVE:   Patient stated I need to get my [place] presents and get out of this room. Johanny Daniels    OBJECTIVE DATA SUMMARY:   HISTORY:    Past Medical History:   Diagnosis Date    Arthritis     Broken leg     Right leg    Cardiomyopathy ([facility] 12/23/2020    Dementia ([facility] 12/23/2020    Dyspnea     Enlarged heart     Fracture     Hypercholesterolemia     Hyperglycemia     Hypothyroidism     Kyphoscoliosis     Malignant neoplasm (HCC)     of kidney, except pelvis    Obstructive sleep apnea     Osteoarthritis     Pain, abdominal     RA (rheumatoid arthritis) (HCC)     Renal cell carcinoma (HCC)      Past Surgical History:   Procedure Laterality Date    ENDOSCOPY, COLON, DIAGNOSTIC      HX BREAST LUMPECTOMY      x 4, No cancer found    HX CARPAL TUNNEL RELEASE      Left hand    HX CATARACT REMOVAL      both eyes    HX NEPHRECTOMY  2005    kidney cancer    HX PREMALIG/BENIGN SKIN LESION EXCISION      precancerous growth removed from back; 2 masses removed from hand and arm    HX THYROIDECTOMY      HX THYROIDECTOMY      XR ARTHRO SHOULDER LT       both shoulders       Personal factors and/or comorbidities impacting plan of care: falls risk, confusion         EXAMINATION/PRESENTATION/DECISION MAKING:   Critical Behavior:  Neurologic State: Alert  Orientation Level: Disoriented to place, Disoriented to situation, Disoriented to time, Oriented to person  Cognition: Follows commands     Hearing: Auditory  Auditory Impairment: Hard of hearing, bilateral  Range Of Motion:  AROM: Within functional limits                       Strength:    Strength: Generally decreased, functional                    Tone & Sensation:   Tone: Normal                              Coordination:  Coordination: Generally decreased, functional  Vision:      Functional Mobility:  Bed Mobility:  Rolling: Minimum assistance  Supine to Sit: Minimum assistance  Sit to Supine: Minimum assistance  Scooting: Contact guard assistance  Transfers:  Sit to Stand: Contact guard assistance;Assist x2  Stand to Sit: Contact guard assistance;Assist x2                       Balance:   Sitting: Intact  Standing: Impaired  Standing - Static: Constant support; Fair  Standing - Dynamic : Constant support; Fair  Ambulation/Gait Training:  Distance (ft): 3 Feet (ft)  Assistive Device: Gait belt  Ambulation - Level of Assistance: Minimal assistance;Assist x2        Gait Abnormalities: Decreased step clearance              Speed/Charlette: Pace decreased (<100 feet/min)                        Stairs:              Functional Measure:  Barthel Index:    Bathin  Bladder: 5  Bowels: 10  Groomin  Dressin  Feeding: 10  Mobility: 0  Stairs: 0  Toilet Use: 5  Transfer (Bed to Chair and Back): 10  Total: 50/100       The Barthel ADL Index: Guidelines  1. The index should be used as a record of what a patient does, not as a record of what a patient could do. 2. The main aim is to establish degree of independence from any help, physical or verbal, however minor and for whatever reason. 3. The need for supervision renders the patient not independent. 4. A patient's performance should be established using the best available evidence. Asking the patient, friends/relatives and nurses are the usual sources, but direct observation and common sense are also important. However direct testing is not needed. 5. Usually the patient's performance over the preceding 24-48 hours is important, but occasionally longer periods will be relevant. 6. Middle categories imply that the patient supplies over 50 per cent of the effort. 7. Use of aids to be independent is allowed. Roxann Perdomo., Barthel, D.W. (4148). Functional evaluation: the Barthel Index. [facility] (14)2. OLIMPIA Dent, Loan Cordova, Antonio Rodríguez., [place], [address] [place] [address] (1999). Measuring the change indisability after inpatient rehabilitation; comparison of the responsiveness of the Barthel Index and Functional O'Brien Measure. Journal of Neurology, Neurosurgery, and Psychiatry, 66(4), 540-638. Rajan Lainez, N.J.A, JOSE Coe, & Wanda Warner MIBAN. (2004.) Assessment of post-stroke quality of life in cost-effectiveness studies: The usefulness of the Barthel Index and the EuroQoL-5D.  Quality of Life Research, 15, 315-04          Physical Therapy Evaluation Charge Determination   History Examination Presentation Decision-Making   MEDIUM  Complexity : 1-2 comorbidities / personal factors will impact the outcome/ POC  LOW Complexity : 1-2 Standardized tests and measures addressing body structure, function, activity limitation and / or participation in recreation  LOW Complexity : Stable, uncomplicated  LOW Complexity : FOTO score of       Based on the above components, the patient evaluation is determined to be of the following complexity level: LOW     Pain Rating:      Activity Tolerance:   Fair and requires rest breaks    After treatment patient left in no apparent distress:   Supine in bed, Call bell within reach and Side rails x 3    COMMUNICATION/EDUCATION:   The patients plan of care was discussed with: Occupational therapist, Registered nurse and Physician. Patient is unable to participate in goal setting and plan of care.     Thank you for this referral.  Kelly Epperson, PT   Time Calculation: 24 mins

## 2020-12-23 NOTE — CONSULTS
[facility] [address] [facility]     Date of  Admission: 12/22/2020 11:55 AM     Admission type:Emergency    Consult for: aflutter  Consult by:hospitalist     Subjective: Donald Meyer is a 80 y.o. female admitted for Atrial flutter with rapid ventricular response ([address] [I48.92]. Admitted for evaluation of \"fast HR\". Patient lives at [facility], was having PT when noted her HR was fast.  ECG aflutter with RVR. Currently on Dilt IV 15mg with rates 100-130bpm, BP stable. Patient has hx of dementia, is pleasantly confused, has no idea where she is. Difficult to assess.     PMH:  CM EF 40-45%, LVH (2016)    Previous treatment/evaluation includes echocardiogram . Cardiac risk factors: hypertension, stress, post-menopausal.      Patient Active Problem List    Diagnosis Date Noted    Dementia ([address] 12/23/2020    Cardiomyopathy ([address] 12/23/2020    Atrial flutter with rapid ventricular response ([address] 12/22/2020    Sinus arrhythmia 10/06/2016    PAC (premature atrial contraction) 10/06/2016    Intractable back pain 10/05/2016    Abdominal pain 04/25/2012    Dysuria 04/06/2012    Personal history of renal cancer 82/48/4321      Carlyle Pratt MD  Past Medical History:   Diagnosis Date    Arthritis     Broken leg     Right leg    Cardiomyopathy ([address] 12/23/2020    Dementia ([address] 12/23/2020    Dyspnea     Enlarged heart     Fracture     Hypercholesterolemia     Hyperglycemia     Hypothyroidism     Kyphoscoliosis     Malignant neoplasm (HCC)     of kidney, except pelvis    Obstructive sleep apnea     Osteoarthritis     Pain, abdominal     RA (rheumatoid arthritis) ([address]     Renal cell carcinoma (HCC)       Social History     Socioeconomic History    Marital status:      Spouse name: Not on file    Number of children: Not on file    Years of education: Not on file    Highest education level: Not on file   Tobacco Use    Smoking status: Former Smoker     Packs/day: 0.75     Years: 9.00     Pack years: 6.75     Quit date: 1962     Years since quittin.0    Smokeless tobacco: Never Used    Tobacco comment: secondhand smoke exposure for many years from    Substance and Sexual Activity    Alcohol use: Yes     Comment: Very seldom.  Drug use: No     Allergies   Allergen Reactions    Ismo [Isosorbide Mononitrate] Not Reported This Time    Penicillins Unknown (comments)    Plaquenil [Hydroxychloroquine] Other (comments)     Lost a lot of her skin. Really bad.  Prednisone Itching     Very itchy. Family History   Problem Relation Age of Onset    Colon Cancer Mother     Diabetes Father     Pacemaker Brother     Heart Attack Brother     Diabetes Brother     Heart Disease Brother       Current Facility-Administered Medications   Medication Dose Route Frequency    enoxaparin (LOVENOX) injection 70 mg  1 mg/kg SubCUTAneous Q12H    dilTIAZem (CARDIZEM) 100 mg in dextrose 5% (MBP/ADV) 100 mL infusion  0-15 mg/hr IntraVENous TITRATE    ondansetron (ZOFRAN) injection 4 mg  4 mg IntraVENous Q4H PRN    furosemide (LASIX) injection 40 mg  40 mg IntraVENous DAILY    sodium chloride (NS) flush 5-40 mL  5-40 mL IntraVENous Q8H    sodium chloride (NS) flush 5-40 mL  5-40 mL IntraVENous PRN    acetaminophen (TYLENOL) tablet 650 mg  650 mg Oral Q6H PRN    Or    acetaminophen (TYLENOL) suppository 650 mg  650 mg Rectal Q6H PRN    polyethylene glycol (MIRALAX) packet 17 g  17 g Oral DAILY    bisacodyL (DULCOLAX) suppository 10 mg  10 mg Rectal DAILY PRN    carvediloL (COREG) tablet 3.125 mg  3.125 mg Oral BID WITH MEALS    levothyroxine (SYNTHROID) tablet 125 mcg  125 mcg Oral ACB     Current Outpatient Medications   Medication Sig    meclizine (ANTIVERT) 25 mg tablet Take 1 Tab by mouth three (3) times daily as needed for Dizziness.     lidocaine 4 % patch 1 Patch by TransDERmal route every twelve (12) hours every twelve (12) hours.    acetaminophen (TYLENOL) 325 mg tablet Take 2 Tabs by mouth every six (6) hours as needed for Pain.  calcium-vitamin D (OYSTER SHELL) 500 mg(1,250mg) -200 unit per tablet Take 1 Tab by mouth two (2) times daily (with meals).  polyethylene glycol (MIRALAX) 17 gram packet Take 1 Packet by mouth daily.  levothyroxine (SYNTHROID) 125 mcg tablet Take 1 Tab by mouth Daily (before breakfast).  carvedilol (COREG) 3.125 mg tablet Take 1 Tab by mouth two (2) times daily (with meals).         Review of Symptoms: unable to assess        Objective:      Visit Vitals  BP (!) 116/92   Pulse (!) 111   Temp 98.1 °F (36.7 °C)   Resp 26   Ht 5' 7.01\" (1.702 m)   Wt 160 lb 15 oz (73 kg)   SpO2 94%   BMI 25.20 kg/m²       Physical:   General: WNWD elderly  female in no acute distress  Heart: irr, tachy  no m/S3/JVD, no carotid bruits   Lungs: clear   Abdomen: Soft, +BS, NTND   Extremities: LE wing +DP/PT, no edema   Neurologic: Grossly normal    Data Review:   Recent Labs     12/23/20  0454 12/22/20  1222   WBC 6.0 6.6   HGB 12.3 13.3   HCT 37.6 41.2    290     Recent Labs     12/23/20  0454 12/22/20  1222    136   K 4.0 4.3    104   CO2 26 30   GLU 95 74   BUN 17 19   CREA 1.03* 1.37*   CA 8.8 8.9   ALB 2.9* 3.4*   TBILI 0.5 0.4   ALT 16 18       Recent Labs     12/23/20  0454 12/22/20  1222   TROIQ <0.05 <0.05         Intake/Output Summary (Last 24 hours) at 12/23/2020 1030  Last data filed at 12/22/2020 2014  Gross per 24 hour   Intake 88.48 ml   Output    Net 88.48 ml        Cardiographics    Telemetry: aflutter with RVR    ECG: aflutter with RVR    Echocardiogram: 2016 EF 40-45%, hypertrophy, RVH, ADAN     CXRAY: no acute process       Assessment:       Principal Problem:    Atrial flutter with rapid ventricular response ([address] (12/22/2020)    Active Problems:    Dementia ([facility] (12/23/2020)      Cardiomyopathy ([facility] (12/23/2020)         Plan:     Atrial flutter with RVR:  Previous admission Cardiology consulted for arrhythmia - at that time no fib or flutter noted  Remains uncontrolled on Dilt 15mg. PO BB for rate control, increase as BP tolerates   Consulted Dr. Geoff Wilks reference aflutter ablation - unfortunately, no family could be reached at this time. Case Management working on this  CHADS2 vasc score: 4-5, recommend long term NOAC if affordable or warfarin. Currently on Lovenox full dose. Echo pending  No further cardiology evaluation indicated at this time. Not sure of patients PTA medications. No list available and no PCP within [facility]. Patient contact: Avery Coleman [phone]    Thank you for consulting HEATHER Del Angel NP     Patient seen and examined by me with the above nurse practitioner. I personally performed all components of the history, physical, and medical decision making and agree with the assessment and plan with minor modifications as noted. IV dilt, consult Dr. Geoff Wilks.  Check echo. I will defer further cardiac care to Dr. Tiffany Easley during this admission with Dr. Vivi Bhardwaj after hospitalization. Thanks.

## 2020-12-23 NOTE — PROGRESS NOTES
Hospitalist Progress Note    NAME: Crystal Webb   :  1930   MRN:  [id number]       Assessment / Plan:    Atrial flutter with RVR POA  Acute on chronic systolic congestive heart failure LVEF 40 to 45% in 2016 POA    Found to be in atrial flutter with RVR rates to 124  on Cardizem drip, heart rates in the 80s, means of flutter  Resume p.o. Coreg  Lovenox 1 mg/kg sub-q  Cardiology consult, I spoke with [facility] earlier  Check echocardiogram  IV Lasix  Cardiology consult        Hypothyroidism on replacement POA  We will continue, current TSH 0.53     Stage 4 chronic kidney disease  Current GFR approximately 26.5  Creatinine approximately 1.3     Status post left nephrectomy for renal cell cancer     Possible early dementia  Normal TSH  Check B12 level  Patient confused    KAREN per daughter  Prior not CPAP, not using per daughter       25.0 - 29.9 Overweight / Body mass index is 25.2 kg/m². Code status: DNR  Prophylaxis: Lovenox  Recommended Disposition:  PT, OT, RN     Subjective:     Chief Complaint / Reason for Physician Visit  \"\". Discussed with RN events overnight. Confused , was seen by cardiology     Review of Systems:  Symptom Y/N Comments  Symptom Y/N Comments   Fever/Chills    Chest Pain     Poor Appetite    Edema     Cough    Abdominal Pain     Sputum    Joint Pain     SOB/KAUR    Pruritis/Rash     Nausea/vomit    Tolerating PT/OT     Diarrhea    Tolerating Diet     Constipation    Other       Could NOT obtain due to: Confused      Objective:     VITALS:   Last 24hrs VS reviewed since prior progress note.  Most recent are:  Patient Vitals for the past 24 hrs:   Temp Pulse Resp BP SpO2   20 1615  (!) 57 23 (!) 107/91 99 %   20 1603  (!) 59 14 99/77 99 %   20 1545  (!) 51 13 100/68 98 %   20 1542 97.5 °F (36.4 °C) (!) 56 16 101/64 98 %   20 1541  (!) 53 20 101/64 98 %   20 1537    102/72 95 %   20 1436  85 23  (!) 89 %   20 1435  87 26     12/23/20 1434  (!) 102 20  96 %   12/23/20 1433  100 25  96 %   12/23/20 1432  88 26  97 %   12/23/20 1430  93 22 116/68 98 %   12/23/20 1429  88 29  97 %   12/23/20 1428  (!) 102 (!) 31  97 %   12/23/20 1427  96 20  96 %   12/23/20 1426  89 22  97 %   12/23/20 1425  90 25  96 %   12/23/20 1424  93 (!) 32  95 %   12/23/20 1423  (!) 113 16  96 %   12/23/20 1422  (!) 103 20  94 %   12/23/20 1421  88 17  95 %   12/23/20 1420  (!) 118 20     12/23/20 1419  92 20     12/23/20 1418  (!) 102 (!) 31     12/23/20 1417  (!) 110 27     12/23/20 1416  (!) 109 22  95 %   12/23/20 1415  86 20  95 %   12/23/20 1414  93 19  95 %   12/23/20 1413  97 (!) 33  94 %   12/23/20 1412  96 17  95 %   12/23/20 1411  99 19  95 %   12/23/20 1410  96 25  95 %   12/23/20 1409  86 16  96 %   12/23/20 1408  (!) 103 19  97 %   12/23/20 1407  87 21  94 %   12/23/20 1406  (!) 113 22  96 %   12/23/20 1405  85 19  96 %   12/23/20 1345  95 23  93 %   12/23/20 1344  89 19  92 %   12/23/20 1343  83 21  94 %   12/23/20 1342  88 19  93 %   12/23/20 1341  87 23  94 %   12/23/20 1340  95 17  92 %   12/23/20 1339  82 21  94 %   12/23/20 1338  97 25  93 %   12/23/20 1337  91 20  94 %   12/23/20 1336  85 28  94 %   12/23/20 1335 98.5 °F (36.9 °C) 88 24 110/72 94 %   12/23/20 1334  91 19  95 %   12/23/20 1333  81 23  94 %   12/23/20 1332  90 21  94 %   12/23/20 1331  87 20  93 %   12/23/20 1330  91 19  94 %   12/23/20 1329  88 16  94 %   12/23/20 1328  78 21  94 %   12/23/20 1327  83 18  94 %   12/23/20 1326  82 20  93 %   12/23/20 1325  80 21  94 %   12/23/20 1324  85 19  94 %   12/23/20 1323  87 23  94 %   12/23/20 1322  77 22  94 %   12/23/20 1321  78 21  95 %   12/23/20 1320  84 23  94 %   12/23/20 1319  93 21  94 %   12/23/20 1318  87 21  94 %   12/23/20 1317  88 21  94 %   12/23/20 1316  81 19  94 %   12/23/20 1315  87 24  93 %   12/23/20 1314  80 20  93 %   12/23/20 1313  83 20  94 %   12/23/20 1312  82 23  94 %   12/23/20 1311  84 21  93 %   12/23/20 1310  88 22  94 %   12/23/20 1309  97 23  94 %   12/23/20 1308  89 21  94 %   12/23/20 1307  89 21  93 %   12/23/20 1306  90 23  92 %   12/23/20 1305  89 22  92 %   12/23/20 1304  81 18  93 %   12/23/20 1303  85 18  93 %   12/23/20 1302  94 23  93 %   12/23/20 1301  96 23  93 %   12/23/20 1300  91 19  93 %   12/23/20 1259  83 17  94 %   12/23/20 1258  85 21  93 %   12/23/20 1257  86 18  93 %   12/23/20 1256  94 19  93 %   12/23/20 1255  95 23  93 %   12/23/20 1254  97 24  93 %   12/23/20 1253  94 26  93 %   12/23/20 1252  (!) 101 21  93 %   12/23/20 1251  (!) 104 18  94 %   12/23/20 1250  (!) 108 25  94 %   12/23/20 1249  (!) 113 20     12/23/20 1248  (!) 109 23     12/23/20 1247  (!) 107 29     12/23/20 1246  (!) 107 25     12/23/20 1245  (!) 103 26     12/23/20 1244  (!) 109 23     12/23/20 1243  (!) 104 23     12/23/20 1242  (!) 102 (!) 31     12/23/20 1241  (!) 109 25     12/23/20 1240  (!) 108 23     12/23/20 1239  (!) 106 26     12/23/20 1238  96 25     12/23/20 1237  97 20  93 %   12/23/20 1236  92 15  93 %   12/23/20 1235  95 24  93 %   12/23/20 1234  95 20  93 %   12/23/20 1233  94 23  93 %   12/23/20 1232  98 26  92 %   12/23/20 1231  98 27  93 %   12/23/20 1230  97 25  93 %   12/23/20 1229  100 26  92 %   12/23/20 1228  91 18  92 %   12/23/20 1227  90 22  92 %   12/23/20 1226  88 21  92 %   12/23/20 1225  (!) 102 19  92 %   12/23/20 1224  92 18  92 %   12/23/20 1223  90 18  93 %   12/23/20 1222  88 22  92 %   12/23/20 1221  88 21  92 %   12/23/20 1220  90 18  93 %   12/23/20 1219  87 23  92 %   12/23/20 1218  85 22  93 %   12/23/20 1217  87 17  93 %   12/23/20 1216  92 21  93 %   12/23/20 1215  91 22  93 %   12/23/20 1214  99 19  92 % 12/23/20 1213  100 20  90 %   12/23/20 1212  98 21  92 %   12/23/20 1211  92 21  93 %   12/23/20 1210  95 17  93 %   12/23/20 1209  88 21  92 %   12/23/20 1208  96 22  92 %   12/23/20 1207  94 20  92 %   12/23/20 1206  87 21  92 %   12/23/20 1205  88 25  92 %   12/23/20 1204  97 22  92 %   12/23/20 1203  89 21  92 %   12/23/20 1202  91 25  92 %   12/23/20 1201  92 22  92 %   12/23/20 1200  90 23  92 %   12/23/20 1159  93 17  92 %   12/23/20 1158  93 25  92 %   12/23/20 1157  94 23  92 %   12/23/20 1156  95 20  91 %   12/23/20 1155  97 22  91 %   12/23/20 1154  97 22  92 %   12/23/20 1153  97 19  93 %   12/23/20 1152  93 24  93 %   12/23/20 1151  97 22  92 %   12/23/20 1150  96 20  93 %   12/23/20 1149  93 21  93 %   12/23/20 1148  94 17  92 %   12/23/20 1147  97 22  92 %   12/23/20 1146  97 22  92 %   12/23/20 1145  98 23  92 %   12/23/20 1144  100 (!) 31  93 %   12/23/20 1143  98 22  92 %   12/23/20 1142  97 19  92 %   12/23/20 1141  (!) 109 21  93 %   12/23/20 1140  (!) 105 23  92 %   12/23/20 1139  99 23  92 %   12/23/20 1138  95 22  91 %   12/23/20 1137  94 23  92 %   12/23/20 1136  98 26  92 %   12/23/20 1135  95 23  91 %   12/23/20 1134  97 19  91 %   12/23/20 1133  97 24  92 %   12/23/20 1132  (!) 101 18  (!) 89 %   12/23/20 1131  97 18  (!) 89 %   12/23/20 1130  97 23  92 %   12/23/20 1129  93 18  (!) 89 %   12/23/20 1128  (!) 103 22  91 %   12/23/20 1127  94 17  93 %   12/23/20 1126  98 20  94 %   12/23/20 1125  96 23  91 %   12/23/20 1124  (!) 103 20  92 %   12/23/20 1123  (!) 106 20  93 %   12/23/20 1122  94 22  92 %   12/23/20 1121  95 22  93 %   12/23/20 1120  (!) 102 20  93 %   12/23/20 1119  (!) 101 20  93 %   12/23/20 1118  (!) 102 24  92 %   12/23/20 1117  98 18  92 %   12/23/20 1116  (!) 106 18  90 %   12/23/20 1115  100 17  93 %   12/23/20 1114  (!) 103 22  92 %   12/23/20 1113  (!) 103 21     12/23/20 1112  (!) 108 20     12/23/20 1111  (!) 101 16     12/23/20 1110  (!) 109 24     12/23/20 1109  (!) 107 19     12/23/20 1108  (!) 110 24     12/23/20 1107  100 22     12/23/20 1106  (!) 108 21     12/23/20 1105  (!) 104 20     12/23/20 1104  (!) 106 25     12/23/20 1103  99 17     12/23/20 1102  (!) 104 19     12/23/20 1101  (!) 123 29     12/23/20 1100  (!) 124 22     12/23/20 1059  (!) 123 23     12/23/20 1058  (!) 124 20     12/23/20 1057  (!) 123 21     12/23/20 1056  (!) 123 22     12/23/20 1055  (!) 112 18     12/23/20 1054  (!) 105 16     12/23/20 1053  (!) 103 21  93 %   12/23/20 1052  (!) 105 27  93 %   12/23/20 1051  99 29  93 %   12/23/20 1050  (!) 104 18  92 %   12/23/20 1049  (!) 102 25  93 %   12/23/20 1048  (!) 103 21  93 %   12/23/20 1047  (!) 103 23  94 %   12/23/20 1046  (!) 107 19  93 %   12/23/20 1045  (!) 103 18  93 %   12/23/20 1044  (!) 103 17  93 %   12/23/20 1043  (!) 103 21  94 %   12/23/20 1042  (!) 102 17  95 %   12/23/20 1041  (!) 110 19  95 %   12/23/20 1040  (!) 103 19  94 %   12/23/20 1039  (!) 101 22  94 %   12/23/20 1038  (!) 101 17  94 %   12/23/20 1037  (!) 108 16  93 %   12/23/20 1036  (!) 108 21  94 %   12/23/20 1035  (!) 101 22  94 %   12/23/20 1034  (!) 106 20  92 %   12/23/20 1033  (!) 102 20  93 %   12/23/20 1032  (!) 106 22  94 %   12/23/20 1031  (!) 103 21  93 %   12/23/20 1030  99 19 104/69 94 %   12/23/20 1029  100 19  94 %   12/23/20 1028  99 18  94 %   12/23/20 1027  97 15  93 %   12/23/20 1026  100 16  93 %   12/23/20 1025  (!) 104 23  90 %   12/23/20 1024  (!) 101 17  94 %   12/23/20 1023  (!) 102 21  94 %   12/23/20 1022  100 24  94 %   12/23/20 1021  94 23  94 %   12/23/20 1020  (!) 101 20  94 %   12/23/20 1019  (!) 102 28  94 %   12/23/20 1018  (!) 104 24  94 %   12/23/20 1017  (!) 104 19  93 %   12/23/20 1016  (!) 103 20  95 %   12/23/20 1015  (!) 111 22  93 %   12/23/20 1014  (!) 101 21  94 %   12/23/20 1013  (!) 101 18  94 %   12/23/20 1012  (!) 101 21  93 %   12/23/20 1011  (!) 101 25  93 %   12/23/20 1010  (!) 105 19  91 %   12/23/20 1009  (!) 108 24  91 %   12/23/20 1008  98 22  94 %   12/23/20 1007  99 24  95 %   12/23/20 1006  (!) 106 23  93 %   12/23/20 1005  99 21  90 %   12/23/20 1004  100 21  94 %   12/23/20 1003  97 20  94 %   12/23/20 1002  99 20  94 %   12/23/20 1001  (!) 105 18  93 %   12/23/20 1000  (!) 105 21 118/82 93 %   12/23/20 0930  (!) 104 23 129/84 94 %   12/23/20 0834 98.1 °F (36.7 °C) 98 26 (!) 116/92 94 %   12/23/20 0800  78 20 112/64 94 %   12/23/20 0630  80 23 104/64 93 %   12/23/20 0430  74 16 104/69 93 %   12/23/20 0330  81 16 106/67 93 %   12/23/20 0300  84 16 110/61 93 %   12/22/20 2345 97.9 °F (36.6 °C) (!) 115 20 (!) 142/102 92 %   12/22/20 2330  86 25 126/73 92 %   12/22/20 2315  91 24 124/65 92 %   12/22/20 2300  84 18 119/62 93 %   12/22/20 2245  74 21 (!) 113/58 92 %   12/22/20 2230  81 22 121/67 93 %   12/22/20 2215  88 23  93 %   12/22/20 2200  81 22 113/64 91 %   12/22/20 2145  80 25 (!) 107/41 90 %   12/22/20 2130  81 23 114/62 91 %   12/22/20 2115  82 18 (!) 105/56 91 %   12/22/20 2100  84 23 (!) 99/58 92 %   12/22/20 2045  92 19 (!) 126/58 91 %   12/22/20 2030  87 21 (!) 115/59 93 %   12/22/20 2015  97 20 103/60 91 %   12/22/20 2014 97.7 °F (36.5 °C) 96 21 (!) 108/54 92 %   12/22/20 1854  (!) 113 19  90 %   12/22/20 1824  (!) 119 20 122/70 92 %   12/22/20 1754  (!) 107 21  92 %   12/22/20 1724  (!) 104 24 (!) 148/105 94 %   12/22/20 1714 98.9 °F (37.2 °C) (!) 112 23 (!) 122/90 92 %   12/22/20 1704  96 25 116/71 92 %   12/22/20 1654  (!) 108 25 112/63 94 %   12/22/20 1644  (!) 112 21 (!) 142/89 95 %   12/22/20 1634  (!) 120 21 130/86        Intake/Output Summary (Last 24 hours) at 12/23/2020 1628  Last data filed at 12/22/2020 2014  Gross per 24 hour   Intake 88.48 ml   Output —   Net 88.48 ml        I had a face to face encounter and independently examined this patient on 12/23/2020, as outlined below:  PHYSICAL EXAM:  General: WD, WN. Alert, cooperative, no acute distress    EENT:  EOMI. Anicteric sclerae. MMM  Resp:  CTA bilaterally, no wheezing or rales.  No accessory muscle use  CV:  IRRegular  rhythm,  No edema  GI:  Soft, Non distended, Non tender.  +Bowel sounds  Neurologic:  Alert   Psych:   Good insight. Not anxious nor agitated  Skin:  No rashes.  No jaundice    Reviewed most current lab test results and cultures  YES  Reviewed most current radiology test results   YES  Review and summation of old records today    NO  Reviewed patient's current orders and MAR    YES  PMH/ reviewed - no change compared to H&P  ________________________________________________________________________  Care Plan discussed with:    Comments   Patient     Family      RN y    Care Manager     Consultant                       y Multidiciplinary team rounds were held today with , nursing, pharmacist and clinical coordinator.  Patient's plan of care was discussed; medications were reviewed and discharge planning was addressed.     ________________________________________________________________________  Total NON critical care TIME: 35   Minutes    Total CRITICAL CARE TIME Spent:   Minutes non procedure based      Comments   >50% of visit spent in counseling and coordination of care y    ________________________________________________________________________  Vincent Mark MD     Procedures: see electronic medical records for all procedures/Xrays and details which were not copied into this note but were reviewed prior to creation of Plan.      LABS:  I reviewed today's most current labs and imaging studies.  Pertinent labs include:  Recent Labs     12/23/20  0454 12/22/20  1222   WBC 6.0 6.6   HGB 12.3 13.3   HCT 37.6  41.2    290     Recent Labs     12/23/20  0454 12/22/20  1222    136   K 4.0 4.3    104   CO2 26 30   GLU 95 74   BUN 17 19   CREA 1.03* 1.37*   CA 8.8 8.9   ALB 2.9* 3.4*   TBILI 0.5 0.4   ALT 16 18       Signed:  Dodie Castellanos MD

## 2020-12-23 NOTE — PROGRESS NOTES
Problem: Self Care Deficits Care Plan (Adult)  Goal: *Acute Goals and Plan of Care (Insert Text)  Description: FUNCTIONAL STATUS PRIOR TO ADMISSION: Patient was modified independent using a walker for functional mobility. Patient largely independent in ADLs, receives assistance for IADLs, and with decreased medication compliance. HOME SUPPORT: The patient lived alone in a senior apartment. She has 2 meals/day delivered to the apartment and also has a caregiver come in 1-2 hours each day, Mon-Fri. Caregiver assists with light household chores, medications, and washing/arranging hair. Daughter over on weekends to assist as well. Patient also receiving  PT.    Occupational Therapy Goals  Initiated 12/23/2020  1. Patient will perform grooming with modified independence within 7 day(s). 2.  Patient will perform lower body dressing with modified independence within 7 day(s). 3.  Patient will perform toilet transfers with modified independence within 7 day(s). 4.  Patient will perform all aspects of toileting with modified independence within 7 day(s). 5.  Patient will participate in upper extremity therapeutic exercise/activities with supervision/set-up for 10 minutes within 7 day(s). 6.  Patient will utilize fall prevention techniques during functional activities with minimal verbal cues within 7 day(s). Outcome: Progressing Towards Goal   OCCUPATIONAL THERAPY EVALUATION  Patient: Julio César Garza (78 y.o. female)  Date: 12/23/2020  Primary Diagnosis: Atrial flutter with rapid ventricular response ([facility] [address] [I48.92]        Precautions:       ASSESSMENT  Based on the objective data described below, the patient presents with confusion and short term memory deficits, impaired functional mobility and balance, and tachycardia. Patient received in ED stretcher, agitated and perseverating on lines and absence of her clothes/chair/etc. Patient somewhat skeptical of reorientation and with poor carryover.  Patient with good balance at EOB while attempting to don shoes - functional reach limited by height of stretcher (at lowest setting), requiring mod assist to don over heel and tie cloth laces. Patient able to transfer to [facility] with min assistance - CGA with UE support via furniture or HHA. Increased assist needed to control descent to seated. Patient performing own hygiene with set-up and min assist for clothing management, donning new brief with mod assist for threading LEs and pulling up over hips. With toileting activity, patient RR up to low 30s and HR up to 110 from mid 90s at rest. After several minutes rest in supine, note patient in a-fib with HR fluctuating between 88 and 120 (RN notified and aware). Pending progress in acute setting, anticipate patient would be able to discharge home with [facility] and increased supervision/assistance for safety. Current Level of Function Impacting Discharge (ADLs/self-care): up to mod A for lower body ADLs; min A for transfers    Functional Outcome Measure: The patient scored 50/100 on the Barthel Index. Other factors to consider for discharge: AMS     Patient will benefit from skilled therapy intervention to address the above noted impairments. PLAN :  Recommendations and Planned Interventions: self care training, functional mobility training, therapeutic exercise, balance training, therapeutic activities, cognitive retraining, endurance activities, patient education, and home safety training    Frequency/Duration: Patient will be followed by occupational therapy 4 times a week to address goals.     Recommendation for discharge: (in order for the patient to meet his/her long term goals)  Occupational therapy at least 2 days/week in the home AND ensure assist and/or supervision for safety with functional mobility, medication management, and IADLs    This discharge recommendation:  Has not yet been discussed the attending provider and/or case management    IF patient discharges home will need the following DME: TBD       SUBJECTIVE:   Patient stated \"Where are all my clothes? I have tons of clothes. And boxes of [place] presents.     OBJECTIVE DATA SUMMARY:   HISTORY:   Past Medical History:   Diagnosis Date    Arthritis     Broken leg     Right leg    Cardiomyopathy ([facility] [address] 12/23/2020    Dementia ([facility] [address] 12/23/2020    Dyspnea     Enlarged heart     Fracture     Hypercholesterolemia     Hyperglycemia     Hypothyroidism     Kyphoscoliosis     Malignant neoplasm (HCC)     of kidney, except pelvis    Obstructive sleep apnea     Osteoarthritis     Pain, abdominal     RA (rheumatoid arthritis) ([facility] [address]     Renal cell carcinoma (HCC)      Past Surgical History:   Procedure Laterality Date    ENDOSCOPY, COLON, DIAGNOSTIC      HX BREAST LUMPECTOMY      x 4, No cancer found    HX CARPAL TUNNEL RELEASE      Left hand    HX CATARACT REMOVAL      both eyes    HX NEPHRECTOMY  2005    kidney cancer    HX PREMALIG/BENIGN SKIN LESION EXCISION      precancerous growth removed from back; 2 masses removed from hand and arm    HX THYROIDECTOMY      HX THYROIDECTOMY      XR ARTHRO SHOULDER LT       both shoulders       Expanded or extensive additional review of patient history:          Hand dominance: Right    EXAMINATION OF PERFORMANCE DEFICITS:  Cognitive/Behavioral Status:  Neurologic State: Alert;Confused  Orientation Level: Oriented to person;Disoriented to place; Disoriented to situation  Cognition: Follows commands;Poor safety awareness  Perception: Appears intact  Perseveration: Perseverates during conversation  Safety/Judgement: Awareness of environment;Lack of insight into deficits; Fall prevention    Hearing:   Auditory  Auditory Impairment: Hard of hearing, bilateral    Vision/Perceptual:    Acuity: Within Defined Limits    Corrective Lenses: Reading glasses    Range of Motion:  AROM: Within functional limits    Strength:  Strength: Generally decreased, functional    Coordination:  Coordination: Generally decreased, functional  Fine Motor Skills-Upper: Left Intact; Right Intact    Gross Motor Skills-Upper: Left Intact; Right Intact    Tone & Sensation:  Tone: Normal    Balance:  Sitting: Intact  Standing: Impaired  Standing - Static: Constant support; Fair  Standing - Dynamic : Constant support; Fair    Functional Mobility and Transfers for ADLs:  Bed Mobility:  Rolling: Minimum assistance  Supine to Sit: Minimum assistance;Bed Modified  Sit to Supine: Minimum assistance  Scooting: Contact guard assistance    Transfers:  Sit to Stand: Contact guard assistance  Stand to Sit: Minimum assistance(for controlled descent)  Bed to Chair: Minimum assistance;Contact guard assistance  Toilet Transfer : Minimum assistance    ADL Assessment:  Feeding: Setup;Stand-by assistance    Oral Facial Hygiene/Grooming: Setup;Stand-by assistance    Upper Body Dressing: Setup;Stand-by assistance    Lower Body Dressing: Moderate assistance    Toileting: Minimum assistance    ADL Intervention and task modifications:  Grooming  Grooming Assistance: Set-up; Supervision  Position Performed: Long sitting on bed  Applying Makeup: Set-up    Lower Body Dressing Assistance  Dressing Assistance: Moderate assistance  Protective Undergarmet: Moderate assistance  Socks: Contact guard assistance  Shoes with Cloth Laces: Moderate assistance  Position Performed: Seated edge of bed  Cues: Verbal cues provided;Visual cues provided;Physical assistance    Toileting  Toileting Assistance: Minimum assistance  Bladder Hygiene: Contact guard assistance  Clothing Management: Minimum assistance  Cues: Verbal cues provided;Visual cues provided;Physical assistance for pants up  Adaptive Equipment: (BSC)    Cognitive Retraining  Safety/Judgement: Awareness of environment;Lack of insight into deficits; Fall prevention    Functional Measure:  Barthel Index:    Bathin  Bladder: 5  Bowels: 10  Groomin  Dressing: 5  Feeding: 10  Mobility: 0  Stairs: 0  Toilet Use: 5  Transfer (Bed to Chair and Back): 10  Total: 50/100        The Barthel ADL Index: Guidelines  1. The index should be used as a record of what a patient does, not as a record of what a patient could do. 2. The main aim is to establish degree of independence from any help, physical or verbal, however minor and for whatever reason. 3. The need for supervision renders the patient not independent. 4. A patient's performance should be established using the best available evidence. Asking the patient, friends/relatives and nurses are the usual sources, but direct observation and common sense are also important. However direct testing is not needed. 5. Usually the patient's performance over the preceding 24-48 hours is important, but occasionally longer periods will be relevant. 6. Middle categories imply that the patient supplies over 50 per cent of the effort. 7. Use of aids to be independent is allowed. French Coreas., Barthel, DGeoffreyW. (1539). Functional evaluation: the Barthel Index. [facility] (14)2. Adriel Hartmanr mike GIOVANNA MayesF, Eduard Alcantarama., DaMarion Levelland., [place], [address] [place] [address] (1999). Measuring the change indisability after inpatient rehabilitation; comparison of the responsiveness of the Barthel Index and Functional [place] Measure. Journal of Neurology, Neurosurgery, and Psychiatry, 66(4), 762-100. Karina Beard, [place].BREN, JOSE Coe, & Tomasa Ortiz MIBAN. (2004.) Assessment of post-stroke quality of life in cost-effectiveness studies: The usefulness of the Barthel Index and the EuroQoL-5D.  Quality of Life Research, 15, 282-94     Occupational Therapy Evaluation Charge Determination   History Examination Decision-Making   LOW Complexity : Brief history review  LOW Complexity : 1-3 performance deficits relating to physical, cognitive , or psychosocial skils that result in activity limitations and / or participation restrictions  LOW Complexity : No comorbidities that affect functional and no verbal or physical assistance needed to complete eval tasks       Based on the above components, the patient evaluation is determined to be of the following complexity level: LOW   Pain Rating:  Patient reporting mild back pain, attributing to bed. Activity Tolerance:   Fair    After treatment patient left in no apparent distress:    Supine in bed, Call bell within reach, Caregiver / family present, and in ED bed with siderails x2    COMMUNICATION/EDUCATION:   The patients plan of care was discussed with: Physical therapist and Registered nurse. Home safety education was provided and the patient/caregiver indicated understanding., Patient/family have participated as able in goal setting and plan of care. , and Patient/family agree to work toward stated goals and plan of care.     Thank you for this referral.  Zayra Quinones, OT  Time Calculation: 37 mins

## 2020-12-23 NOTE — PROGRESS NOTES
Pt sp ebony/afl abl    Will start xarelto with dinner    She is complaining of back pain - common with AFL abl for short period of time. Cont to observe. [id number] for [employer] am    .Thank you for allowing me to participate in this patients care.     John Kim MD, Kartik Peng

## 2020-12-23 NOTE — CONSULTS
Subjective:                  [address], [place], [address] [facility]  [phone]    Date of  Admission: 12/22/2020 11:55 AM     Admission type:Emergency    Crystal Webb is a 80 y.o. female admitted for Atrial flutter with rapid ventricular response ([address] [I48.92]. Brought in by caregiver for increased hr and sob. Found to be in AFL with rvr. Started on cardizem gtt yesterday but has pulled out her IV a couple of times since yesterday. She is demented and not oriented to year or place. Info obtained from review of chart and d/w nurse.       Patient Active Problem List    Diagnosis Date Noted    Atrial flutter with rapid ventricular response ([address] 12/22/2020    Sinus arrhythmia 10/06/2016    PAC (premature atrial contraction) 10/06/2016    Intractable back pain 10/05/2016    Abdominal pain 04/25/2012    Dysuria 04/06/2012    Personal history of renal cancer 67/03/7880      Eros Garcia MD  Past Medical History:   Diagnosis Date    Arthritis     Broken leg     Right leg    Dyspnea     Enlarged heart     Fracture     Hypercholesterolemia     Hyperglycemia     Hypothyroidism     Kyphoscoliosis     Malignant neoplasm (HCC)     of kidney, except pelvis    Obstructive sleep apnea     Osteoarthritis     Pain, abdominal     RA (rheumatoid arthritis) ([address]     Renal cell carcinoma (HCC)       Past Surgical History:   Procedure Laterality Date    ENDOSCOPY, COLON, DIAGNOSTIC      HX BREAST LUMPECTOMY      x 4, No cancer found    HX CARPAL TUNNEL RELEASE      Left hand    HX CATARACT REMOVAL      both eyes    HX NEPHRECTOMY  2005    kidney cancer    HX PREMALIG/BENIGN SKIN LESION EXCISION      precancerous growth removed from back; 2 masses removed from hand and arm    HX THYROIDECTOMY      HX THYROIDECTOMY      XR ARTHRO SHOULDER LT       both shoulders     Allergies   Allergen Reactions    Ismo [Isosorbide Mononitrate] Not Reported This Time    Penicillins Unknown (comments)  Plaquenil [Hydroxychloroquine] Other (comments)     Lost a lot of her skin. Really bad.  Prednisone Itching     Very itchy. Social History     Tobacco Use    Smoking status: Former Smoker     Packs/day: 0.75     Years: 9.00     Pack years: 6.75     Quit date: 1962     Years since quittin.0    Smokeless tobacco: Never Used    Tobacco comment: secondhand smoke exposure for many years from    Substance Use Topics    Alcohol use: Yes     Comment: Very seldom.  Drug use: No     Family History   Problem Relation Age of Onset    Colon Cancer Mother     Diabetes Father     Pacemaker Brother     Heart Attack Brother     Diabetes Brother     Heart Disease Brother       Current Facility-Administered Medications   Medication Dose Route Frequency    dilTIAZem (CARDIZEM) 100 mg in dextrose 5% (MBP/ADV) 100 mL infusion  0-15 mg/hr IntraVENous TITRATE    ondansetron (ZOFRAN) injection 4 mg  4 mg IntraVENous Q4H PRN    furosemide (LASIX) injection 40 mg  40 mg IntraVENous DAILY    sodium chloride (NS) flush 5-40 mL  5-40 mL IntraVENous Q8H    sodium chloride (NS) flush 5-40 mL  5-40 mL IntraVENous PRN    acetaminophen (TYLENOL) tablet 650 mg  650 mg Oral Q6H PRN    Or    acetaminophen (TYLENOL) suppository 650 mg  650 mg Rectal Q6H PRN    polyethylene glycol (MIRALAX) packet 17 g  17 g Oral DAILY    bisacodyL (DULCOLAX) suppository 10 mg  10 mg Rectal DAILY PRN    carvediloL (COREG) tablet 3.125 mg  3.125 mg Oral BID WITH MEALS    levothyroxine (SYNTHROID) tablet 125 mcg  125 mcg Oral ACB     Current Outpatient Medications   Medication Sig    meclizine (ANTIVERT) 25 mg tablet Take 1 Tab by mouth three (3) times daily as needed for Dizziness.  lidocaine 4 % patch 1 Patch by TransDERmal route every twelve (12) hours every twelve (12) hours.  acetaminophen (TYLENOL) 325 mg tablet Take 2 Tabs by mouth every six (6) hours as needed for Pain.     calcium-vitamin D (OYSTER SHELL) 500 mg(1,250mg) -200 unit per tablet Take 1 Tab by mouth two (2) times daily (with meals).  polyethylene glycol (MIRALAX) 17 gram packet Take 1 Packet by mouth daily.  levothyroxine (SYNTHROID) 125 mcg tablet Take 1 Tab by mouth Daily (before breakfast).  carvedilol (COREG) 3.125 mg tablet Take 1 Tab by mouth two (2) times daily (with meals). Review of Symptoms:  uto     Subjective:      Visit Vitals  BP (!) 116/92   Pulse 98   Temp 98.1 °F (36.7 °C)   Resp 26   Ht 5' 7.01\" (1.702 m)   Wt 160 lb 15 oz (73 kg)   SpO2 94%   BMI 25.20 kg/m²       Physical Exam  Abdomen: soft, non-tender. Extremities: extremities normal  Heart: tachy, reg  Lungs: clear to auscultation bilaterally  Pulses: 2+ and symmetric    Cardiographics    Telemetry: afl with variable block    ECG: afl with variable block    Labs:  Recent Labs     12/23/20  0454 12/22/20  1222   WBC 6.0 6.6   HGB 12.3 13.3   HCT 37.6 41.2    290     Recent Labs     12/23/20  0454 12/22/20  1222    136   K 4.0 4.3    104   CO2 26 30   GLU 95 74   BUN 17 19   CREA 1.03* 1.37*   CA 8.8 8.9   ALB 2.9* 3.4*   TBILI 0.5 0.4   ALT 16 18       Recent Labs     12/23/20  0454 12/22/20  1222   TROIQ <0.05 <0.05         Intake/Output Summary (Last 24 hours) at 12/23/2020 1003  Last data filed at 12/22/2020 2014  Gross per 24 hour   Intake 88.48 ml   Output    Net 88.48 ml         Assessment:     Assessment:       Active Problems:    Atrial flutter with rapid ventricular response ([address] (12/22/2020)    cardiomyopathy     Plan:     Alexys Kendrick is in atrial flutter with variable block. She has had issues with compliance with her iv dilt - has taken out her IV. Ideally, would benefit from MAGEN/AFL ablation and 4 weeks of oac. However, Dr Gustavo Mandel noted that the daughter is not sure how aggressive she wants to be with her medical care. I called the numbers listed on her facesheet without any response. Will cont dilt gtt and recommend oac. Asking social work for assistance with contacting family members/poa.       Michelle Reynaga MD, [facility]    12/23/2020

## 2020-12-23 NOTE — PROGRESS NOTES
Daughter at bedside.  on cardizem at max dose 15 mg. We discussed options - she is not a great candidate for more med rx - she would require lifelong oac and a couple of meds for rate control. She is in agreement that afl abl and 4 weeks of oac would be more suitable. Will use xarelto since it is a once day oac. Will proceed with ebony/afl ablation. I discussed the risks/benefits/alternatives of the procedure with the patient's daughter. Risks include (but are not limited to) bleeding, heart block, infection, cva/mi/tamponade/death. The patient understands and agrees to proceed. Thank you for allowing me to participate in this patients care.     Vu Pablo MD, Arash Estevez

## 2020-12-23 NOTE — ED NOTES
Bedside shift change report given to [address] (oncoming nurse) by Hakan Harmon RN (offgoing nurse). Report included the following information SBAR, ED Summary, MAR and Recent Results.

## 2020-12-23 NOTE — PROGRESS NOTES
D/w daughter - Conchita Rousseau    Daughter will come in this afternoon to d/w mother - med rx vs ebony/afl abl    Npo p mn    Thank you for allowing me to participate in this patients care.     Paresh Higginbotham MD, Chantelle Navarrete

## 2020-12-24 LAB
ANION GAP SERPL CALC-SCNC: 6 MMOL/L (ref 5–15)
BUN SERPL-MCNC: 26 MG/DL (ref 6–20)
BUN/CREAT SERPL: 16 (ref 12–20)
CALCIUM SERPL-MCNC: 9.2 MG/DL (ref 8.5–10.1)
CHLORIDE SERPL-SCNC: 107 MMOL/L (ref 97–108)
CO2 SERPL-SCNC: 26 MMOL/L (ref 21–32)
CREAT SERPL-MCNC: 1.64 MG/DL (ref 0.55–1.02)
GLUCOSE SERPL-MCNC: 85 MG/DL (ref 65–100)
POTASSIUM SERPL-SCNC: 4.3 MMOL/L (ref 3.5–5.1)
SODIUM SERPL-SCNC: 139 MMOL/L (ref 136–145)

## 2020-12-24 PROCEDURE — 74011250637 HC RX REV CODE- 250/637: Performed by: INTERNAL MEDICINE

## 2020-12-24 PROCEDURE — 36415 COLL VENOUS BLD VENIPUNCTURE: CPT

## 2020-12-24 PROCEDURE — 74011250636 HC RX REV CODE- 250/636: Performed by: INTERNAL MEDICINE

## 2020-12-24 PROCEDURE — 2709999900 HC NON-CHARGEABLE SUPPLY

## 2020-12-24 PROCEDURE — 99232 SBSQ HOSP IP/OBS MODERATE 35: CPT | Performed by: INTERNAL MEDICINE

## 2020-12-24 PROCEDURE — 97116 GAIT TRAINING THERAPY: CPT

## 2020-12-24 PROCEDURE — 65660000000 HC RM CCU STEPDOWN

## 2020-12-24 PROCEDURE — 80048 BASIC METABOLIC PNL TOTAL CA: CPT

## 2020-12-24 PROCEDURE — 97530 THERAPEUTIC ACTIVITIES: CPT

## 2020-12-24 RX ORDER — SODIUM CHLORIDE 450 MG/100ML
50 INJECTION, SOLUTION INTRAVENOUS CONTINUOUS
Status: DISCONTINUED | OUTPATIENT
Start: 2020-12-24 | End: 2020-12-24

## 2020-12-24 RX ADMIN — Medication 10 ML: at 22:40

## 2020-12-24 RX ADMIN — CARVEDILOL 3.12 MG: 3.12 TABLET, FILM COATED ORAL at 17:53

## 2020-12-24 RX ADMIN — POLYETHYLENE GLYCOL 3350 17 G: 17 POWDER, FOR SOLUTION ORAL at 09:00

## 2020-12-24 RX ADMIN — LEVOTHYROXINE SODIUM 112 MCG: 0.11 TABLET ORAL at 06:21

## 2020-12-24 RX ADMIN — RIVAROXABAN 15 MG: 15 TABLET, FILM COATED ORAL at 17:53

## 2020-12-24 RX ADMIN — Medication 10 ML: at 06:21

## 2020-12-24 RX ADMIN — Medication 10 ML: at 06:22

## 2020-12-24 RX ADMIN — Medication 10 ML: at 15:01

## 2020-12-24 RX ADMIN — FUROSEMIDE 40 MG: 10 INJECTION, SOLUTION INTRAMUSCULAR; INTRAVENOUS at 08:30

## 2020-12-24 RX ADMIN — CARVEDILOL 3.12 MG: 3.12 TABLET, FILM COATED ORAL at 08:28

## 2020-12-24 NOTE — PROGRESS NOTES
07:00 - Bedside report rec'd from [facility], [address] [facility].      13:12 - Message sent to attending regarding potential DC. Patient needs [facility] set up and may be closed tomorrow due to holiday. 13:45 - Spoke with CM to set up [facility] services in anticipation of DC tomorrow. 15:20 - TRANSFER - OUT REPORT:    Verbal report given to Bruno RN(name) on Bard Rico  being transferred to [facility](unit) for routine progression of care       Report consisted of patients Situation, Background, Assessment and   Recommendations(SBAR). Information from the following report(s) SBAR, Kardex, Procedure Summary, Recent Results and Cardiac Rhythm SR was reviewed with the receiving nurse. Lines:   Peripheral IV 12/22/20 Wrist (Active)   Site Assessment Clean, dry, & intact 12/24/20 1224   Phlebitis Assessment 0 12/24/20 1224   Infiltration Assessment 0 12/24/20 1224   Dressing Status Clean, dry, & intact 12/24/20 1224   Dressing Type Transparent;Dariusz 12/24/20 1224   Hub Color/Line Status Green;Capped 12/24/20 1224   Action Taken Open ports on tubing capped 12/24/20 0441   Alcohol Cap Used Yes 12/24/20 0842        Opportunity for questions and clarification was provided.       Patient transported with:   Monitor  Registered Nurse

## 2020-12-24 NOTE — PROGRESS NOTES
Hospitalist Progress Note    NAME: Domingo Babb   :  1930   MRN:  [id number]       Assessment / Plan:  Atrial flutter with RVR POA  Acute on chronic systolic congestive heart failure LVEF 40 to 45% in 2016 POA     Resume p.o. Coreg  Continue Xarelto 15 mg daily   Check echocardiogram  Cardiology consult         Hypothyroidism on replacement POA  Continue synthroid      Stage 4 chronic kidney disease  -creatinine elevated hold lasix   -creatinine Elevated        Status post left nephrectomy for renal cell cancer     Possible early dementia  Normal TSH  Patient confused      KAREN per daughter  Prior not CPAP, not using per daughter        25.0 - 29.9 Overweight / Body mass index is 25.2 kg/m².     Code status: DNR  Prophylaxis: Lovenox  Recommended Disposition:  PT, OT, RN           Subjective:     Chief Complaint / Reason for Physician Visit    Discussed with RN events overnight. More alert today , heart rate better controlled     Review of Systems:  Symptom Y/N Comments  Symptom Y/N Comments   Fever/Chills    Chest Pain     Poor Appetite    Edema     Cough    Abdominal Pain     Sputum    Joint Pain     SOB/KAUR    Pruritis/Rash     Nausea/vomit    Tolerating PT/OT     Diarrhea    Tolerating Diet     Constipation    Other       Could NOT obtain due to: dementia      Objective:     VITALS:   Last 24hrs VS reviewed since prior progress note.  Most recent are:  Patient Vitals for the past 24 hrs:   Temp Pulse Resp BP SpO2   20 1115 98 °F (36.7 °C) 66 15 125/65 97 %   20 1100  69 18 118/70    20 1000  66 20 106/62 95 %   20 0900  72 17 (!) 132/90    20 0800  66 21 128/76 96 %   20 0700 97.7 °F (36.5 °C) 66 14 117/72 97 %   20 0441 97.7 °F (36.5 °C) 68 21 136/76 96 %   20 0000 97.8 °F (36.6 °C) 71 20 132/84 93 %   20 2300 98.1 °F (36.7 °C) 74 26 135/87 93 %   20 2200  67 28 108/69 96 %   20 2100  63 22 100/61 95 %   20 2000 97.9 °F (36.6 °C) 64 27 (!) 102/57 95 %   12/23/20 1901  65 15 96/61 95 %   12/23/20 1900  65 23 (!) 80/61 96 %   12/23/20 1800  64 21 (!) 96/59    12/23/20 1700  62 20 105/63 93 %   12/23/20 1630  (!) 57 20 100/61 97 %   12/23/20 1615  (!) 57 23 (!) 107/91 99 %   12/23/20 1603  (!) 59 14 99/77 99 %   12/23/20 1600  (!) 57 21  91 %   12/23/20 1545  (!) 51 13 100/68 98 %   12/23/20 1542 97.5 °F (36.4 °C) (!) 56 16 101/64 98 %   12/23/20 1541  (!) 53 20 101/64 98 %   12/23/20 1537    102/72 95 %   12/23/20 1445  93 27 98/82 96 %   12/23/20 1436  85 23  (!) 89 %   12/23/20 1435  87 26     12/23/20 1434  (!) 102 20  96 %   12/23/20 1433  100 25  96 %   12/23/20 1432  88 26  97 %   12/23/20 1430  93 22 116/68 98 %   12/23/20 1429  88 29  97 %   12/23/20 1428  (!) 102 (!) 31  97 %   12/23/20 1427  96 20  96 %   12/23/20 1426  89 22  97 %   12/23/20 1425  90 25  96 %   12/23/20 1424  93 (!) 32  95 %   12/23/20 1423  (!) 113 16  96 %   12/23/20 1422  (!) 103 20  94 %   12/23/20 1421  88 17  95 %   12/23/20 1420  (!) 118 20     12/23/20 1419  92 20     12/23/20 1418  (!) 102 (!) 31     12/23/20 1417  (!) 110 27     12/23/20 1416  (!) 109 22  95 %   12/23/20 1415  86 20  95 %   12/23/20 1414  93 19  95 %   12/23/20 1413  97 (!) 33  94 %   12/23/20 1412  96 17  95 %   12/23/20 1411  99 19  95 %   12/23/20 1410  96 25  95 %   12/23/20 1409  86 16  96 %   12/23/20 1408  (!) 103 19  97 %   12/23/20 1407  87 21  94 %   12/23/20 1406  (!) 113 22  96 %   12/23/20 1405  85 19  96 %   12/23/20 1345  95 23  93 %   12/23/20 1344  89 19  92 %   12/23/20 1343  83 21  94 %   12/23/20 1342  88 19  93 %   12/23/20 1341  87 23  94 %   12/23/20 1340  95 17  92 %   12/23/20 1339  82 21  94 %   12/23/20 1338  97 25  93 %   12/23/20 1337  91 20  94 %   12/23/20 1336  85 28  94 %   12/23/20 1335 98.5 °F (36.9 °C) 88 24 110/72 94 %   12/23/20 1334  91 19  95 %   12/23/20 1333  81 23  94 %   12/23/20 1332  90 21  94 %   12/23/20 1331  87 20  93 %   12/23/20 1330  91 19  94 %   12/23/20 1329  88 16  94 %   12/23/20 1328  78 21  94 %   12/23/20 1327  83 18  94 %   12/23/20 1326  82 20  93 %   12/23/20 1325  80 21  94 %   12/23/20 1324  85 19  94 %   12/23/20 1323  87 23  94 %   12/23/20 1322  77 22  94 %   12/23/20 1321  78 21  95 %   12/23/20 1320  84 23  94 %   12/23/20 1319  93 21  94 %   12/23/20 1318  87 21  94 %   12/23/20 1317  88 21  94 %   12/23/20 1316  81 19  94 %   12/23/20 1315  87 24  93 %   12/23/20 1314  80 20  93 %   12/23/20 1313  83 20  94 %   12/23/20 1312  82 23  94 %   12/23/20 1311  84 21  93 %   12/23/20 1310  88 22  94 %   12/23/20 1309  97 23  94 %   12/23/20 1308  89 21  94 %   12/23/20 1307  89 21  93 %   12/23/20 1306  90 23  92 %   12/23/20 1305  89 22  92 %   12/23/20 1304  81 18  93 %   12/23/20 1303  85 18  93 %   12/23/20 1302  94 23  93 %   12/23/20 1301  96 23  93 %   12/23/20 1300  91 19  93 %   12/23/20 1259  83 17  94 %   12/23/20 1258  85 21  93 %   12/23/20 1257  86 18  93 %   12/23/20 1256  94 19  93 %   12/23/20 1255  95 23  93 %   12/23/20 1254  97 24  93 %   12/23/20 1253  94 26  93 %   12/23/20 1252  (!) 101 21  93 %   12/23/20 1251  (!) 104 18  94 %   12/23/20 1250  (!) 108 25  94 %   12/23/20 1249  (!) 113 20     12/23/20 1248  (!) 109 23     12/23/20 1247  (!) 107 29     12/23/20 1246  (!) 107 25     12/23/20 1245  (!) 103 26     12/23/20 1244  (!) 109 23     12/23/20 1243  (!) 104 23     12/23/20 1242  (!) 102 (!) 31     12/23/20 1241  (!) 109 25     12/23/20 1240  (!) 108 23     12/23/20 1239  (!) 106 26     12/23/20 1238  96 25     12/23/20 1237  97 20  93 %   12/23/20 1236  92 15  93 %   12/23/20 1235  95 24  93 %   12/23/20 1234  95 20  93 %   12/23/20 1233  94 23  93 %   12/23/20 1232  98 26  92 %   12/23/20 1231  98 27  93 %   12/23/20 1230  97 25  93 %   12/23/20 1229  100 26  92 %   12/23/20 1228  91 18  92 %   12/23/20 1227  90 22  92 %   12/23/20 1226  88 21  92 %   12/23/20 1225  (!) 102 19  92 %   12/23/20 1224  92 18  92 %   12/23/20 1223  90 18  93 %   12/23/20 1222  88 22  92 %   12/23/20 1221  88 21  92 %   12/23/20 1220  90 18  93 %   12/23/20 1219  87 23  92 %   12/23/20 1218  85 22  93 %   12/23/20 1217  87 17  93 %   12/23/20 1216  92 21  93 %   12/23/20 1215  91 22  93 %   12/23/20 1214  99 19  92 %   12/23/20 1213  100 20  90 %     No intake or output data in the 24 hours ending 12/24/20 1212     I had a face to face encounter and independently examined this patient on 12/24/2020, as outlined below:  PHYSICAL EXAM:  General: WD, WN. Alert, cooperative, no acute distress    EENT:  EOMI. Anicteric sclerae. MMM  Resp:  CTA bilaterally, no wheezing or rales. No accessory muscle use  CV:  Regular  rhythm,  No edema  GI:  Soft, Non distended, Non tender. +Bowel sounds  Neurologic:  Alert   Psych:   Good insight. Not anxious nor agitated  Skin:  No rashes. No jaundice    Reviewed most current lab test results and cultures  YES  Reviewed most current radiology test results   YES  Review and summation of old records today    NO  Reviewed patient's current orders and MAR    YES  PMH/SH reviewed - no change compared to H&P  ________________________________________________________________________  Care Plan discussed with:    Comments   Patient y    Family      RN y    Care Manager     Consultant                        Multidiciplinary team rounds were held today with , nursing, pharmacist and clinical coordinator. Patient's plan of care was discussed; medications were reviewed and discharge planning was addressed.      ________________________________________________________________________  Total NON critical care TIME:  35 Minutes    Total CRITICAL CARE TIME Spent:   Minutes non procedure based      Comments   >50% of visit spent in counseling and coordination of care y    ________________________________________________________________________  Sheri Diaz MD     Procedures: see electronic medical records for all procedures/Xrays and details which were not copied into this note but were reviewed prior to creation of Plan. LABS:  I reviewed today's most current labs and imaging studies. Pertinent labs include:  Recent Labs     12/23/20  0454 12/22/20  1222   WBC 6.0 6.6   HGB 12.3 13.3   HCT 37.6 41.2    290     Recent Labs     12/24/20  0441 12/23/20  0454 12/22/20  1222    138 136   K 4.3 4.0 4.3    107 104   CO2 26 26 30   GLU 85 95 74   BUN 26* 17 19   CREA 1.64* 1.03* 1.37*   CA 9.2 8.8 8.9   ALB  --  2.9* 3.4*   TBILI  --  0.5 0.4   ALT  --  16 18       Signed:  Sheri Diaz MD

## 2020-12-24 NOTE — PROGRESS NOTES
Problem: Mobility Impaired (Adult and Pediatric)  Goal: *Acute Goals and Plan of Care (Insert Text)  Description: FUNCTIONAL STATUS PRIOR TO ADMISSION: Patient is ambulatory with household distances with walker. HOME SUPPORT PRIOR TO ADMISSION: Patient lives in senior apartment (indep living) and has an aide that come 5 days per week for 1-2 hours. Patient receives two meals per day. Daughter visits on the weekend to assist.      Physical Therapy Goals  Initiated 12/23/2020  1. Patient will move from supine to sit and sit to supine  in bed with independence within 7 day(s). 2.  Patient will transfer from bed to chair and chair to bed with supervision/set-up using the least restrictive device within 7 day(s). 3.  Patient will perform sit to stand with supervision/set-up within 7 day(s). 4.  Patient will ambulate with minimal assistance/contact guard assist for 150 feet with the least restrictive device within 7 day(s). Outcome: Progressing Towards Goal   PHYSICAL THERAPY TREATMENT  Patient: Magen Flores (89 y.o. female)  Date: 12/24/2020  Diagnosis: Atrial flutter with rapid ventricular response (HCC) [I48.92] Atrial flutter with rapid ventricular response (HCC)  Procedure(s) (LRB):  ABLATION A-FLUTTER (N/A)  Lt Atrial Pace & Record During Ep Study (N/A)  Ep 3d Mapping (N/A) 1 Day Post-Op  Precautions:    Chart, physical therapy assessment, plan of care and goals were reviewed. ASSESSMENT  Patient continues with skilled PT services and is progressing towards goals, likely nearing baseline mod I level. Pt confused and mildly agitated, perseverating on location of her clothes, watch, and personal RW. Pt eventually agreeable to participation with therapy. Pt able to ambulate 30ft x2 w/ RW and CGAx1 (seated rest break b/t ambulation trials). Pt with increased trunk flexion in addition to shuffled gait pattern.  All balance/mobility deficits demonstrated during ambulation trial due to pt's unhappiness with utilize RW that was not her own. Given baseline cognitive deficits, pt would benefit from increased supervision/caregiver assistance at discharge. Current Level of Function Impacting Discharge (mobility/balance): SBA for sit<>stand transfer,  CGA w/ use of RW during ambulation    Other factors to consider for discharge: lives alone, dementia         PLAN :  Patient continues to benefit from skilled intervention to address the above impairments. Continue treatment per established plan of care. to address goals. Recommendation for discharge: (in order for the patient to meet his/her long term goals)  Physical therapy at least 2 days/week in the home w/ increased supervision/assistance     This discharge recommendation:  Has been made in collaboration with the attending provider and/or case management    IF patient discharges home will need the following DME: patient owns DME required for discharge       SUBJECTIVE:   Patient stated Magui Brito are the boxes of my clothes? I have clothes for medium weather, and cold weather, and hot weather, and NONE of them are here.     OBJECTIVE DATA SUMMARY:   Critical Behavior:  Neurologic State: Eyes open spontaneously, Confused  Orientation Level: Oriented to person  Cognition: Poor safety awareness, Impaired decision making, Decreased attention/concentration  Safety/Judgement: Awareness of environment, Lack of insight into deficits, Fall prevention  Functional Mobility Training:  Bed Mobility:                    Transfers:  Sit to Stand: Stand-by assistance  Stand to Sit: Stand-by assistance                             Balance:  Sitting: Intact  Standing: Impaired; With support(RW)  Standing - Static: Good;Constant support  Standing - Dynamic : Fair;Constant support  Ambulation/Gait Training:  Distance (ft): 60 Feet (ft)(30ft x2 w/ seated rest break b.t)  Assistive Device: Walker, rolling;Gait belt  Ambulation - Level of Assistance: Contact guard assistance Gait Abnormalities: Decreased step clearance;Shuffling gait(trunk flexion)              Speed/Charlette: Pace decreased (<100 feet/min); Shuffled  Step Length: Left shortened;Right shortened                    Pain Rating:  Denied complaints of pain    Activity Tolerance:   Good, VSS on RA    After treatment patient left in no apparent distress:   Sitting in chair and Call bell within reach    COMMUNICATION/COLLABORATION:   The patients plan of care was discussed with: Registered nurse.      Milissa Leyden, PT, DPT   Time Calculation: 25 mins

## 2020-12-24 NOTE — PROGRESS NOTES
Cardiology Progress Note              [address] [facility], [address], [address] [facility]  [phone]    12/24/2020 10:32 AM    Admit Date: 12/22/2020    Admit Diagnosis: Atrial flutter with rapid ventricular response ([address] [I48.92]    Subjective: Cala Rinne   denies chest pain.     Visit Vitals  /62   Pulse 66   Temp 97.7 °F (36.5 °C)   Resp 20   Ht 5' 7.01\" (1.702 m)   Wt 160 lb 15 oz (73 kg)   SpO2 95%   BMI 25.20 kg/m²     Current Facility-Administered Medications   Medication Dose Route Frequency    levothyroxine (SYNTHROID) tablet 112 mcg  112 mcg Oral ACB    sodium chloride (NS) flush 5-40 mL  5-40 mL IntraVENous Q8H    sodium chloride (NS) flush 5-40 mL  5-40 mL IntraVENous PRN    rivaroxaban (XARELTO) tablet 15 mg  15 mg Oral DAILY WITH DINNER    ondansetron (ZOFRAN) injection 4 mg  4 mg IntraVENous Q4H PRN    sodium chloride (NS) flush 5-40 mL  5-40 mL IntraVENous Q8H    sodium chloride (NS) flush 5-40 mL  5-40 mL IntraVENous PRN    acetaminophen (TYLENOL) tablet 650 mg  650 mg Oral Q6H PRN    Or    acetaminophen (TYLENOL) suppository 650 mg  650 mg Rectal Q6H PRN    polyethylene glycol (MIRALAX) packet 17 g  17 g Oral DAILY    bisacodyL (DULCOLAX) suppository 10 mg  10 mg Rectal DAILY PRN    carvediloL (COREG) tablet 3.125 mg  3.125 mg Oral BID WITH MEALS         Objective:      Visit Vitals  /62   Pulse 66   Temp 97.7 °F (36.5 °C)   Resp 20   Ht 5' 7.01\" (1.702 m)   Wt 160 lb 15 oz (73 kg)   SpO2 95%   BMI 25.20 kg/m²       Physical Exam:  Abdomen: soft, non-tender  Extremities: extremities normal  Heart: regular rate and rhythm  Lungs: clear to auscultation bilaterally  Pulses: 2+ and symmetric    Data Review:   Labs:    Recent Labs     12/23/20  0454 12/22/20  1222   WBC 6.0 6.6   HGB 12.3 13.3   HCT 37.6 41.2    290     Recent Labs     12/24/20  0441 12/23/20  0454 12/22/20  1222    138 136   K 4.3 4.0 4.3    107 104   CO2 26 26 30   GLU 85 95 74 BUN 26* 17 19   CREA 1.64* 1.03* 1.37*   CA 9.2 8.8 8.9   ALB  --  2.9* 3.4*   TBILI  --  0.5 0.4   ALT  --  16 18       Recent Labs     12/23/20  0454 12/22/20  1222   TROIQ <0.05 <0.05       No intake or output data in the 24 hours ending 12/24/20 1032     Telemetry: nsr    Assessment:     Principal Problem:    Atrial flutter with rapid ventricular response ([facility] (12/22/2020)    Active Problems:    Dementia ([facility] (12/23/2020)      Cardiomyopathy ([facility] (12/23/2020)        Plan:     Magen Flores is in sinus sp afl abl. Cont oac for 4 weeks. Will dc lasix - cr has bumped. Cont low dose coreg as bp allows. Ok for [employer] from cardiac standpoint. Please call with ?s.  F/u in 2 weeks as Madie Ramírez MD, [facility]    12/24/2020

## 2020-12-24 NOTE — PROGRESS NOTES
Referral sent to [facility] for resumption of care via [employer]. [facility] has accepted the pt. Info entered on pt AVS.  Pt possible discharge tomorrow. CM will continue to follow and assist with additional discharge needs.     Marcus Henning, MSW  Ext [phone]

## 2020-12-25 VITALS
HEART RATE: 78 BPM | WEIGHT: 171.6 LBS | DIASTOLIC BLOOD PRESSURE: 79 MMHG | TEMPERATURE: 97.6 F | SYSTOLIC BLOOD PRESSURE: 114 MMHG | RESPIRATION RATE: 18 BRPM | BODY MASS INDEX: 26.93 KG/M2 | HEIGHT: 67 IN | OXYGEN SATURATION: 95 %

## 2020-12-25 LAB
ANION GAP SERPL CALC-SCNC: 6 MMOL/L (ref 5–15)
BUN SERPL-MCNC: 29 MG/DL (ref 6–20)
BUN/CREAT SERPL: 22 (ref 12–20)
CALCIUM SERPL-MCNC: 9.3 MG/DL (ref 8.5–10.1)
CHLORIDE SERPL-SCNC: 106 MMOL/L (ref 97–108)
CO2 SERPL-SCNC: 24 MMOL/L (ref 21–32)
CREAT SERPL-MCNC: 1.3 MG/DL (ref 0.55–1.02)
ERYTHROCYTE [DISTWIDTH] IN BLOOD BY AUTOMATED COUNT: 13.6 % (ref 11.5–14.5)
GLUCOSE SERPL-MCNC: 91 MG/DL (ref 65–100)
HCT VFR BLD AUTO: 38.1 % (ref 35–47)
HGB BLD-MCNC: 12.2 G/DL (ref 11.5–16)
MCH RBC QN AUTO: 29.9 PG (ref 26–34)
MCHC RBC AUTO-ENTMCNC: 32 G/DL (ref 30–36.5)
MCV RBC AUTO: 93.4 FL (ref 80–99)
NRBC # BLD: 0 K/UL (ref 0–0.01)
NRBC BLD-RTO: 0 PER 100 WBC
PLATELET # BLD AUTO: 239 K/UL (ref 150–400)
PMV BLD AUTO: 9.9 FL (ref 8.9–12.9)
POTASSIUM SERPL-SCNC: 4.2 MMOL/L (ref 3.5–5.1)
RBC # BLD AUTO: 4.08 M/UL (ref 3.8–5.2)
SODIUM SERPL-SCNC: 136 MMOL/L (ref 136–145)
WBC # BLD AUTO: 6.9 K/UL (ref 3.6–11)

## 2020-12-25 PROCEDURE — 74011250637 HC RX REV CODE- 250/637: Performed by: INTERNAL MEDICINE

## 2020-12-25 PROCEDURE — 80048 BASIC METABOLIC PNL TOTAL CA: CPT

## 2020-12-25 PROCEDURE — 36415 COLL VENOUS BLD VENIPUNCTURE: CPT

## 2020-12-25 PROCEDURE — 85027 COMPLETE CBC AUTOMATED: CPT

## 2020-12-25 RX ORDER — CARVEDILOL 3.12 MG/1
3.12 TABLET ORAL 2 TIMES DAILY WITH MEALS
Qty: 30 TAB | Refills: 0 | Status: SHIPPED | OUTPATIENT
Start: 2020-12-25 | End: 2021-01-12 | Stop reason: ALTCHOICE

## 2020-12-25 RX ADMIN — Medication 10 ML: at 13:14

## 2020-12-25 RX ADMIN — Medication 10 ML: at 05:39

## 2020-12-25 RX ADMIN — LEVOTHYROXINE SODIUM 112 MCG: 0.11 TABLET ORAL at 05:40

## 2020-12-25 RX ADMIN — CARVEDILOL 3.12 MG: 3.12 TABLET, FILM COATED ORAL at 10:10

## 2020-12-25 RX ADMIN — Medication 10 ML: at 05:40

## 2020-12-25 RX ADMIN — RIVAROXABAN 15 MG: 15 TABLET, FILM COATED ORAL at 16:52

## 2020-12-25 RX ADMIN — PATIROMER 8.4 G: 8.4 POWDER, FOR SUSPENSION ORAL at 13:13

## 2020-12-25 RX ADMIN — POLYETHYLENE GLYCOL 3350 17 G: 17 POWDER, FOR SOLUTION ORAL at 10:11

## 2020-12-25 RX ADMIN — CARVEDILOL 3.12 MG: 3.12 TABLET, FILM COATED ORAL at 16:52

## 2020-12-25 NOTE — PROGRESS NOTES
Problem: Falls - Risk of  Goal: *Absence of Falls  Description: Document Ramiro Phillip Fall Risk and appropriate interventions in the flowsheet.   Outcome: Progressing Towards Goal  Note: Fall Risk Interventions:  Mobility Interventions: Bed/chair exit alarm, Communicate number of staff needed for ambulation/transfer, Patient to call before getting OOB    Mentation Interventions: Adequate sleep, hydration, pain control, Bed/chair exit alarm, Door open when patient unattended    Medication Interventions: Bed/chair exit alarm, Evaluate medications/consider consulting pharmacy, Patient to call before getting OOB    Elimination Interventions: Bed/chair exit alarm, Call light in reach, Patient to call for help with toileting needs

## 2020-12-25 NOTE — PROGRESS NOTES
Problem: Falls - Risk of  Goal: *Absence of Falls  Description: Document Arnold Zabrina Fall Risk and appropriate interventions in the flowsheet. Outcome: Resolved/Met     Problem: Patient Education: Go to Patient Education Activity  Goal: Patient/Family Education  Outcome: Resolved/Met     Problem: Pressure Injury - Risk of  Goal: *Prevention of pressure injury  Description: Document Eric Scale and appropriate interventions in the flowsheet.   Outcome: Resolved/Met     Problem: Patient Education: Go to Patient Education Activity  Goal: Patient/Family Education  Outcome: Resolved/Met     Problem: Patient Education: Go to Patient Education Activity  Goal: Patient/Family Education  Outcome: Resolved/Met     Problem: Patient Education: Go to Patient Education Activity  Goal: Patient/Family Education  Outcome: Resolved/Met

## 2020-12-25 NOTE — PROGRESS NOTES
0700: End of Shift Note    Bedside shift change report given to Jerry López RN (oncoming nurse) by Leah Griffin (offgoing nurse). Report included the following information SBAR, Kardex, Intake/Output, MAR, Recent Results and Cardiac Rhythm NSR    Shift worked:  Night     Shift summary and any significant changes:          Concerns for physician to address:  \     Zone phone for oncoming shift:          Activity:  Activity Level: Up with Assistance  Number times ambulated in hallways past shift: 0  Number of times OOB to chair past shift: 2    Cardiac:   Cardiac Monitoring: Yes      Cardiac Rhythm: Normal sinus rhythm    Access:   Current line(s): PIV     Genitourinary:   Urinary status: voiding    Respiratory:   O2 Device: Room air  Chronic home O2 use?: NO  Incentive spirometer at bedside: NO     GI:  Last Bowel Movement Date: 12/24/20  Current diet:  DIET ONE TIME MESSAGE  DIET CARDIAC Regular  Passing flatus: YES  Tolerating current diet: YES       Pain Management:   Patient states pain is manageable on current regimen: YES    Skin:  Eric Score: 20  Interventions: increase time out of bed and PT/OT consult    Patient Safety:  Fall Score:  Total Score: 4  Interventions: bed/chair alarm, gripper socks and pt to call before getting OOB  High Fall Risk: Yes    Length of Stay:  Expected LOS: - - -  Actual LOS: 3      [facility]

## 2020-12-25 NOTE — PROGRESS NOTES
1500 Bedside shift change report given to ROBERTO Carnes (oncoming nurse) by Gisella Thao RN (offgoing nurse). Report included the following information SBAR, Kardex, Intake/Output, MAR, Recent Results and Cardiac Rhythm NSR.     1624 Plan to administer PM coreg and xarelto and discharge pt home with daughter. Preparing pt's discharge paperwork now. [address] [facility] and discussed discharge paperwork with daughter, Vlad Royal, over the phone. IV and telemetry removed. Time was offered for questions and clarification. Pt discharged with copy of discharge instructions, all personal belongings and hard script for Xarelto. Awaiting arrival of pt's daughter to the hospital to pickup pt.     5 Daughter at front entrance of hospital. Pt discharged to front entrance via wheelchair with PCT.

## 2020-12-25 NOTE — PROGRESS NOTES
Transition of Care  · Home Health: At [address]  · PCP follow-up       Pt to discharge with [facility]. No futher discharge needs at this time. Pt is cleared from CM standpoint.     Mandy Felix, [facility],   [address] [facility]

## 2020-12-25 NOTE — DISCHARGE INSTRUCTIONS
Patient Education        Atrial Fibrillation: Care Instructions  Your Care Instructions     Atrial fibrillation is an irregular and often fast heartbeat. Treating this condition is important for several reasons. It can cause blood clots, which can travel from your heart to your brain and cause a stroke. If you have a fast heartbeat, you may feel lightheaded, dizzy, and weak. An irregular heartbeat can also increase your risk for heart failure. Atrial fibrillation is often the result of another heart condition, such as high blood pressure or coronary artery disease. Making changes to improve your heart condition will help you stay healthy and active. Follow-up care is a key part of your treatment and safety. Be sure to make and go to all appointments, and call your doctor if you are having problems. It's also a good idea to know your test results and keep a list of the medicines you take. How can you care for yourself at home? Medicines    · Take your medicines exactly as prescribed. Call your doctor if you think you are having a problem with your medicine. You will get more details on the specific medicines your doctor prescribes.     · If your doctor has given you a blood thinner to prevent a stroke, be sure you get instructions about how to take your medicine safely. Blood thinners can cause serious bleeding problems.     · Do not take any vitamins, over-the-counter drugs, or herbal products without talking to your doctor first.   Lifestyle changes    · Do not smoke. Smoking can increase your chance of a stroke and heart attack. If you need help quitting, talk to your doctor about stop-smoking programs and medicines. These can increase your chances of quitting for good.     · Eat a heart-healthy diet.     · Stay at a healthy weight. Lose weight if you need to.     · Limit alcohol to 2 drinks a day for men and 1 drink a day for women. Too much alcohol can cause health problems.     · Avoid colds and flu.  Get a pneumococcal vaccine shot. If you have had one before, ask your doctor whether you need another dose. Get a flu shot every year. If you must be around people with colds or flu, wash your hands often. Activity    · If your doctor recommends it, get more exercise. Walking is a good choice. Bit by bit, increase the amount you walk every day. Try for at least 30 minutes on most days of the week. You also may want to swim, bike, or do other activities. Your doctor may suggest that you join a cardiac rehabilitation program so that you can have help increasing your physical activity safely.     · Start light exercise if your doctor says it is okay. Even a small amount will help you get stronger, have more energy, and manage stress. Walking is an easy way to get exercise. Start out by walking a little more than you did in the hospital. Gradually increase the amount you walk.     · When you exercise, watch for signs that your heart is working too hard. You are pushing too hard if you cannot talk while you are exercising. If you become short of breath or dizzy or have chest pain, sit down and rest immediately.     · Check your pulse regularly. Place two fingers on the artery at the palm side of your wrist, in line with your thumb. If your heartbeat seems uneven or fast, talk to your doctor. When should you call for help? Call 911 anytime you think you may need emergency care. For example, call if:    · You have symptoms of a heart attack. These may include:  ? Chest pain or pressure, or a strange feeling in the chest.  ? Sweating. ? Shortness of breath. ? Nausea or vomiting. ? Pain, pressure, or a strange feeling in the back, neck, jaw, or upper belly or in one or both shoulders or arms. ? Lightheadedness or sudden weakness. ? A fast or irregular heartbeat. After you call 911, the  may tell you to chew 1 adult-strength or 2 to 4 low-dose aspirin. Wait for an ambulance.  Do not try to drive yourself.     · You have symptoms of a stroke. These may include:  ? Sudden numbness, tingling, weakness, or loss of movement in your face, arm, or leg, especially on only one side of your body. ? Sudden vision changes. ? Sudden trouble speaking. ? Sudden confusion or trouble understanding simple statements. ? Sudden problems with walking or balance. ? A sudden, severe headache that is different from past headaches.     · You passed out (lost consciousness). Call your doctor now or seek immediate medical care if:    · You have new or increased shortness of breath.     · You feel dizzy or lightheaded, or you feel like you may faint.     · Your heart rate becomes irregular.     · You can feel your heart flutter in your chest or skip heartbeats. Tell your doctor if these symptoms are new or worse. Watch closely for changes in your health, and be sure to contact your doctor if you have any problems. Where can you learn more? Go to http://www.gray.com/  Enter U020 in the search box to learn more about \"Atrial Fibrillation: Care Instructions. \"  Current as of: December 16, 2019               Content Version: 12.6  © 9484-7882 [employer]. Care instructions adapted under license by [employer] (which disclaims liability or warranty for this information). If you have questions about a medical condition or this instruction, always ask your healthcare professional. [place] [address] any warranty or liability for your use of this information.            [address] [place] [address]  [phone]        ATRIAL FLUTTER ABLATION DISCHARGE INSTRUCTIONS    Patient ID:   Trisha  [id number]  43 y.o.  4/5/1930    Admit Date: 12/22/2020    Discharge Date: 12/23/2020     Admitting Physician: Giuliana Lozano MD     Discharge Physician: Giuliana Lozano MD    Admission Diagnoses:   Atrial flutter with rapid ventricular response ([address] [I48.92]    Discharge Diagnoses:   Principal Problem:    Atrial flutter with rapid ventricular response ([facility] (12/22/2020)    Active Problems:    Dementia ([facility] (12/23/2020)      Cardiomyopathy ([facility] (12/23/2020)        Discharge Condition: Good    Cardiology Procedures this Admission:  Zbigniew/Atrial flutter ablation. Disposition: home    Reference discharge instructions provided by nursing for diet and activity. Follow-up with Dr Faina Najera or his Nurse Practitioners in 1-2 weeks. Call [phone] to make an appointment. Signed:  Mary Carbone MD  12/23/2020  3:27 PM    S/P ATRIAL FLUTTER ABLATION DISCHARGE INSTRUCTIONS    It is normal to feel tired the first couple days. Take it easy and follow the physicians instructions. CHECK THE CATHETER INSERTION SITE DAILY:  You may shower 24 hours after the procedure, remove the bandage during showering. Wash with soap and water and pat dry. Gentle cleaning of the site with soap and water is sufficient, cover with a dry clean dressing or bandage. Do not apply creams or powders to the area. Do not sit in a bathtub or pool of water for 7 days or until wound has completely healed. Temporary bruising and discomfort is normal and may last a few weeks. You may have a  formation of a small lump at the site which may last up to 6 weeks. CALL THE PHYSICIAN:  If the site becomes red, swollen or feels warm to the touch  If there is bleeding or drainage or if there is unusual pain at the groin or down the leg. If there is any bleeding, lie down, apply pressure or have someone apply pressure with a clean cloth until the bleeding stops. If the bleeding continues, call 911 to be transported to the hospital.  DO NOT DRIVE YOURSELF, [address]. Activity:      For the first 24-48 hours or as instructed by the physician:  No lifting, pushing or pulling over 5 pounds and no straining the insertion site.  Do not life grocery bags or the garbage can, do not run the vacuum  or  for 7 days.  Start with short walks as in the hospital and gradually increase as tolerated each day.  It is recommended to walk 30 minutes 5-7 days per week.  Follow your physician’s instructions on activity.  Avoid walking outside in extremes of heat or cold.  Walk inside when it is cold and windy or hot and humid.     Things to keep in mind:  No driving for at least 5 days or as designated by your physician.  Limit the number of times you go up and down the stairs  Take rests and pace yourself with activity.  Be careful and do not strain with bowel movements.    Medications:    Take all medications as prescribed  Call your physician if you have any questions  Keep an updated list of your medications with you at all times and give a list to your physician and pharmacist    Signs and Symptoms:  Be cautious of symptoms of angina or recurrent symptoms such as chest discomfort, unusual shortness of breath or fatigue, palpitations.      After Care:  Follow up with your physician as instructed.  Follow a heart healthy diet with proper portion control, daily stress management, daily exercise, blood pressure and cholesterol control , and smoking cessation.

## 2020-12-25 NOTE — PROGRESS NOTES
Cardiologist on call paged regarding coreg prescription. Per daughter, medication was to be changed to once a day. Per Dr. Alejandra Jiménez, patient may skip morning dose of coreg.

## 2020-12-25 NOTE — DISCHARGE SUMMARY
Hospitalist Discharge Summary     Patient ID:  Rene Rand  [id number]  89 y.o.  4/5/1930 12/22/2020    PCP on record: Alejandra Hunter MD    Admit date: 12/22/2020  Discharge date and time: 12/25/2020    DISCHARGE DIAGNOSIS:    Atrial flutter with RVR POA  Acute on chronic systolic congestive heart failure LVEF 40 to 45% in 2016 POA    CONSULTATIONS:  IP CONSULT TO CARDIOLOGY    Excerpted HPI from H&P of Jr Raheel Hidalgo MD:    89-year-old female     Hard of hearing, oriented currently only x 2, could provide limited history  Spoke with daughter Zulema Crystal to get additional history by phone     Patient lives in independent living facility with assistance with meals  Daughter has someone who checks on her daily  Physical therapy works with her     Daughter notes no formal diagnosis with dementia, but increasing problems with short-term memory  Also very hard of hearing     Last admission to [facility] was in 2016  Severe low back pain and L2 compression fracture  Underwent kyphoplasty  Seen by [place] cardiology for question of atrial fib  Ultimately felt the strips represented sinus tachycardia, was prescribed low-dose Coreg  Echo at that time had an LVEF of 40 to 45%     Patient currently denies complaints, she was not sure why she was brought into the emergency room  Daughter says physical therapy went to work with her today, found to have a very rapid heart rate, sent to ER  She may have had some increased shortness of breath  She currently denies headache, chest pain, shortness of breath, nausea vomiting, diarrhea, cough, fevers  No prior history of atrial fibrillation noted upon chart review     ______________________________________________________________________  DISCHARGE SUMMARY/HOSPITAL COURSE:  for full details see H&P, daily progress notes, labs, consult notes.     Atrial flutter with RVR POA  Acute on chronic systolic congestive heart failure LVEF 40 to 45% in 2016  POA     Resume p.o. Coreg  Continue Xarelto 15 mg daily   Check echocardiogram  Cardiology consult         Hypothyroidism on replacement POA  Continue synthroid      Stage 4 chronic kidney disease  -creatinine elevated hold lasix   -creatinine Elevated         Status post left nephrectomy for renal cell cancer     Possible early dementia  Normal TSH  Patient confused       KAREN per daughter  Prior not CPAP, not using per daughter        25.0 - 29.9 Overweight / Body mass index is 25.2 kg/m².     Code status: DNR  Prophylaxis: Lovenox  Recommended Disposition:  PT, OT, RN           _______________________________________________________________________  Patient seen and examined by me on discharge day. Pertinent Findings:  Gen:    Not in distress  Chest: Clear lungs  CVS:   Regular rhythm. No edema  Abd:  Soft, not distended, not tender  Neuro:  Alert,   _______________________________________________________________________  DISCHARGE MEDICATIONS:   Current Discharge Medication List      START taking these medications    Details   rivaroxaban (XARELTO) 15 mg tab tablet Take 1 Tab by mouth daily (with dinner). Qty: 30 Tab, Refills: 0         CONTINUE these medications which have CHANGED    Details   carvediloL (Coreg) 3.125 mg tablet Take 1 Tab by mouth two (2) times daily (with meals). Qty: 30 Tab, Refills: 0         CONTINUE these medications which have NOT CHANGED    Details   levothyroxine (SYNTHROID) 112 mcg tablet Take 112 mcg by mouth Daily (before breakfast). patiromer calcium sorbitex (Veltassa) 8.4 gram powder Take 8.4 g by mouth daily. Calcium-Cholecalciferol, D3, (Calcium 600 with Vitamin D3) 600 mg(1,500mg) -400 unit chew Take 1 Tab by mouth daily. Patient Follow Up Instructions: Activity: PT/OT per Home Health  Diet: Cardiac Diet  Wound Care: As directed    Follow-up with PCP  in 3 days.   Follow-up tests/labs     Follow-up Information     Follow up With Specialties Details Why Contact Info     [address] In 2 days This is the provider of your home health needs. If you do not hear from them within 24-48 hours post discharge, please give them a call. [address]    Talia Oneil MD Cardiology, [address] Vascular Surgery, Internal Medicine In 2 weeks  [address]  [phone]          ________________________________________________________________    Risk of deterioration: Moderate    Condition at Discharge:  Stable  __________________________________________________________________    Disposition  Home with family and home health services    ____________________________________________________________________    Code Status: DNR/DNI  ___________________________________________________________________      Total time in minutes spent coordinating this discharge (includes going over instructions, follow-up, prescriptions, and preparing report for sign off to her PCP) :  >30 minutes    Signed:   Dodie Castellanos MD

## 2020-12-28 ENCOUNTER — PATIENT OUTREACH (OUTPATIENT)
Dept: CASE MANAGEMENT | Age: 85
End: 2020-12-28

## 2020-12-28 NOTE — PROGRESS NOTES
Patient was admitted to [facility] on 2020 and discharged on 2020 for A flutter ablation and HF. Outreach made within 2 business days of discharge: Yes    Top Discharge Challenges to be reviewed by the provider   Additional needs identified to be addressed with provider yes  none  Discussed COVID-19 related testing which was not done at this time. Test results were not done. Patient informed of results, if available? Method of communication with provider : none       Advance Care Planning:   Does patient have an Advance Directive:  currently not on file; education provided     Inpatient Readmission Risk score:   Was this a readmission? no   Patient stated reason for the admission: Fast heart rate  Patients top risk factors for readmission: functional cognitive ability, functional physical ability, falls and medical condition  Interventions to address risk factors: Per patient daughter patient is more confused since D/C. Patient did have 1 fall a few months ago without injury. Patient to F/U with PCP and Cardiology as directed. Care Transition Nurse (CTN) contacted the family (daughter Adan Tirado) by telephone to perform post hospital discharge assessment. Verified name and  with family as identifiers. Provided introduction to self, and explanation of the CTN role. CTN reviewed discharge instructions, medical action plan and red flags with family who verbalized understanding. Family given an opportunity to ask questions and does not have any further questions or concerns at this time. The family agrees to contact the PCP office for questions related to their healthcare. Medication reconciliation was performed with family, who verbalizes understanding of administration of home medications. Referral to Pharm D needed: no     Home Health/Outpatient orders at discharge: [address]: [facility]  Date of initial visit: Called to notify [facility] that patient has been D/C on 12/25/2020    Durable Medical Equipment ordered at discharge: None   Heart Failure Note    Do you have a Scale:    No   How often do you weigh: At Dr Vanesa Lainez    Daily Weight -no scale  Zone:(Pt Reported)  green     EF: 40-45%  on 12/23/2020 (date)   Type of HF:   HFrEF     Cardiac Device present: none      Heart Failure Medications: Betablocker, Anticoagulant. Patient on Coreg, Xarelto and Veltessa.  (CKD)     Covid Risk Education    Patient has following risk factors of: heart failure and chronic kidney disease. Education provided regarding infection prevention, and signs and symptoms of COVID-19 and when to seek medical attention with family who verbalized understanding. Discussed exposure protocols and quarantine From CDC: Are you at higher risk for severe illness?  and given an opportunity for questions and concerns. The family agrees to contact the COVID-19 hotline [phone] or PCP office for questions related to COVID-19. Patient/family/caregiver given information for [facility] and agrees to enroll no    Discussed follow-up appointments. If no appointment was previously scheduled, appointment scheduling offered: yes  [address] follow up appointment(s): No future appointments. Daughter to make an appt with Cardiology  Non-[facility] follow up appointment(s): Daughter did not feel that it was needed to make an appt with PC at this time due to Tessa and getting her mother in and out of car. Encouraged to call and see if they have virtual appt and also given name and # to Anand Mcmahon. Plan for follow-up call in 7-10 days based on severity of symptoms and risk factors. CTN provided contact information for future needs. Goals      Attends follow-up appointments as directed. 12/28/20   -Patient daughter did not feel that patient needed to F/U with PCP it is hard to get her in and out of care and with Tessa is is afraid to take her out.   Explained that her PC may have virtual visits to call and check ([facility] PCP), also given name and # to contact [facility]. Monitor if patient did attend PCP appt on next call.    -Patient daughter is to call and make an appt with Cardiology, monitor that patient has an appt and attended on next call. Rasheed LARSON, RN, [facility]  Care Transition Nurse        Patient/Family verbalizes understanding of self-management of chronic disease. 12/28/20   -Spoke with patient daughter Daquan Taylor she states that patient does not have a scale and she is not real certain that her mother would remember to weigh first thing in the Am independently.      -Daughter agreeable to getting scale and weighing patient the same time and recording daily. Monitor how weights are going on next call. -Review S/S HF, patient SOB is improved per daughter and she does have ankle edema which she always has. Patient lives in [address] [place] [address] and the food options are not as selective as before [facility]. Review S/S CHF on next call along with diet. Rasheed LARSON, RN, [facility]  Care Transition Nurse        Supportive resources in place to maintain patient in the community (PT)      12/28/20   -Patient had PT prior admit and contacted [facility] to let them know about patient D/C. Monitor that [facility] PT had resumed on next call.       Rasheed LARSON, RN, [facility]  Care Transition Nurse

## 2020-12-28 NOTE — ACP (ADVANCE CARE PLANNING)
Advance Care Planning:   Does patient have an Advance Directive:  currently not on file; education provided   Daughter Nico Tipton states that patient does not have capacity and that she is POA. Patient also has 2 sons and daughter is aware since there is no Advance  Directive that all 3 would be the Health Care Decision makers.

## 2021-01-07 ENCOUNTER — PATIENT OUTREACH (OUTPATIENT)
Dept: CASE MANAGEMENT | Age: 86
End: 2021-01-07

## 2021-01-07 NOTE — PROGRESS NOTES
Goals Addressed                 This Visit's Progress     Attends follow-up appointments as directed. On track     12/28/20   -Patient daughter did not feel that patient needed to F/U with PCP it is hard to get her in and out of care and with Tessa is is afraid to take her out. Explained that her PC may have virtual visits to call and check ([facility] PCP), also given name and # to contact Critical access hospital. Monitor if patient did attend PCP appt on next call.    -Patient daughter is to call and make an appt with Cardiology, monitor that patient has an appt and attended on next call. Katie LARSON, RN, [facility]  Care Transition Nurse     01/07/21     -Patient daughter did not make an F/U appt with PCP, she did make an appt with Dr Danell Dance for 1/12/21. Also given the # to Anand Mcmahon again. Monitor that patient did attend Cardiology appt as scheduled on next call. Katie LARSON, RN, [facility]  Care Transition Nurse        Patient/Family verbalizes understanding of self-management of chronic disease. On track     12/28/20   -Spoke with patient daughter Carleen Winchester she states that patient does not have a scale and she is not real certain that her mother would remember to weigh first thing in the Am independently.      -Daughter agreeable to getting scale and weighing patient the same time and recording daily. Monitor how weights are going on next call. -Review S/S HF, patient SOB is improved per daughter and she does have ankle edema which she always has. Patient lives in [address] [place] [address] and the food options are not as selective as before [facility]. Review S/S CHF on next call along with diet. Katie LARSON, RN, [facility]  Care Transition Nurse     01/07/21   -Spoke with patient daughter Carleen Winchester, they did get a scale but are not weighing because patient cannot balance herself to weigh. Encouraged to put walker over scale and then step up and weigh, gives more support.       -Reviewed low sodium diet. Patient eats a bigger breakfast and lunch and little supper. She has 2 partial plates and sometimes food gets stuck. Still eating food from facility and pick what she will eat weekly. Daughter was selecting more low potassium foods, discussed how sodium retains fluid and that is not wanted with HF. Discussed low sodium foods. Monitor patient daily weights and how low sodium diet is going on next call. Sidney LARSON, RN, [facility]  Care Transition Nurse        Supportive resources in place to maintain patient in the community (PT)   On track     12/28/20   -Patient had PT prior admit and contacted [facility] to let them know about patient D/C. Monitor that [facility] PT had resumed on next call. Sidney Monday NEO, RN, [facility]  Care Transition Nurse     1/7/21  -Patient is receiving PT X2 per week and is doing well. Monitor PT status on next call.      Sidney Monday MSN, RN, [facility]  Care Transition Nurse

## 2021-01-12 ENCOUNTER — OFFICE VISIT (OUTPATIENT)
Dept: CARDIOLOGY CLINIC | Age: 86
End: 2021-01-12
Payer: MEDICARE

## 2021-01-12 VITALS
DIASTOLIC BLOOD PRESSURE: 86 MMHG | SYSTOLIC BLOOD PRESSURE: 132 MMHG | HEIGHT: 67 IN | WEIGHT: 179.4 LBS | HEART RATE: 66 BPM | RESPIRATION RATE: 18 BRPM | BODY MASS INDEX: 28.16 KG/M2

## 2021-01-12 DIAGNOSIS — Z86.79 HISTORY OF IRREGULAR HEARTBEAT: Primary | ICD-10-CM

## 2021-01-12 DIAGNOSIS — I48.92 ATRIAL FLUTTER WITH RAPID VENTRICULAR RESPONSE (HCC): ICD-10-CM

## 2021-01-12 DIAGNOSIS — I42.0 DILATED CARDIOMYOPATHY (HCC): ICD-10-CM

## 2021-01-12 DIAGNOSIS — I49.1 PAC (PREMATURE ATRIAL CONTRACTION): ICD-10-CM

## 2021-01-12 PROCEDURE — G8510 SCR DEP NEG, NO PLAN REQD: HCPCS | Performed by: INTERNAL MEDICINE

## 2021-01-12 PROCEDURE — 1101F PT FALLS ASSESS-DOCD LE1/YR: CPT | Performed by: INTERNAL MEDICINE

## 2021-01-12 PROCEDURE — 1090F PRES/ABSN URINE INCON ASSESS: CPT | Performed by: INTERNAL MEDICINE

## 2021-01-12 PROCEDURE — G8427 DOCREV CUR MEDS BY ELIG CLIN: HCPCS | Performed by: INTERNAL MEDICINE

## 2021-01-12 PROCEDURE — G8536 NO DOC ELDER MAL SCRN: HCPCS | Performed by: INTERNAL MEDICINE

## 2021-01-12 PROCEDURE — 93010 ELECTROCARDIOGRAM REPORT: CPT | Performed by: INTERNAL MEDICINE

## 2021-01-12 PROCEDURE — 93005 ELECTROCARDIOGRAM TRACING: CPT | Performed by: INTERNAL MEDICINE

## 2021-01-12 PROCEDURE — 1111F DSCHRG MED/CURRENT MED MERGE: CPT | Performed by: INTERNAL MEDICINE

## 2021-01-12 PROCEDURE — G8419 CALC BMI OUT NRM PARAM NOF/U: HCPCS | Performed by: INTERNAL MEDICINE

## 2021-01-12 PROCEDURE — G0463 HOSPITAL OUTPT CLINIC VISIT: HCPCS | Performed by: INTERNAL MEDICINE

## 2021-01-12 PROCEDURE — 99215 OFFICE O/P EST HI 40 MIN: CPT | Performed by: INTERNAL MEDICINE

## 2021-01-12 RX ORDER — METOPROLOL SUCCINATE 25 MG/1
12.5 TABLET, EXTENDED RELEASE ORAL DAILY
Qty: 45 TAB | Refills: 1 | Status: SHIPPED | OUTPATIENT
Start: 2021-01-12

## 2021-01-12 NOTE — LETTER
1/12/2021 Patient: Michelle Bennett YOB: 1930 Date of Visit: 1/12/2021 Jaylene Santillan MD 
[address] [place] [phone] Via Fax: [phone] Dear Jaylene Santillan MD, Thank you for referring Ms. Kia Perez to [address] [place] [address] for evaluation. My notes for this consultation are attached. If you have questions, please do not hesitate to call me. I look forward to following your patient along with you. Sincerely, Kd Pineda MD

## 2021-01-12 NOTE — PROGRESS NOTES
1. Have you been to the ER, urgent care clinic since your last visit? Hospitalized since your last visit? [facility] 12-, s/p ablation. 2. Have you seen or consulted any other health care providers outside of the [address] [place] [address] since your last visit? Include any pap smears or colon screening.  No

## 2021-01-12 NOTE — PROGRESS NOTES
ELECTROPHYSIOLOGY        Subjective: Loyd Shi is a 80 y.o. female is here for EP follow up. The patient denies chest pain/ shortness of breath, orthopnea, PND, LE edema, palpitations, syncope, presyncope or fatigue. Loyd Shi  is on [address] [place] [address] Road, reports no melena, hematuria, or obvious signs of bleeding. No falls.        Patient Active Problem List    Diagnosis Date Noted    Dementia ([address] 12/23/2020    Cardiomyopathy ([address] 12/23/2020    Atrial flutter with rapid ventricular response ([address] 12/22/2020    Sinus arrhythmia 10/06/2016    PAC (premature atrial contraction) 10/06/2016    Intractable back pain 10/05/2016    Abdominal pain 04/25/2012    Dysuria 04/06/2012    Personal history of renal cancer 91/08/3389      Pardeep Olson MD  Past Medical History:   Diagnosis Date    Arthritis     Broken leg     Right leg    Cardiomyopathy ([facility] 12/23/2020    Dementia ([facility] 12/23/2020    Dyspnea     Enlarged heart     Fracture     Hypercholesterolemia     Hyperglycemia     Hypothyroidism     Kyphoscoliosis     Malignant neoplasm (HCC)     of kidney, except pelvis    Obstructive sleep apnea     Osteoarthritis     Pain, abdominal     RA (rheumatoid arthritis) (HCC)     Renal cell carcinoma (HCC)       Past Surgical History:   Procedure Laterality Date    ENDOSCOPY, COLON, DIAGNOSTIC      HX BREAST LUMPECTOMY      x 4, No cancer found    HX CARPAL TUNNEL RELEASE      Left hand    HX CATARACT REMOVAL      both eyes    HX NEPHRECTOMY  2005    kidney cancer    HX PREMALIG/BENIGN SKIN LESION EXCISION      precancerous growth removed from back; 2 masses removed from hand and arm    HX THYROIDECTOMY      HX THYROIDECTOMY      CO COMPRE ELECTROPHYSIOL XM W/LEFT ATRIAL PACNG/REC N/A 12/23/2020    Lt Atrial Pace & Record During Ep Study performed by Aj Sol MD at [facility] CARDIAC CATH LAB    CO EPHYS EVAL W/ABLATION SUPRAVENT ARRHYTHMIA N/A 12/23/2020    ABLATION A-FLUTTER performed by Juan Hill MD at [facility] CARDIAC CATH LAB    [place] INTRACARDIAC ELECTROPHYSIOLOGIC 3D MAPPING N/A 2020    Ep 3d Mapping performed by Juan Hill MD at [facility] CARDIAC CATH LAB    XR ARTHRO SHOULDER LT       both shoulders     Allergies   Allergen Reactions    Ismo [Isosorbide Mononitrate] Not Reported This Time    Penicillins Unknown (comments)    Plaquenil [Hydroxychloroquine] Other (comments)     Lost a lot of her skin. Really bad.  Prednisone Itching     Very itchy. Family History   Problem Relation Age of Onset    Colon Cancer Mother     Diabetes Father     Pacemaker Brother     Heart Attack Brother     Diabetes Brother     Heart Disease Brother     negative for cardiac disease  Social History     Socioeconomic History    Marital status:      Spouse name: Not on file    Number of children: Not on file    Years of education: Not on file    Highest education level: Not on file   Tobacco Use    Smoking status: Former Smoker     Packs/day: 0.75     Years: 9.00     Pack years: 6.75     Quit date: 1962     Years since quittin.0    Smokeless tobacco: Never Used    Tobacco comment: secondhand smoke exposure for many years from    Substance and Sexual Activity    Alcohol use: Yes     Comment: Very seldom.  Drug use: No     Current Outpatient Medications   Medication Sig    metoprolol succinate (TOPROL-XL) 25 mg XL tablet Take 0.5 Tabs by mouth daily.  rivaroxaban (XARELTO) 15 mg tab tablet Take 1 Tab by mouth daily (with dinner).  levothyroxine (SYNTHROID) 112 mcg tablet Take 112 mcg by mouth Daily (before breakfast).  patiromer calcium sorbitex (Veltassa) 8.4 gram powder Take 8.4 g by mouth daily.  Calcium-Cholecalciferol, D3, (Calcium 600 with Vitamin D3) 600 mg(1,500mg) -400 unit chew Take 1 Tab by mouth daily. No current facility-administered medications for this visit.        Vitals:    21 1339   BP: 132/86   Pulse: 66   Resp: 18   Weight: 179 lb 6.4 oz (81.4 kg)   Height: 5' 7\" (1.702 m)       I have reviewed the nurses notes, vitals, problem list, allergy list, medical history, family, social history and medications. Review of Symptoms:    General: Pt denies excessive weight gain or loss. Pt is able to conduct ADL's  HEENT: Denies blurred vision, headaches, epistaxis and difficulty swallowing. Respiratory: Denies shortness of breath, KAUR, wheezing or stridor. Cardiovascular: Denies precordial pain, palpitations, edema or PND  Gastrointestinal: Denies poor appetite, indigestion, abdominal pain or blood in stool  Urinary: Denies dysuria, pyuria  Musculoskeletal: Denies pain or swelling from muscles or joints  Neurologic: Denies tremor, paresthesias, or sensory motor disturbance  Skin: Denies rash, itching or texture change. Psych: Denies depression      Physical Exam:      General: Well developed, in no acute distress. HEENT: Eyes - PERRL, no jvd  Heart:  Normal S1/S2 negative S3 or S4. Regular, no murmur, gallop or rub. Respiratory: Clear bilaterally x 4, no wheezing or rales  Extremities:  No edema, normal cap refill, no cyanosis. Musculoskeletal: No clubbing  Neuro: A&Ox3, speech clear, gait stable. Skin: Skin color is normal. No rashes or lesions. Non diaphoretic. no ulcers  Vascular: 2+ pulses symmetric in all extremities  Psych - judgement intact and orientation is wnl       Cardiographics    Ekg: Sinus  Rhythm  - occasional ectopic ventricular beat     Low voltage in precordial leads.      Results for orders placed or performed during the hospital encounter of 12/22/20   EKG, 12 LEAD, INITIAL   Result Value Ref Range    Ventricular Rate 124 BPM    Atrial Rate 248 BPM    QRS Duration 80 ms    Q-T Interval 288 ms    QTC Calculation (Bezet) 413 ms    Calculated P Axis -97 degrees    Calculated R Axis 28 degrees    Calculated T Axis 147 degrees    Diagnosis       Atrial flutter with 2:1 AV conduction  Confirmed by Danielle Pizarro ([id number]) on 12/23/2020 11:02:41 PM           Lab Results   Component Value Date/Time    WBC 6.9 12/25/2020 03:04 AM    HGB 12.2 12/25/2020 03:04 AM    HCT 38.1 12/25/2020 03:04 AM    PLATELET 756 04/29/5477 03:04 AM    MCV 93.4 12/25/2020 03:04 AM      Lab Results   Component Value Date/Time    Sodium 136 12/25/2020 03:04 AM    Potassium 4.2 12/25/2020 03:04 AM    Chloride 106 12/25/2020 03:04 AM    CO2 24 12/25/2020 03:04 AM    Anion gap 6 12/25/2020 03:04 AM    Glucose 91 12/25/2020 03:04 AM    BUN 29 (H) 12/25/2020 03:04 AM    Creatinine 1.30 (H) 12/25/2020 03:04 AM    BUN/Creatinine ratio 22 (H) 12/25/2020 03:04 AM    GFR est AA 47 (L) 12/25/2020 03:04 AM    GFR est non-AA 38 (L) 12/25/2020 03:04 AM    Calcium 9.3 12/25/2020 03:04 AM    Bilirubin, total 0.5 12/23/2020 04:54 AM    Alk. phosphatase 90 12/23/2020 04:54 AM    Protein, total 6.4 12/23/2020 04:54 AM    Albumin 2.9 (L) 12/23/2020 04:54 AM    Globulin 3.5 12/23/2020 04:54 AM    A-G Ratio 0.8 (L) 12/23/2020 04:54 AM    ALT (SGPT) 16 12/23/2020 04:54 AM      Lab Results   Component Value Date/Time    TSH 0.53 12/22/2020 12:22 PM           Assessment:           ICD-10-CM ICD-9-CM    1. History of irregular heartbeat  Z86.79 V12.59 AMB POC EKG ROUTINE W/ 12 LEADS, INTER & REP      metoprolol succinate (TOPROL-XL) 25 mg XL tablet   2. Atrial flutter with rapid ventricular response (HCC)  I48.92 427.32 metoprolol succinate (TOPROL-XL) 25 mg XL tablet   3. PAC (premature atrial contraction)  I49.1 427.61 metoprolol succinate (TOPROL-XL) 25 mg XL tablet   4. Dilated cardiomyopathy (HCC)  I42.0 425.4      Orders Placed This Encounter    AMB POC EKG ROUTINE W/ 12 LEADS, INTER & REP     Order Specific Question:   Reason for Exam:     Answer:   routine    metoprolol succinate (TOPROL-XL) 25 mg XL tablet     Sig: Take 0.5 Tabs by mouth daily.      Dispense:  45 Tab     Refill:  1        Plan:     Ms Judson Latif is here for follow up s/p Counter clockwise isthmus dependent atrial flutter with successful RFA of the CTI with termination to sinus and confirmed bi-directional isthmus block, 12/23/2021. She remains in sinus on [address] Ok to D/c [address] in 10 days (1 mo therapy). Will change her bb to toprol as it is once daily. Follow up in 3 mo. Continue medical management for AFL, cardiomyopathy. Thank you for allowing me to participate in Loyd Shi 's care. Mikey Linder NP    Patient seen and examined by me with nurse practitioner. I personally performed all components of the history, physical, and medical decision making and agree with the assessment and plan with minor modifications as noted. In sinus sp afl ablation. Cont oac for 10 more days. Cont bb (will switch to toprol) for cardiomyopathy. Cont med rx for thyroid disease. F/u in 3 months.     Kari Mcclure MD, Kartik Peng

## 2021-01-21 ENCOUNTER — PATIENT OUTREACH (OUTPATIENT)
Dept: CASE MANAGEMENT | Age: 86
End: 2021-01-21

## 2021-01-22 NOTE — PROGRESS NOTES
Goals Addressed                 This Visit's Progress     Attends follow-up appointments as directed. On track     12/28/20   -Patient daughter did not feel that patient needed to F/U with PCP it is hard to get her in and out of care and with Tessa is is afraid to take her out. Explained that her PC may have virtual visits to call and check ([facility] PCP), also given name and # to contact [facility]. Monitor if patient did attend PCP appt on next call.    -Patient daughter is to call and make an appt with Cardiology, monitor that patient has an appt and attended on next call. Jose LARSON, RN, [facility]  Care Transition Nurse     01/07/21     -Patient daughter did not make an F/U appt with PCP, she did make an appt with Dr Teo Carrero for 1/12/21. Also given the # to [address] again. Monitor that patient did attend Cardiology appt as scheduled on next call. Jose LARSON, RN, [facility]  Care Transition Nurse       01/22/21  -Patient did attend her appt with Dr Teo Carrero on 1/12 as scheduled. Patient daughter is going to make PC appt today for hopefully next week as they really want her off the Xarelto. Monitor status of PCP F/U appt and Xarelto on next call. Jose LARSON, RN, [facility]  Care Transition Nurse        Patient/Family verbalizes understanding of self-management of chronic disease. On track     12/28/20   -Spoke with patient daughter Héctor Martinez she states that patient does not have a scale and she is not real certain that her mother would remember to weigh first thing in the Am independently.      -Daughter agreeable to getting scale and weighing patient the same time and recording daily. Monitor how weights are going on next call. -Review S/S HF, patient SOB is improved per daughter and she does have ankle edema which she always has. Patient lives in [address] [place] [address] and the food options are not as selective as before COVID.   Review S/S CHF on next call along with diet. Radha LARSON, RN, CCM  Care Transition Nurse     01/07/21   -Spoke with patient daughter Margarette Garcia, they did get a scale but are not weighing because patient cannot balance herself to weigh. Encouraged to put walker over scale and then step up and weigh, gives more support. -Reviewed low sodium diet. Patient eats a bigger breakfast and lunch and little supper. She has 2 partial plates and sometimes food gets stuck. Still eating food from facility and pick what she will eat weekly. Daughter was selecting more low potassium foods, discussed how sodium retains fluid and that is not wanted with HF. Discussed low sodium foods. Monitor patient daily weights and how low sodium diet is going on next call. Radha LARSON, RN, [facility]  Care Transition Nurse     01/22/21   -Spoke with patient daughter Margarette Garcia, patient is weighing every few days and is running 179lb. Asked what they would do if they noticed that patient weight has went up to 183 tomorrow and she stated they would call Cardiology. Trying to make better low sodium selections at the facility, choose menu for 1 week at a time and daughter brings in snacks. Monitor weight and and review more S/S CHF on next call. Radha LARSON, RN, [facility]  Care Transition Nurse        Supportive resources in place to maintain patient in the community (PT)   On track     12/28/20   -Patient had PT prior admit and contacted [facility] to let them know about patient D/C. Monitor that At [address] [place] [address] [facility] PT had resumed on next call. Radha LARSON RN, [facility]  Care Transition Nurse     1/7/21  -Patient is receiving PT X2 per week and is doing well. Monitor PT status on next call. Radha LARSON, RN, CCM  Care Transition Nurse     01/22/21     -Patient continues with PT X2 week. Monitor status of PT on next call.     Radha LARSON, RN, CCM  Care Transition Nurse

## 2021-02-05 ENCOUNTER — PATIENT OUTREACH (OUTPATIENT)
Dept: CASE MANAGEMENT | Age: 86
End: 2021-02-05

## 2021-02-05 NOTE — PROGRESS NOTES
Goals      Attends follow-up appointments as directed. 12/28/20   -Patient daughter did not feel that patient needed to F/U with PCP it is hard to get her in and out of care and with [facility]essa is is afraid to take her out. Explained that her PC may have virtual visits to call and check ([facility] PCP), also given name and # to contact [facility]. Monitor if patient did attend PCP appt on next call.    -Patient daughter is to call and make an appt with Cardiology, monitor that patient has an appt and attended on next call. Sidney Quintero MSN, RN, [facility]  Care Transition Nurse     01/07/21     -Patient daughter did not make an F/U appt with PCP, she did make an appt with Dr Yoav Griffiths for 1/12/21. Also given the # to Anand Mcmahon again. Monitor that patient did attend Cardiology appt as scheduled on next call. Sidney LARSON, RN, [facility]  Care Transition Nurse       01/22/21  -Patient did attend her appt with Dr Yoav Griffiths on 1/12 as scheduled. Patient daughter is going to make PC appt today for hopefully next week as they really want her off the Xarelto. Monitor status of PCP F/U appt and Xarelto on next call. Sidney LARSON, RN, [facility]  Care Transition Nurse     02/05/21  Called and spoke to patient who can't hear on the phone who then gave the phone to her caretaker. Caretaker is not aware of any PC appt that she went to or is scheduled. She believes the Xarelto is completed. Monitor status of PCP appt on next call. Sidney Quintero MSN, RN, [facility]  Care Transition Nurse        Patient/Family verbalizes understanding of self-management of chronic disease. 12/28/20   -Spoke with patient daughter Alicia Lujan she states that patient does not have a scale and she is not real certain that her mother would remember to weigh first thing in the Am independently.      -Daughter agreeable to getting scale and weighing patient the same time and recording daily.   Monitor how weights are going on next call.      -Review S/S HF, patient SOB is improved per daughter and she does have ankle edema which she always has. Patient lives in [address] [place] [address] and the food options are not as selective as before COVID. Review S/S CHF on next call along with diet. Khushi LARSON, RN, [facility]  Care Transition Nurse     01/07/21   -Spoke with patient daughter David Vaz, they did get a scale but are not weighing because patient cannot balance herself to weigh. Encouraged to put walker over scale and then step up and weigh, gives more support. -Reviewed low sodium diet. Patient eats a bigger breakfast and lunch and little supper. She has 2 partial plates and sometimes food gets stuck. Still eating food from facility and pick what she will eat weekly. Daughter was selecting more low potassium foods, discussed how sodium retains fluid and that is not wanted with HF. Discussed low sodium foods. Monitor patient daily weights and how low sodium diet is going on next call. Khushi LARSON, RN, [facility]  Care Transition Nurse     01/22/21   -Spoke with patient daughter David Vaz, patient is weighing every few days and is running 179lb. Asked what they would do if they noticed that patient weight has went up to 183 tomorrow and she stated they would call Cardiology. Trying to make better low sodium selections at the facility, choose menu for 1 week at a time and daughter brings in snacks. Monitor weight and and review more S/S CHF on next call. Khushi LARSON, RN, [facility]  Care Transition Nurse     02/05/21  Spoke with Caretaker, she came in to be with patient at 1pm.  She has not noticed any extra fluid. Patient is trying to eat foods lower in potassium per Caretaker. Monitor status of low sodium foods and daily weights.     Khushi LARSON, RN, [facility]  Care Transition Nurse            Supportive resources in place to maintain patient in the community (PT)      12/28/20   -Patient had PT prior admit and contacted At [facility] to let them know about patient D/C. Monitor that At [address] PT had resumed on next call. Tripp LARSON RN, [facility]  Care Transition Nurse     1/7/21  -Patient is receiving PT X2 per week and is doing well. Monitor PT status on next call. Tripp LARSON RN, [facility]  Care Transition Nurse     01/22/21     -Patient continues with PT X2 week. Monitor status of PT on next call. Tripp LARSON RN, [facility]  Care Transition Nurse     02/05/21   Patient received COVID vaccine first dose today at 11am.  Patient states that she is not hungry, feels a little cold. They do not have a thermometer and encouraged them to get 1. Patient continues with PT x2 week. Monitor PT status and how she tolerated the 1st COVID vaccine on next call.     Tripp LARSON, RN, [facility]  Care Transition Nurse

## 2021-02-22 ENCOUNTER — PATIENT OUTREACH (OUTPATIENT)
Dept: CASE MANAGEMENT | Age: 86
End: 2021-02-22

## 2021-02-22 NOTE — PROGRESS NOTES
Goals Addressed                 This Visit's Progress     COMPLETED: Attends follow-up appointments as directed. 12/28/20   -Patient daughter did not feel that patient needed to F/U with PCP it is hard to get her in and out of care and with Tessa is is afraid to take her out. Explained that her PC may have virtual visits to call and check ([facility] PCP), also given name and # to contact [facility]. Monitor if patient did attend PCP appt on next call.    -Patient daughter is to call and make an appt with Cardiology, monitor that patient has an appt and attended on next call. Lashae LARSON, RN, [facility]  Care Transition Nurse     01/07/21     -Patient daughter did not make an F/U appt with PCP, she did make an appt with Dr Jd Parra for 1/12/21. Also given the # to [employer] again. Monitor that patient did attend Cardiology appt as scheduled on next call. Lashae LARSON RN, [facility]  Care Transition Nurse       01/22/21  -Patient did attend her appt with Dr Jd Parra on 1/12 as scheduled. Patient daughter is going to make PC appt today for hopefully next week as they really want her off the Xarelto. Monitor status of PCP F/U appt and Xarelto on next call. Lashae LARSON, RN, [facility]  Care Transition Nurse     02/05/21  Called and spoke to patient who can't hear on the phone who then gave the phone to her caretaker. Caretaker is not aware of any PCP appt that she went to or is scheduled. She believes the Xarelto is completed. Monitor status of PCP appt on next call. Lashae LARSON, RN, [facility]  Care Transition Nurse     02/22/21   Per daughter patient is now off Xarelto and Toprol-XL. Patient daughter states they did attend PCP appt 2/2/21. Lashae LARSON, RN, [facility]  Care Transition Nurse        Patient/Family verbalizes understanding of self-management of chronic disease.    On track     12/28/20   -Spoke with patient daughter Kelly Calderón she states that patient does not have a scale and she is not real certain that her mother would remember to weigh first thing in the Am independently.      -Daughter agreeable to getting scale and weighing patient the same time and recording daily. Monitor how weights are going on next call. -Review S/S HF, patient SOB is improved per daughter and she does have ankle edema which she always has. Patient lives in [address] [place] [address] and the food options are not as selective as before COVID. Review S/S CHF on next call along with diet. Yary LARSON, RN, [facility]  Care Transition Nurse     01/07/21   -Spoke with patient daughter Jamee Choudhary, they did get a scale but are not weighing because patient cannot balance herself to weigh. Encouraged to put walker over scale and then step up and weigh, gives more support. -Reviewed low sodium diet. Patient eats a bigger breakfast and lunch and little supper. She has 2 partial plates and sometimes food gets stuck. Still eating food from facility and pick what she will eat weekly. Daughter was selecting more low potassium foods, discussed how sodium retains fluid and that is not wanted with HF. Discussed low sodium foods. Monitor patient daily weights and how low sodium diet is going on next call. Yary LARSON, RN, [facility]  Care Transition Nurse     01/22/21   -Spoke with patient daughter Jamee Choudhary, patient is weighing every few days and is running 179lb. Asked what they would do if they noticed that patient weight has went up to 183 tomorrow and she stated they would call Cardiology. Trying to make better low sodium selections at the facility, choose menu for 1 week at a time and daughter brings in snacks. Monitor weight and and review more S/S CHF on next call. Yary LARSON, RN, [facility]  Care Transition Nurse     02/05/21  Spoke with Caretaker, she came in to be with patient at 1pm.  She has not noticed any extra fluid.  Patient is trying to eat foods lower in potassium per Caretaker. Monitor status of low sodium foods and daily weights. Brendon LARSON, RN, [facility]  Care Transition Nurse       02/22/21   Called and spoke to patient daughter Norma Love, patient is not eating any food form the facility, all food is make in her apartment. Patient does not see her daughter until afternoon and patient does not have the memory to weight in the AM.  Patient daughter states that her appetite is not as great as before and she has lost a few lbs. Per daughter patient weight is now 170 lbs. Monitor diet and weight loss on next call. Brendon LARSON, RN, [facility]  Care Transition Nurse          Supportive resources in place to maintain patient in the community (PT)   On track     12/28/20   -Patient had PT prior admit and contacted At  [employer] to let them know about patient D/C. Monitor that At  [facility] PT had resumed on next call. Brendon LARSON, RN, [facility]  Care Transition Nurse     1/7/21  -Patient is receiving PT X2 per week and is doing well. Monitor PT status on next call. Brendon LARSON, RN, [facility]  Care Transition Nurse     01/22/21     -Patient continues with PT X2 week. Monitor status of PT on next call. Brendon LARSON RN, [facility]  Care Transition Nurse     02/05/21   Patient received COVID vaccine first dose today at 11am.  Patient states that she is not hungry, feels a little cold. They do not have a thermometer and encouraged them to get 1. Patient continues with PT x2 week. Monitor PT status and how she tolerated the 1st COVID vaccine on next call. Brendon LARSON, RN, [facility]  Care Transition Nurse     02/22/21   Tolerated her 1st COVID Vaccine OK, getting 2nd one March 5th. Continues to receive PT 1 time a week but that is coming to an end. Monitor 2nd COVID vaccine status and PT on next call. Brendon LARSON, RN, [facility]  Care Transition Nurse               Do not do NICO 2/27/21 for 2 weeks daughter is out of town.       Brendon LARSON, RN, [facility]  Care Transition Nurse

## 2021-03-18 ENCOUNTER — PATIENT OUTREACH (OUTPATIENT)
Dept: CASE MANAGEMENT | Age: 86
End: 2021-03-18

## 2021-03-25 ENCOUNTER — PATIENT OUTREACH (OUTPATIENT)
Dept: CASE MANAGEMENT | Age: 86
End: 2021-03-25

## 2021-03-25 NOTE — PROGRESS NOTES
Patient has graduated from the Bundle program on 3/25/21  Patient/family has the ability to self-manage at this time Care management goals have been completed. Patient was not referred to the [facility] team for further management. Goals Addressed                 This Visit's Progress     COMPLETED: Patient/Family verbalizes understanding of self-management of chronic disease. 12/28/20   -Spoke with patient daughter Paula Leija she states that patient does not have a scale and she is not real certain that her mother would remember to weigh first thing in the Am independently.      -Daughter agreeable to getting scale and weighing patient the same time and recording daily. Monitor how weights are going on next call. -Review S/S HF, patient SOB is improved per daughter and she does have ankle edema which she always has. Patient lives in [address] [place] [address] and the food options are not as selective as before [facility]. Review S/S CHF on next call along with diet. Cayetano LARSON, RN, [facility]  Care Transition Nurse     01/07/21   -Spoke with patient daughter Paula Leija, they did get a scale but are not weighing because patient cannot balance herself to weigh. Encouraged to put walker over scale and then step up and weigh, gives more support. -Reviewed low sodium diet. Patient eats a bigger breakfast and lunch and little supper. She has 2 partial plates and sometimes food gets stuck. Still eating food from facility and pick what she will eat weekly. Daughter was selecting more low potassium foods, discussed how sodium retains fluid and that is not wanted with HF. Discussed low sodium foods. Monitor patient daily weights and how low sodium diet is going on next call. Cayetano LARSON, RN, [facility]  Care Transition Nurse     01/22/21   -Spoke with patient daughter Paula Leija, patient is weighing every few days and is running 179lb.   Asked what they would do if they noticed that patient weight has went up to 183 tomorrow and she stated they would call Cardiology. Trying to make better low sodium selections at the facility, choose menu for 1 week at a time and daughter brings in snacks. Monitor weight and and review more S/S CHF on next call. Brendon LARSON, RN, [facility]  Care Transition Nurse     02/05/21  Spoke with Caretaker, she came in to be with patient at 1pm.  She has not noticed any extra fluid. Patient is trying to eat foods lower in potassium per Caretaker. Monitor status of low sodium foods and daily weights. Brendon LARSON, RN, [facility]  Care Transition Nurse       02/22/21   Called and spoke to patient daughter Norma Love, patient is not eating any food form the facility, all food is make in her apartment. Patient does not see her daughter until afternoon and patient does not have the memory to weight in the AM.  Patient daughter states that her appetite is not as great as before and she has lost a few lbs. Per daughter patient weight is now 170 lbs. Monitor diet and weight loss on next call. Brendon LARSON, RN, [facility]  Care Transition Nurse     03/25/21   Called and spoke to patient who could not understand what I was saying, then I called and talked to her daughter. Patient does not have SOB or cough, they are not weighing. Daughter cut her toenails the other day and did not notice any edema. Brendon LARSON, RN, [facility]/ Care Transition Nurse/ [phone]        COMPLETED: Supportive resources in place to maintain patient in the community (PT)        12/28/20   -Patient had PT prior admit and contacted At [facility] to let them know about patient D/C. Monitor that At [facility] PT had resumed on next call. Brendon LARSON, RN, [facility]  Care Transition Nurse     1/7/21  -Patient is receiving PT X2 per week and is doing well. Monitor PT status on next call.      Brendon LARSON RN, [facility]  Care Transition Nurse     01/22/21     -Patient continues with PT X2 week.    Monitor status of PT on next call. Watson LARSON, RN, [facility]  Care Transition Nurse     02/05/21   Patient received COVID vaccine first dose today at 11am.  Patient states that she is not hungry, feels a little cold. They do not have a thermometer and encouraged them to get 1. Patient continues with PT x2 week. Monitor PT status and how she tolerated the 1st COVID vaccine on next call. Watson LARSON, RN, [facility]  Care Transition Nurse     02/22/21   Tolerated her 1st COVID Vaccine OK, getting 2nd one March 5th. Continues to receive PT 1 time a week but that is coming to an end. Monitor 2nd COVID vaccine status and PT on next call. Watson LARSON, RN, [facility]  Care Transition Nurse     03/25/21   Patient has received her 2nd COVID vaccine and continues to have PT 1X week. Watson LARSON, RN, [facility]/ Care Transition Nurse/ [phone]               Patient has Care Transition Nurse's contact information for any further questions, concerns, or needs.   Patients upcoming visits:    Future Appointments   Date Time Provider Wei Dietrich   4/13/2021  1:30 PM Marie Panchal MD [facility] BS AMB

## 2022-12-26 ENCOUNTER — APPOINTMENT (OUTPATIENT)
Dept: GENERAL RADIOLOGY | Age: 87
DRG: 481 | End: 2022-12-26
Attending: EMERGENCY MEDICINE
Payer: MEDICARE

## 2022-12-26 ENCOUNTER — HOSPITAL ENCOUNTER (INPATIENT)
Age: 87
LOS: 7 days | Discharge: NURSING, REHAB OR LONG-TERM CARE | DRG: 481 | End: 2023-01-02
Attending: EMERGENCY MEDICINE | Admitting: FAMILY MEDICINE
Payer: MEDICARE

## 2022-12-26 ENCOUNTER — APPOINTMENT (OUTPATIENT)
Dept: GENERAL RADIOLOGY | Age: 87
DRG: 481 | End: 2022-12-26
Attending: ORTHOPAEDIC SURGERY
Payer: MEDICARE

## 2022-12-26 ENCOUNTER — ANESTHESIA EVENT (OUTPATIENT)
Dept: SURGERY | Age: 87
DRG: 481 | End: 2022-12-26
Payer: MEDICARE

## 2022-12-26 ENCOUNTER — ANESTHESIA (OUTPATIENT)
Dept: SURGERY | Age: 87
DRG: 481 | End: 2022-12-26
Payer: MEDICARE

## 2022-12-26 DIAGNOSIS — S72.002A FRACTURE, PROXIMAL FEMUR, LEFT, CLOSED, INITIAL ENCOUNTER (HCC): Primary | ICD-10-CM

## 2022-12-26 PROBLEM — S72.009A HIP FRACTURE (HCC): Status: ACTIVE | Noted: 2022-12-26

## 2022-12-26 LAB
ALBUMIN SERPL-MCNC: 3.4 G/DL (ref 3.5–5)
ALBUMIN/GLOB SERPL: 0.8 {RATIO} (ref 1.1–2.2)
ALP SERPL-CCNC: 123 U/L (ref 45–117)
ALT SERPL-CCNC: 16 U/L (ref 12–78)
ANION GAP SERPL CALC-SCNC: 8 MMOL/L (ref 5–15)
APPEARANCE UR: CLEAR
AST SERPL-CCNC: 29 U/L (ref 15–37)
ATRIAL RATE: 72 BPM
BACTERIA URNS QL MICRO: NEGATIVE /HPF
BASOPHILS # BLD: 0.1 K/UL (ref 0–0.1)
BASOPHILS NFR BLD: 1 % (ref 0–1)
BILIRUB SERPL-MCNC: 0.5 MG/DL (ref 0.2–1)
BILIRUB UR QL: NEGATIVE
BUN SERPL-MCNC: 15 MG/DL (ref 6–20)
BUN/CREAT SERPL: 12 (ref 12–20)
CALCIUM SERPL-MCNC: 9.5 MG/DL (ref 8.5–10.1)
CALCULATED P AXIS, ECG09: 87 DEGREES
CALCULATED R AXIS, ECG10: 52 DEGREES
CALCULATED T AXIS, ECG11: -65 DEGREES
CHLORIDE SERPL-SCNC: 105 MMOL/L (ref 97–108)
CO2 SERPL-SCNC: 28 MMOL/L (ref 21–32)
COLOR UR: ABNORMAL
CREAT SERPL-MCNC: 1.21 MG/DL (ref 0.55–1.02)
DIAGNOSIS, 93000: NORMAL
DIFFERENTIAL METHOD BLD: ABNORMAL
EOSINOPHIL # BLD: 0.1 K/UL (ref 0–0.4)
EOSINOPHIL NFR BLD: 1 % (ref 0–7)
EPITH CASTS URNS QL MICRO: ABNORMAL /LPF
ERYTHROCYTE [DISTWIDTH] IN BLOOD BY AUTOMATED COUNT: 13.2 % (ref 11.5–14.5)
GLOBULIN SER CALC-MCNC: 4.1 G/DL (ref 2–4)
GLUCOSE SERPL-MCNC: 109 MG/DL (ref 65–100)
GLUCOSE UR STRIP.AUTO-MCNC: NEGATIVE MG/DL
HCT VFR BLD AUTO: 41.2 % (ref 35–47)
HGB BLD-MCNC: 13.3 G/DL (ref 11.5–16)
HGB UR QL STRIP: NEGATIVE
IMM GRANULOCYTES # BLD AUTO: 0.1 K/UL (ref 0–0.04)
IMM GRANULOCYTES NFR BLD AUTO: 1 % (ref 0–0.5)
KETONES UR QL STRIP.AUTO: NEGATIVE MG/DL
LEUKOCYTE ESTERASE UR QL STRIP.AUTO: ABNORMAL
LYMPHOCYTES # BLD: 1 K/UL (ref 0.8–3.5)
LYMPHOCYTES NFR BLD: 10 % (ref 12–49)
MCH RBC QN AUTO: 30.1 PG (ref 26–34)
MCHC RBC AUTO-ENTMCNC: 32.3 G/DL (ref 30–36.5)
MCV RBC AUTO: 93.2 FL (ref 80–99)
MONOCYTES # BLD: 0.6 K/UL (ref 0–1)
MONOCYTES NFR BLD: 7 % (ref 5–13)
MUCOUS THREADS URNS QL MICRO: ABNORMAL /LPF
NEUTS SEG # BLD: 8 K/UL (ref 1.8–8)
NEUTS SEG NFR BLD: 80 % (ref 32–75)
NITRITE UR QL STRIP.AUTO: NEGATIVE
NRBC # BLD: 0 K/UL (ref 0–0.01)
NRBC BLD-RTO: 0 PER 100 WBC
P-R INTERVAL, ECG05: 200 MS
PH UR STRIP: 6 [PH] (ref 5–8)
PLATELET # BLD AUTO: 237 K/UL (ref 150–400)
PMV BLD AUTO: 9.7 FL (ref 8.9–12.9)
POTASSIUM SERPL-SCNC: 4.7 MMOL/L (ref 3.5–5.1)
PROT SERPL-MCNC: 7.5 G/DL (ref 6.4–8.2)
PROT UR STRIP-MCNC: ABNORMAL MG/DL
Q-T INTERVAL, ECG07: 382 MS
QRS DURATION, ECG06: 94 MS
QTC CALCULATION (BEZET), ECG08: 418 MS
RBC # BLD AUTO: 4.42 M/UL (ref 3.8–5.2)
RBC #/AREA URNS HPF: ABNORMAL /HPF (ref 0–5)
SODIUM SERPL-SCNC: 141 MMOL/L (ref 136–145)
SP GR UR REFRACTOMETRY: 1.02 (ref 1–1.03)
UR CULT HOLD, URHOLD: NORMAL
UROBILINOGEN UR QL STRIP.AUTO: 0.2 EU/DL (ref 0.2–1)
VENTRICULAR RATE, ECG03: 72 BPM
WBC # BLD AUTO: 9.9 K/UL (ref 3.6–11)
WBC URNS QL MICRO: ABNORMAL /HPF (ref 0–4)

## 2022-12-26 PROCEDURE — 77030042556 HC PNCL CAUT -B: Performed by: ORTHOPAEDIC SURGERY

## 2022-12-26 PROCEDURE — 73502 X-RAY EXAM HIP UNI 2-3 VIEWS: CPT

## 2022-12-26 PROCEDURE — 77030016474 HC BIT DRL QC3 SYNT -C: Performed by: ORTHOPAEDIC SURGERY

## 2022-12-26 PROCEDURE — 77030008684 HC TU ET CUF COVD -B: Performed by: ANESTHESIOLOGY

## 2022-12-26 PROCEDURE — C1713 ANCHOR/SCREW BN/BN,TIS/BN: HCPCS | Performed by: ORTHOPAEDIC SURGERY

## 2022-12-26 PROCEDURE — 77030018673: Performed by: ORTHOPAEDIC SURGERY

## 2022-12-26 PROCEDURE — 74011250636 HC RX REV CODE- 250/636: Performed by: EMERGENCY MEDICINE

## 2022-12-26 PROCEDURE — 2709999900 HC NON-CHARGEABLE SUPPLY: Performed by: ORTHOPAEDIC SURGERY

## 2022-12-26 PROCEDURE — 73501 X-RAY EXAM HIP UNI 1 VIEW: CPT

## 2022-12-26 PROCEDURE — 77030038552 HC DRN WND MDII -A: Performed by: ORTHOPAEDIC SURGERY

## 2022-12-26 PROCEDURE — 96374 THER/PROPH/DIAG INJ IV PUSH: CPT

## 2022-12-26 PROCEDURE — 99285 EMERGENCY DEPT VISIT HI MDM: CPT

## 2022-12-26 PROCEDURE — 74011000250 HC RX REV CODE- 250

## 2022-12-26 PROCEDURE — 74011250636 HC RX REV CODE- 250/636: Performed by: NURSE ANESTHETIST, CERTIFIED REGISTERED

## 2022-12-26 PROCEDURE — 77030031139 HC SUT VCRL2 J&J -A: Performed by: ORTHOPAEDIC SURGERY

## 2022-12-26 PROCEDURE — 74011000250 HC RX REV CODE- 250: Performed by: EMERGENCY MEDICINE

## 2022-12-26 PROCEDURE — 81001 URINALYSIS AUTO W/SCOPE: CPT

## 2022-12-26 PROCEDURE — 74011250636 HC RX REV CODE- 250/636: Performed by: PHYSICIAN ASSISTANT

## 2022-12-26 PROCEDURE — 36415 COLL VENOUS BLD VENIPUNCTURE: CPT

## 2022-12-26 PROCEDURE — 77030020788: Performed by: ORTHOPAEDIC SURGERY

## 2022-12-26 PROCEDURE — 76060000034 HC ANESTHESIA 1.5 TO 2 HR: Performed by: ORTHOPAEDIC SURGERY

## 2022-12-26 PROCEDURE — 74011250636 HC RX REV CODE- 250/636

## 2022-12-26 PROCEDURE — 93005 ELECTROCARDIOGRAM TRACING: CPT

## 2022-12-26 PROCEDURE — BW1C1ZZ FLUOROSCOPY OF LOWER EXTREMITY USING LOW OSMOLAR CONTRAST: ICD-10-PCS | Performed by: ORTHOPAEDIC SURGERY

## 2022-12-26 PROCEDURE — 74011250637 HC RX REV CODE- 250/637

## 2022-12-26 PROCEDURE — 85025 COMPLETE CBC W/AUTO DIFF WBC: CPT

## 2022-12-26 PROCEDURE — 74011250636 HC RX REV CODE- 250/636: Performed by: ANESTHESIOLOGY

## 2022-12-26 PROCEDURE — 0QH706Z INSERTION OF INTRAMEDULLARY INTERNAL FIXATION DEVICE INTO LEFT UPPER FEMUR, OPEN APPROACH: ICD-10-PCS | Performed by: ORTHOPAEDIC SURGERY

## 2022-12-26 PROCEDURE — P9045 ALBUMIN (HUMAN), 5%, 250 ML: HCPCS | Performed by: NURSE ANESTHETIST, CERTIFIED REGISTERED

## 2022-12-26 PROCEDURE — 76210000000 HC OR PH I REC 2 TO 2.5 HR: Performed by: ORTHOPAEDIC SURGERY

## 2022-12-26 PROCEDURE — 77030026438 HC STYL ET INTUB CARD -A: Performed by: ANESTHESIOLOGY

## 2022-12-26 PROCEDURE — 65270000029 HC RM PRIVATE

## 2022-12-26 PROCEDURE — 77030002933 HC SUT MCRYL J&J -A: Performed by: ORTHOPAEDIC SURGERY

## 2022-12-26 PROCEDURE — 76010000153 HC OR TIME 1.5 TO 2 HR: Performed by: ORTHOPAEDIC SURGERY

## 2022-12-26 PROCEDURE — 74011000250 HC RX REV CODE- 250: Performed by: NURSE ANESTHETIST, CERTIFIED REGISTERED

## 2022-12-26 PROCEDURE — 74011000250 HC RX REV CODE- 250: Performed by: PHYSICIAN ASSISTANT

## 2022-12-26 PROCEDURE — 77030008462 HC STPLR SKN PROX J&J -A: Performed by: ORTHOPAEDIC SURGERY

## 2022-12-26 PROCEDURE — 80053 COMPREHEN METABOLIC PANEL: CPT

## 2022-12-26 PROCEDURE — C1769 GUIDE WIRE: HCPCS | Performed by: ORTHOPAEDIC SURGERY

## 2022-12-26 PROCEDURE — 71045 X-RAY EXAM CHEST 1 VIEW: CPT

## 2022-12-26 PROCEDURE — 77030013079 HC BLNKT BAIR HGGR 3M -A: Performed by: ANESTHESIOLOGY

## 2022-12-26 DEVICE — IMPLANTABLE DEVICE: Type: IMPLANTABLE DEVICE | Site: HIP | Status: FUNCTIONAL

## 2022-12-26 DEVICE — SCREW BNE L44MM DIA5MM NONSTERILE TIB LT GRN TI ST CANN LOK: Type: IMPLANTABLE DEVICE | Site: HIP | Status: FUNCTIONAL

## 2022-12-26 DEVICE — 10MM/130 DEG TI CANN TFNA 235MM/LEFT - STERILE
Type: IMPLANTABLE DEVICE | Site: HIP | Status: FUNCTIONAL
Brand: TFN-ADVANCE

## 2022-12-26 RX ORDER — ACETAMINOPHEN 650 MG/1
650 SUPPOSITORY RECTAL
Status: DISCONTINUED | OUTPATIENT
Start: 2022-12-26 | End: 2022-12-26

## 2022-12-26 RX ORDER — FENTANYL CITRATE 50 UG/ML
50 INJECTION, SOLUTION INTRAMUSCULAR; INTRAVENOUS AS NEEDED
Status: DISCONTINUED | OUTPATIENT
Start: 2022-12-26 | End: 2022-12-26 | Stop reason: HOSPADM

## 2022-12-26 RX ORDER — ALBUMIN HUMAN 50 G/1000ML
SOLUTION INTRAVENOUS AS NEEDED
Status: DISCONTINUED | OUTPATIENT
Start: 2022-12-26 | End: 2022-12-26 | Stop reason: HOSPADM

## 2022-12-26 RX ORDER — TRAZODONE HYDROCHLORIDE 50 MG/1
50 TABLET ORAL
Status: DISCONTINUED | OUTPATIENT
Start: 2022-12-26 | End: 2023-01-02 | Stop reason: HOSPADM

## 2022-12-26 RX ORDER — SODIUM CHLORIDE 0.9 % (FLUSH) 0.9 %
5-40 SYRINGE (ML) INJECTION EVERY 8 HOURS
Status: CANCELLED | OUTPATIENT
Start: 2022-12-26

## 2022-12-26 RX ORDER — OXYCODONE AND ACETAMINOPHEN 5; 325 MG/1; MG/1
1 TABLET ORAL AS NEEDED
Status: DISCONTINUED | OUTPATIENT
Start: 2022-12-26 | End: 2022-12-26 | Stop reason: HOSPADM

## 2022-12-26 RX ORDER — ONDANSETRON 2 MG/ML
INJECTION INTRAMUSCULAR; INTRAVENOUS AS NEEDED
Status: DISCONTINUED | OUTPATIENT
Start: 2022-12-26 | End: 2022-12-26 | Stop reason: HOSPADM

## 2022-12-26 RX ORDER — SUCCINYLCHOLINE CHLORIDE 20 MG/ML
INJECTION INTRAMUSCULAR; INTRAVENOUS AS NEEDED
Status: DISCONTINUED | OUTPATIENT
Start: 2022-12-26 | End: 2022-12-26 | Stop reason: HOSPADM

## 2022-12-26 RX ORDER — PROPOFOL 10 MG/ML
INJECTION, EMULSION INTRAVENOUS AS NEEDED
Status: DISCONTINUED | OUTPATIENT
Start: 2022-12-26 | End: 2022-12-26 | Stop reason: HOSPADM

## 2022-12-26 RX ORDER — METOPROLOL SUCCINATE 25 MG/1
12.5 TABLET, EXTENDED RELEASE ORAL DAILY
Status: DISCONTINUED | OUTPATIENT
Start: 2022-12-27 | End: 2022-12-27

## 2022-12-26 RX ORDER — SODIUM CHLORIDE 0.9 % (FLUSH) 0.9 %
5-40 SYRINGE (ML) INJECTION AS NEEDED
Status: DISCONTINUED | OUTPATIENT
Start: 2022-12-26 | End: 2023-01-02 | Stop reason: HOSPADM

## 2022-12-26 RX ORDER — MORPHINE SULFATE 2 MG/ML
2 INJECTION, SOLUTION INTRAMUSCULAR; INTRAVENOUS
Status: DISCONTINUED | OUTPATIENT
Start: 2022-12-26 | End: 2022-12-26 | Stop reason: HOSPADM

## 2022-12-26 RX ORDER — SODIUM CHLORIDE 0.9 % (FLUSH) 0.9 %
5-40 SYRINGE (ML) INJECTION EVERY 8 HOURS
Status: DISCONTINUED | OUTPATIENT
Start: 2022-12-26 | End: 2023-01-02

## 2022-12-26 RX ORDER — QUETIAPINE FUMARATE 25 MG/1
12.5 TABLET, FILM COATED ORAL
Status: DISCONTINUED | OUTPATIENT
Start: 2022-12-26 | End: 2023-01-02 | Stop reason: HOSPADM

## 2022-12-26 RX ORDER — DONEPEZIL HYDROCHLORIDE 10 MG/1
10 TABLET, FILM COATED ORAL
Status: DISCONTINUED | OUTPATIENT
Start: 2022-12-26 | End: 2023-01-02 | Stop reason: HOSPADM

## 2022-12-26 RX ORDER — SODIUM CHLORIDE, SODIUM LACTATE, POTASSIUM CHLORIDE, CALCIUM CHLORIDE 600; 310; 30; 20 MG/100ML; MG/100ML; MG/100ML; MG/100ML
75 INJECTION, SOLUTION INTRAVENOUS CONTINUOUS
Status: DISCONTINUED | OUTPATIENT
Start: 2022-12-26 | End: 2022-12-26 | Stop reason: HOSPADM

## 2022-12-26 RX ORDER — NEOSTIGMINE METHYLSULFATE 1 MG/ML
INJECTION, SOLUTION INTRAVENOUS AS NEEDED
Status: DISCONTINUED | OUTPATIENT
Start: 2022-12-26 | End: 2022-12-26 | Stop reason: HOSPADM

## 2022-12-26 RX ORDER — MORPHINE SULFATE 4 MG/ML
4 INJECTION INTRAVENOUS ONCE
Status: COMPLETED | OUTPATIENT
Start: 2022-12-26 | End: 2022-12-26

## 2022-12-26 RX ORDER — LEVOTHYROXINE SODIUM 112 UG/1
112 TABLET ORAL
Status: DISCONTINUED | OUTPATIENT
Start: 2022-12-27 | End: 2023-01-02 | Stop reason: HOSPADM

## 2022-12-26 RX ORDER — ACETAMINOPHEN 325 MG/1
650 TABLET ORAL
Status: DISCONTINUED | OUTPATIENT
Start: 2022-12-26 | End: 2022-12-26

## 2022-12-26 RX ORDER — MIDAZOLAM HYDROCHLORIDE 1 MG/ML
1 INJECTION, SOLUTION INTRAMUSCULAR; INTRAVENOUS AS NEEDED
Status: DISCONTINUED | OUTPATIENT
Start: 2022-12-26 | End: 2022-12-26 | Stop reason: HOSPADM

## 2022-12-26 RX ORDER — POLYETHYLENE GLYCOL 3350 17 G/17G
17 POWDER, FOR SOLUTION ORAL DAILY PRN
Status: DISCONTINUED | OUTPATIENT
Start: 2022-12-26 | End: 2023-01-02 | Stop reason: HOSPADM

## 2022-12-26 RX ORDER — FENTANYL CITRATE 50 UG/ML
INJECTION, SOLUTION INTRAMUSCULAR; INTRAVENOUS AS NEEDED
Status: DISCONTINUED | OUTPATIENT
Start: 2022-12-26 | End: 2022-12-26 | Stop reason: HOSPADM

## 2022-12-26 RX ORDER — ACETAMINOPHEN 325 MG/1
650 TABLET ORAL EVERY 6 HOURS
Status: DISCONTINUED | OUTPATIENT
Start: 2022-12-26 | End: 2023-01-02 | Stop reason: HOSPADM

## 2022-12-26 RX ORDER — ONDANSETRON 4 MG/1
4 TABLET, ORALLY DISINTEGRATING ORAL
Status: DISCONTINUED | OUTPATIENT
Start: 2022-12-26 | End: 2023-01-02 | Stop reason: HOSPADM

## 2022-12-26 RX ORDER — DEXAMETHASONE SODIUM PHOSPHATE 4 MG/ML
INJECTION, SOLUTION INTRA-ARTICULAR; INTRALESIONAL; INTRAMUSCULAR; INTRAVENOUS; SOFT TISSUE AS NEEDED
Status: DISCONTINUED | OUTPATIENT
Start: 2022-12-26 | End: 2022-12-26 | Stop reason: HOSPADM

## 2022-12-26 RX ORDER — SODIUM CHLORIDE 0.9 % (FLUSH) 0.9 %
5-40 SYRINGE (ML) INJECTION AS NEEDED
Status: DISCONTINUED | OUTPATIENT
Start: 2022-12-26 | End: 2022-12-26 | Stop reason: HOSPADM

## 2022-12-26 RX ORDER — SODIUM CHLORIDE 9 MG/ML
50 INJECTION, SOLUTION INTRAVENOUS CONTINUOUS
Status: DISCONTINUED | OUTPATIENT
Start: 2022-12-26 | End: 2022-12-26 | Stop reason: HOSPADM

## 2022-12-26 RX ORDER — MORPHINE SULFATE 2 MG/ML
2 INJECTION, SOLUTION INTRAMUSCULAR; INTRAVENOUS
Status: DISCONTINUED | OUTPATIENT
Start: 2022-12-26 | End: 2023-01-01

## 2022-12-26 RX ORDER — ENOXAPARIN SODIUM 100 MG/ML
30 INJECTION SUBCUTANEOUS DAILY
Status: DISCONTINUED | OUTPATIENT
Start: 2022-12-27 | End: 2022-12-27

## 2022-12-26 RX ORDER — SODIUM CHLORIDE 9 MG/ML
INJECTION, SOLUTION INTRAVENOUS
Status: DISCONTINUED | OUTPATIENT
Start: 2022-12-26 | End: 2022-12-26 | Stop reason: HOSPADM

## 2022-12-26 RX ORDER — HYDROMORPHONE HYDROCHLORIDE 1 MG/ML
0.2 INJECTION, SOLUTION INTRAMUSCULAR; INTRAVENOUS; SUBCUTANEOUS
Status: DISCONTINUED | OUTPATIENT
Start: 2022-12-26 | End: 2022-12-26 | Stop reason: HOSPADM

## 2022-12-26 RX ORDER — SODIUM CHLORIDE 0.9 % (FLUSH) 0.9 %
5-40 SYRINGE (ML) INJECTION EVERY 8 HOURS
Status: DISCONTINUED | OUTPATIENT
Start: 2022-12-26 | End: 2022-12-26 | Stop reason: HOSPADM

## 2022-12-26 RX ORDER — MIDAZOLAM HYDROCHLORIDE 1 MG/ML
0.5 INJECTION, SOLUTION INTRAMUSCULAR; INTRAVENOUS
Status: DISCONTINUED | OUTPATIENT
Start: 2022-12-26 | End: 2022-12-26 | Stop reason: HOSPADM

## 2022-12-26 RX ORDER — PHENYLEPHRINE HCL IN 0.9% NACL 0.4MG/10ML
SYRINGE (ML) INTRAVENOUS AS NEEDED
Status: DISCONTINUED | OUTPATIENT
Start: 2022-12-26 | End: 2022-12-26 | Stop reason: HOSPADM

## 2022-12-26 RX ORDER — FENTANYL CITRATE 50 UG/ML
25 INJECTION, SOLUTION INTRAMUSCULAR; INTRAVENOUS
Status: DISCONTINUED | OUTPATIENT
Start: 2022-12-26 | End: 2022-12-26 | Stop reason: HOSPADM

## 2022-12-26 RX ORDER — GLYCOPYRROLATE 0.2 MG/ML
INJECTION INTRAMUSCULAR; INTRAVENOUS AS NEEDED
Status: DISCONTINUED | OUTPATIENT
Start: 2022-12-26 | End: 2022-12-26 | Stop reason: HOSPADM

## 2022-12-26 RX ORDER — ONDANSETRON 2 MG/ML
4 INJECTION INTRAMUSCULAR; INTRAVENOUS
Status: DISCONTINUED | OUTPATIENT
Start: 2022-12-26 | End: 2023-01-02 | Stop reason: HOSPADM

## 2022-12-26 RX ORDER — SODIUM CHLORIDE 9 MG/ML
75 INJECTION, SOLUTION INTRAVENOUS CONTINUOUS
Status: CANCELLED | OUTPATIENT
Start: 2022-12-26

## 2022-12-26 RX ORDER — NALOXONE HYDROCHLORIDE 0.4 MG/ML
0.4 INJECTION, SOLUTION INTRAMUSCULAR; INTRAVENOUS; SUBCUTANEOUS AS NEEDED
Status: CANCELLED | OUTPATIENT
Start: 2022-12-26

## 2022-12-26 RX ORDER — DIPHENHYDRAMINE HYDROCHLORIDE 50 MG/ML
12.5 INJECTION, SOLUTION INTRAMUSCULAR; INTRAVENOUS AS NEEDED
Status: DISCONTINUED | OUTPATIENT
Start: 2022-12-26 | End: 2022-12-26 | Stop reason: HOSPADM

## 2022-12-26 RX ORDER — SODIUM CHLORIDE, SODIUM LACTATE, POTASSIUM CHLORIDE, CALCIUM CHLORIDE 600; 310; 30; 20 MG/100ML; MG/100ML; MG/100ML; MG/100ML
INJECTION, SOLUTION INTRAVENOUS
Status: DISCONTINUED | OUTPATIENT
Start: 2022-12-26 | End: 2022-12-26

## 2022-12-26 RX ORDER — ROCURONIUM BROMIDE 10 MG/ML
INJECTION, SOLUTION INTRAVENOUS AS NEEDED
Status: DISCONTINUED | OUTPATIENT
Start: 2022-12-26 | End: 2022-12-26 | Stop reason: HOSPADM

## 2022-12-26 RX ORDER — LIDOCAINE HYDROCHLORIDE 20 MG/ML
INJECTION, SOLUTION EPIDURAL; INFILTRATION; INTRACAUDAL; PERINEURAL AS NEEDED
Status: DISCONTINUED | OUTPATIENT
Start: 2022-12-26 | End: 2022-12-26 | Stop reason: HOSPADM

## 2022-12-26 RX ORDER — OXYCODONE HYDROCHLORIDE 5 MG/1
5 TABLET ORAL
Status: DISCONTINUED | OUTPATIENT
Start: 2022-12-26 | End: 2023-01-01

## 2022-12-26 RX ORDER — LIDOCAINE HYDROCHLORIDE 10 MG/ML
0.1 INJECTION, SOLUTION EPIDURAL; INFILTRATION; INTRACAUDAL; PERINEURAL AS NEEDED
Status: DISCONTINUED | OUTPATIENT
Start: 2022-12-26 | End: 2022-12-26 | Stop reason: HOSPADM

## 2022-12-26 RX ORDER — ONDANSETRON 2 MG/ML
4 INJECTION INTRAMUSCULAR; INTRAVENOUS AS NEEDED
Status: DISCONTINUED | OUTPATIENT
Start: 2022-12-26 | End: 2022-12-26 | Stop reason: HOSPADM

## 2022-12-26 RX ORDER — SODIUM CHLORIDE 0.9 % (FLUSH) 0.9 %
5-40 SYRINGE (ML) INJECTION AS NEEDED
Status: CANCELLED | OUTPATIENT
Start: 2022-12-26

## 2022-12-26 RX ADMIN — MORPHINE SULFATE 4 MG: 4 INJECTION, SOLUTION INTRAMUSCULAR; INTRAVENOUS at 08:00

## 2022-12-26 RX ADMIN — DONEPEZIL HYDROCHLORIDE 10 MG: 10 TABLET, FILM COATED ORAL at 21:41

## 2022-12-26 RX ADMIN — Medication 80 MCG: at 18:02

## 2022-12-26 RX ADMIN — FENTANYL CITRATE 25 MCG: 50 INJECTION, SOLUTION INTRAMUSCULAR; INTRAVENOUS at 20:05

## 2022-12-26 RX ADMIN — ALBUMIN (HUMAN) 250 ML: 12.5 INJECTION, SOLUTION INTRAVENOUS at 17:37

## 2022-12-26 RX ADMIN — FENTANYL CITRATE 50 MCG: 50 INJECTION INTRAMUSCULAR; INTRAVENOUS at 17:47

## 2022-12-26 RX ADMIN — ACETAMINOPHEN 650 MG: 325 TABLET ORAL at 14:09

## 2022-12-26 RX ADMIN — MORPHINE SULFATE 2 MG: 2 INJECTION, SOLUTION INTRAMUSCULAR; INTRAVENOUS at 21:42

## 2022-12-26 RX ADMIN — ACETAMINOPHEN 650 MG: 325 TABLET ORAL at 21:41

## 2022-12-26 RX ADMIN — Medication 80 MCG: at 17:28

## 2022-12-26 RX ADMIN — Medication 3 MG: at 18:16

## 2022-12-26 RX ADMIN — PROPOFOL 80 MG: 10 INJECTION, EMULSION INTRAVENOUS at 17:15

## 2022-12-26 RX ADMIN — MORPHINE SULFATE 2 MG: 2 INJECTION, SOLUTION INTRAMUSCULAR; INTRAVENOUS at 14:09

## 2022-12-26 RX ADMIN — PHENYLEPHRINE HYDROCHLORIDE 40 MCG/MIN: 10 INJECTION INTRAVENOUS at 17:28

## 2022-12-26 RX ADMIN — ONDANSETRON HYDROCHLORIDE 4 MG: 2 INJECTION, SOLUTION INTRAMUSCULAR; INTRAVENOUS at 18:11

## 2022-12-26 RX ADMIN — WATER 2 G: 1 INJECTION INTRAMUSCULAR; INTRAVENOUS; SUBCUTANEOUS at 17:33

## 2022-12-26 RX ADMIN — ROCURONIUM BROMIDE 10 MG: 10 SOLUTION INTRAVENOUS at 17:15

## 2022-12-26 RX ADMIN — TRAZODONE HYDROCHLORIDE 50 MG: 50 TABLET ORAL at 21:41

## 2022-12-26 RX ADMIN — SUCCINYLCHOLINE CHLORIDE 140 MG: 20 INJECTION, SOLUTION INTRAMUSCULAR; INTRAVENOUS at 17:16

## 2022-12-26 RX ADMIN — FENTANYL CITRATE 25 MCG: 50 INJECTION, SOLUTION INTRAMUSCULAR; INTRAVENOUS at 20:00

## 2022-12-26 RX ADMIN — Medication 80 MCG: at 17:15

## 2022-12-26 RX ADMIN — FENTANYL CITRATE 50 MCG: 50 INJECTION INTRAMUSCULAR; INTRAVENOUS at 17:15

## 2022-12-26 RX ADMIN — QUETIAPINE FUMARATE 12.5 MG: 25 TABLET ORAL at 21:41

## 2022-12-26 RX ADMIN — FENTANYL CITRATE 25 MCG: 50 INJECTION, SOLUTION INTRAMUSCULAR; INTRAVENOUS at 19:55

## 2022-12-26 RX ADMIN — GLYCOPYRROLATE 0.4 MG: 0.2 INJECTION INTRAMUSCULAR; INTRAVENOUS at 18:16

## 2022-12-26 RX ADMIN — SODIUM CHLORIDE: 900 INJECTION, SOLUTION INTRAVENOUS at 17:10

## 2022-12-26 RX ADMIN — ROCURONIUM BROMIDE 40 MG: 10 SOLUTION INTRAVENOUS at 17:20

## 2022-12-26 RX ADMIN — FENTANYL CITRATE 25 MCG: 50 INJECTION, SOLUTION INTRAMUSCULAR; INTRAVENOUS at 19:50

## 2022-12-26 RX ADMIN — SODIUM CHLORIDE, PRESERVATIVE FREE 10 ML: 5 INJECTION INTRAVENOUS at 14:09

## 2022-12-26 RX ADMIN — LIDOCAINE HYDROCHLORIDE 40 MG: 20 INJECTION, SOLUTION EPIDURAL; INFILTRATION; INTRACAUDAL; PERINEURAL at 17:15

## 2022-12-26 RX ADMIN — DEXAMETHASONE SODIUM PHOSPHATE 4 MG: 4 INJECTION, SOLUTION INTRAMUSCULAR; INTRAVENOUS at 17:30

## 2022-12-26 RX ADMIN — SODIUM CHLORIDE, PRESERVATIVE FREE 10 ML: 5 INJECTION INTRAVENOUS at 08:01

## 2022-12-26 RX ADMIN — SODIUM CHLORIDE, PRESERVATIVE FREE 10 ML: 5 INJECTION INTRAVENOUS at 14:10

## 2022-12-26 NOTE — OP NOTES
Operative Note    Patient: Alexys Kendrick  YOB: 1930  MRN: [id number]    Date of Procedure: 12/26/2022     Pre-Op Diagnosis: LEFT HIP FRACTURE    Post-Op Diagnosis: Same as preoperative diagnosis. Procedure(s):  SYNTHES TFN NAIL    Surgeon(s):  Tyler Calderon MD    Surgical Assistant: None    Anesthesia: General     Estimated Blood Loss (mL):  less than 369     Complications: None    Specimens: * No specimens in log *     Implants:   Implant Name Type Inv.  Item Serial No.  Lot No. LRB No. Used Action   NAIL IM L235MM [id number] 130DEG SHT L PROX FEM GRN TI ZHANNA - SN/A  NAIL IM L235MM [id number] 130DEG SHT L PROX FEM GRN TI ZHANNA N/A DE[employer]_ [id number] Left 1 Implanted   BLADE IM L100MM DIA10.35MM PROX FEM G TI ZHANNA FEN ESPERANZA FOR - SN/A  BLADE IM L100MM DIA10.35MM PROX FEM G TI ZHANNA FEN ESPERANZA FOR N/A DEPUY [employer] [facility] [id number] Left 1 Implanted   NAIL IM 5X28 MM TIB W/ T25 STARDRV FOR LCK SCREW TI NS - SN/A  NAIL IM 5X28 MM TIB W/ T25 STARDRV FOR LCK SCREW TI NS N/A DEPU[employer]_ N/A Left 1 Implanted       Drains: * No LDAs found *    Findings: fx    Detailed Description of Procedure:   Dictated - [id number]    Electronically Signed by Seng King MD on 12/26/2022 at 6:11 PM

## 2022-12-26 NOTE — BRIEF OP NOTE
Brief Postoperative Note    Patient: Cala Rinne  YOB: 1930  MRN: [id number]    Date of Procedure: 12/26/2022     Pre-Op Diagnosis: LEFT HIP FRACTURE    Post-Op Diagnosis: Same as preoperative diagnosis. Procedure(s):  SYNTHES TFN NAIL    Surgeon(s):  Dillan Nunez MD    Surgical Assistant: None    Anesthesia: General     Estimated Blood Loss (mL): less than 334     Complications: None    Specimens: * No specimens in log *     Implants:   Implant Name Type Inv.  Item Serial No.  Lot No. LRB No. Used Action   NAIL IM L235MM [id number] 130DEG SHT L PROX FEM GRN TI ZHANNA - SN/A  NAIL IM L235MM [id number] 130DEG SHT L PROX FEM GRN TI ZHANNA N/A DEPUY [employer] [facility] [id number] Left 1 Implanted   BLADE IM L100MM DIA10.35MM PROX FEM G TI ZHANNA FEN ESPERANZA FOR - SN/A  BLADE IM L100MM DIA10.35MM PROX FEM G TI ZHANNA FEN ESPER[employer]ZA FOR N/A [employer] [facility] [id number] Left 1 Implanted   NAIL IM 5X28 MM TIB W/ T25 STARDRV FOR LCK SCREW TI NS - SN/A  NAIL IM 5X28 MM TIB W/ T25 STARDRV FOR LCK SCREW TI NS N/A [employer]_ N/A Left 1 Implanted       Drains: * No LDAs found *    Findings: fx    Electronically Signed by Colt Marsh MD on 12/26/2022 at 6:11 PM

## 2022-12-26 NOTE — ED NOTES
[facility] at the bedside to transport patient to [address] [place] [address]. [facility] provided with a copy of patient's durable DNR.    Patient's daughter informed that she will be transferred to [facility].

## 2022-12-26 NOTE — ANESTHESIA POSTPROCEDURE EVALUATION
Procedure(s):  SYNTHES TFN NAIL. general    Anesthesia Post Evaluation      Multimodal analgesia: multimodal analgesia used between 6 hours prior to anesthesia start to PACU discharge  Patient location during evaluation: PACU  Patient participation: complete - patient participated  Level of consciousness: awake  Pain management: adequate  Airway patency: patent  Anesthetic complications: no  Cardiovascular status: acceptable  Respiratory status: acceptable  Hydration status: acceptable  Comments: Seen, no complaints   Post anesthesia nausea and vomiting:  none  Final Post Anesthesia Temperature Assessment:  Normothermia (36.0-37.5 degrees C)      INITIAL Post-op Vital signs:   Vitals Value Taken Time   /61 12/26/22 1851   Temp 36.8 °C (98.2 °F) 12/26/22 1851   Pulse 117 12/26/22 1852   Resp 21 12/26/22 1852   SpO2 97 % 12/26/22 1852   Vitals shown include unvalidated device data.

## 2022-12-26 NOTE — ANESTHESIA PREPROCEDURE EVALUATION
Relevant Problems   No relevant active problems       Anesthetic History   No history of anesthetic complications            Review of Systems / Medical History  Patient summary reviewed, nursing notes reviewed and pertinent labs reviewed    Pulmonary        Sleep apnea  Shortness of breath         Neuro/Psych         Dementia    Comments: Kyphoscoliosis (M41.9) Cardiovascular            Dysrhythmias : atrial flutter      Exercise tolerance: <4 METS  Comments: Cardiomyopathy (HCC) (I42.9)   GI/Hepatic/Renal         Renal disease       Endo/Other      Hypothyroidism  Blood dyscrasia and arthritis     Other Findings              Physical Exam    Airway  Mallampati: IV  TM Distance: 4 - 6 cm  Neck ROM: decreased range of motion   Mouth opening: Diminished (comment)     Cardiovascular  Regular rate and rhythm,  S1 and S2 normal,  no murmur, click, rub, or gallop  Rhythm: regular  Rate: normal         Dental    Dentition: Caps/crowns     Pulmonary      Decreased breath sounds: bilateral           Abdominal  GI exam deferred       Other Findings            Anesthetic Plan    ASA: 4  Anesthesia type: general          Induction: Intravenous  Anesthetic plan and risks discussed with: Son / Daughter and Legal guardian      DNR suspened, Azalea Pore daughter consents obtained

## 2022-12-26 NOTE — CONSULTS
Geriatric Fracture Consult  Patient: Sosa Calderon  YOB: 1930   MRN: [id number]      Consult Date: 12/26/2022     Consultant: Reina Durham PA-C    Chief Complaint: Left hip pain, proximal femur fracture  ED Presentation Time:  Transferred from [place] ER this morning  Mechanism of Injury: Unknown    Past Medical History:   Past Medical History:   Diagnosis Date    Arthritis     Broken leg     Right leg    Cardiomyopathy ([address] 12/23/2020    Dementia ([address] 12/23/2020    Dyspnea     Enlarged heart     Fracture     Hypercholesterolemia     Hyperglycemia     Hypothyroidism     Kyphoscoliosis     Malignant neoplasm (HCC)     of kidney, except pelvis    Obstructive sleep apnea     Osteoarthritis     Pain, abdominal     RA (rheumatoid arthritis) ([address]     Renal cell carcinoma (HCC)        Allergies: Allergies   Allergen Reactions    Ismo [Isosorbide Mononitrate] Not Reported This Time    Penicillins Unknown (comments)    Plaquenil [Hydroxychloroquine] Other (comments)     Lost a lot of her skin. Really bad. Prednisone Itching     Very itchy.       Past Surgical History:   Past Surgical History:   Procedure Laterality Date    ENDOSCOPY, COLON, DIAGNOSTIC      HX BREAST LUMPECTOMY      x 4, No cancer found    HX CARPAL TUNNEL RELEASE      Left hand    HX CATARACT REMOVAL      both eyes    HX NEPHRECTOMY  2005    kidney cancer    HX PREMALIG/BENIGN SKIN LESION EXCISION      precancerous growth removed from back; 2 masses removed from hand and arm    HX THYROIDECTOMY      HX THYROIDECTOMY      [place] COMPRE ELECTROPHYSIOL XM W/LEFT ATRIAL PACNG/REC N/A 12/23/2020    Lt Atrial Pace & Record During Ep Study performed by Nia Person MD at [facility] CARDIAC CATH LAB    [place] COMPRE EP EVAL ABLTJ 3D MAPG TX SVT N/A 12/23/2020    ABLATION A-FLUTTER performed by Nia Person MD at [facility] CARDIAC CATH LAB    [place] INTRACARDIAC ELECTROPHYSIOLOGIC 3D MAPPING N/A 12/23/2020    Ep 3d Mapping performed by Jeniffer Arcos Trey Thomason MD at [facility] CARDIAC CATH LAB    XR ARTHRO SHOULDER LT       both shoulders      Social History:   Social History     Socioeconomic History    Marital status:      Spouse name: Not on file    Number of children: Not on file    Years of education: Not on file    Highest education level: Not on file   Occupational History    Not on file   Tobacco Use    Smoking status: Former     Packs/day: 0.75     Years: 9.00     Pack years: 6.75     Types: Cigarettes     Quit date: 1962     Years since quittin.0    Smokeless tobacco: Never    Tobacco comments:     secondhand smoke exposure for many years from    Substance and Sexual Activity    Alcohol use: Yes     Comment: Very seldom. Drug use: No    Sexual activity: Not on file   Other Topics Concern    Not on file   Social History Narrative    Not on file     Social Determinants of Health     Financial Resource Strain: Not on file   Food Insecurity: Not on file   Transportation Needs: Not on file   Physical Activity: Not on file   Stress: Not on file   Social Connections: Not on file   Intimate Partner Violence: Not on file   Housing Stability: Not on file      Dwelling Status: [facility] memory unit  Current Anticoagulant Medications: Xarelto 15mg with dinner, last dose presumably   History of falls: NO  Prior Fractures: Vertebral  Osteoporosis Medications: calcium with vitamin D 1200mg daily    Labs:    Lab Results   Component Value Date/Time    HGB 13.3 2022 07:55 AM    WBC 9.9 2022 07:55 AM    Albumin 3.4 (L) 2022 07:55 AM     X-RAYS:   XR HIP LT W OR WO PELV 2-3 VWS 2022 07:40  INDICATION: Fall/pain. COMPARISON: None. FINDINGS: AP view of the pelvis and a frogleg lateral view of the left hip  demonstrate mildly displaced fracture of the metaphysis of the proximal left  femur involving the lesser trochanter. IMPRESSION  Fracture proximal left femur.     Physical Exam:   79yo female, alert, complains of severe left hip pain, advanced dementia, oriented to self only, daughter at bedside, moving left foot ankle spontaneously, left leg shortened/externally rotated, pain with any motion of left leg, generalized swelling of left thigh, skin intact, sensation grossly intact throughout upper and lower extremities, 2+ pedal pulses    Assessment: Fracture proximal left femur    Plan: Discussed with attending orthopedic surgeon, Dr. Suly Pinzon. Recommend surgical repair of this fracture. Plan for right hip IM nail today pending medical clearance. Due to patient's dementia, discussed surgery, hospital course, and expected recovery with patient's daughter. All questions were answered. She expressed understanding and would like to proceed with surgery. Case posted with OR. Keep NPO. Pain control. Resume Xarelto post-operatively for DVT prophylaxis.              Signed by: LIZBETH Lowe   Today's Date: December 26, 2022

## 2022-12-26 NOTE — H&P
[facility] [facility] Adult  Hospitalist Group  History and Physical    Date of Service:  12/26/2022  Primary Care Provider: Arleta Meckel., MD  Source of information: The patient and Chart review    Chief Complaint: Hip Injury (fall)      History of Presenting Illness: Domingo Babb is a 80 y.o. female with a pmh of renal cell carcinoma s/p nephrectomy, sleep apnea, OA, RA, dementia from a memory care facility, sleep apnea, and cardiomyopathy, HFrEF. She presented to [place] [facility] with CC of GLF this morning (patient is unsure of what caused the fall), no head injury, but was found to have a proximal left femur fracture and was transferred to [facility] Orthopedic floor for Orthosurgery consult. Assessed patient at bedside, she has limited insight but it complaining of left hip pain. States she is otherwise in good health. Per chart review, no evidence of drug or alcohol use. Patient normally resides in memory care, further information was obtained from daughter [facility]) who is at the bedside. REVIEW OF SYSTEMS:  Review of systems not obtained due to patient factors.      Past Medical History:   Diagnosis Date    Arthritis     Broken leg     Right leg    Cardiomyopathy ([address] 12/23/2020    Dementia ([address] 12/23/2020    Dyspnea     Enlarged heart     Fracture     Hypercholesterolemia     Hyperglycemia     Hypothyroidism     Kyphoscoliosis     Malignant neoplasm (HCC)     of kidney, except pelvis    Obstructive sleep apnea     Osteoarthritis     Pain, abdominal     RA (rheumatoid arthritis) ([address]     Renal cell carcinoma (HCC)       Past Surgical History:   Procedure Laterality Date    ENDOSCOPY, COLON, DIAGNOSTIC      HX BREAST LUMPECTOMY      x 4, No cancer found    HX CARPAL TUNNEL RELEASE      Left hand    HX CATARACT REMOVAL      both eyes    HX NEPHRECTOMY  2005    kidney cancer    HX PREMALIG/BENIGN SKIN LESION EXCISION      precancerous growth removed from back; 2 masses removed from hand and arm    HX THYROIDECTOMY      HX THYROIDECTOMY      CA COMPRE ELECTROPHYSIOL XM W/LEFT ATRIAL PACNG/REC N/A 12/23/2020    Lt Atrial Pace & Record During Ep Study performed by Aj Sol MD at [facility] CARDIAC CATH LAB    CA COMPRE EP EVAL ABLTJ 3D MAPG TX SVT N/A 12/23/2020    ABLATION A-FLUTTER performed by Aj Sol MD at [facility] CARDIAC CATH LAB    CA INTRACARDIAC ELECTROPHYSIOLOGIC 3D MAPPING N/A 12/23/2020    Ep 3d Mapping performed by Aj Sol MD at [facility] CARDIAC CATH LAB    XR ARTHRO SHOULDER LT       both shoulders     Prior to Admission medications    Medication Sig Start Date End Date Taking? Authorizing Provider   metoprolol succinate (TOPROL-XL) 25 mg XL tablet Take 0.5 Tabs by mouth daily. 1/12/21   Radha Cheng ANP   rivaroxaban (XARELTO) 15 mg tab tablet Take 1 Tab by mouth daily (with dinner). 12/25/20   Jeffery Milian MD   levothyroxine (SYNTHROID) 112 mcg tablet Take 112 mcg by mouth Daily (before breakfast). Provider, Historical   patiromer calcium sorbitex (Veltassa) 8.4 gram powder Take 8.4 g by mouth daily. Provider, Historical   Calcium-Cholecalciferol, D3, (Calcium 600 with Vitamin D3) 600 mg(1,500mg) -400 unit chew Take 1 Tab by mouth daily. Provider, Historical     Allergies   Allergen Reactions    Ismo [Isosorbide Mononitrate] Not Reported This Time    Penicillins Unknown (comments)    Plaquenil [Hydroxychloroquine] Other (comments)     Lost a lot of her skin. Really bad. Prednisone Itching     Very itchy. Family History   Problem Relation Age of Onset    Colon Cancer Mother     Diabetes Father     Pacemaker Brother     Heart Attack Brother     Diabetes Brother     Heart Disease Brother       Social History:  reports that she quit smoking about 61 years ago. She has a 6.75 pack-year smoking history. She has never used smokeless tobacco. She reports current alcohol use. She reports that she does not use drugs. Social Determinants of Health     Tobacco Use: Medium Risk    Smoking Tobacco Use: Former    Smokeless Tobacco Use: Never    Passive Exposure: Not on file   Alcohol Use: Not on file   Financial Resource Strain: Not on file   Food Insecurity: Not on file   Transportation Needs: Not on file   Physical Activity: Not on file   Stress: Not on file   Social Connections: Not on file   Intimate Partner Violence: Not on file   Depression: Not on file   Housing Stability: Not on file        Family and social history were personally reviewed, all pertinent and relevant details are outlined as above. Objective:   Visit Vitals  /66   Pulse 74   Temp 98 °F (36.7 °C)   Resp 21   Ht 5' 5\" (1.651 m)   Wt 83.4 kg (183 lb 13.8 oz)   SpO2 100%   BMI 30.60 kg/m²    O2 Flow Rate (L/min): 2 l/min O2 Device: Nasal cannula    PHYSICAL EXAM:   General: Alert x oriented x 1, awake, no acute distress, resting in bed, pleasant female, appears to be stated age  [de-identified]: PEERL, moist mucus membranes  Neck: Supple, no JVD  Chest: Clear to auscultation bilaterally   CVS: RRR, S1 S2 heard, no murmurs/rubs/gallops  Abd: Soft, non-tender, non-distended, +bowel sounds   Ext: No clubbing, no cyanosis, no edema  Neuro/Psych: Pleasant mood and affect, sensory grossly within normal limit, Strength 5/5 in all extremities  Cap refill: Brisk, less than 3 seconds  Pulses: 2+, symmetric in all extremities  Skin: Warm, dry, without rashes or lesions  Musc: L leg shorter than left, L femur fracture    Data Review: All diagnostic labs and studies have been reviewed. Abnormal Labs Reviewed   CBC WITH AUTOMATED DIFF - Abnormal; Notable for the following components:       Result Value    NEUTROPHILS 80 (*)     LYMPHOCYTES 10 (*)     IMMATURE GRANULOCYTES 1 (*)     ABS. IMM.  GRANS. 0.1 (*)     All other components within normal limits   METABOLIC PANEL, COMPREHENSIVE - Abnormal; Notable for the following components:    Glucose 109 (*)     Creatinine 1.21 (*)     eGFR 42 (*)     Alk. phosphatase 123 (*)     Albumin 3.4 (*)     Globulin 4.1 (*)     A-G Ratio 0.8 (*)     All other components within normal limits   URINALYSIS W/MICROSCOPIC - Abnormal; Notable for the following components:    Protein TRACE (*)     Leukocyte Esterase TRACE (*)     Mucus TRACE (*)     All other components within normal limits       All Micro Results       Procedure Component Value Units Date/Time    URINE CULTURE HOLD SAMPLE [[id number]] Collected: 12/26/22 0942    Order Status: Completed Specimen: Urine Updated: 12/26/22 0945     Urine culture hold       Urine on hold in Microbiology dept for 2 days. If unpreserved urine is submitted, it cannot be used for addtional testing after 24 hours, recollection will be required. IMAGING:   XR HIP LT W OR WO PELV 2-3 VWS   Final Result   Fracture proximal left femur. XR CHEST SNGL V   Final Result   Prominent size heart. Minimal vascular prominence. No focal   infiltrate. ECG/ECHO:    Results for orders placed or performed during the hospital encounter of 12/26/22   EKG, 12 LEAD, INITIAL   Result Value Ref Range    Ventricular Rate 72 BPM    Atrial Rate 72 BPM    P-R Interval 200 ms    QRS Duration 94 ms    Q-T Interval 382 ms    QTC Calculation (Bezet) 418 ms    Calculated P Axis 87 degrees    Calculated R Axis 52 degrees    Calculated T Axis -65 degrees    Diagnosis       Sinus rhythm with premature atrial complexes with aberrant conduction  Anteroseptal infarct (cited on or before 26-DEC-2022)  Abnormal ECG  When compared with ECG of 22-DEC-2020 12:13,  Sinus rhythm has replaced Atrial flutter  Vent. rate has decreased BY  52 BPM  ST no longer depressed in Inferior leads  Nonspecific T wave abnormality no longer evident in Lateral leads          Assessment:   Given the patient's current clinical presentation, there is a high level of concern for decompensation if discharged from the emergency department.  Complex decision making was performed, which includes reviewing the patient's available past medical records, laboratory results, and imaging studies. Active Problems:    Hip fracture ([address] (12/26/2022)        Plan:     Fracture or left proximal femur:  -GLF this morning  -multimodal pain management- scheduled tylenol, lindsey and morphine  -orthopedic surgery consulted- plan for surgery this evening  -case discussed with orthopedic surgery to plan for surgery this afternoon  -NPO for surgery  -bedrest  -Incentive spirometer, cough and deep breathe  -PT/OT when able  -fall precautions     According to the revised cardiac risk index, class 2 risk. Cardiomyopathy, HFrEF:  -echo 2020, EF 40-45%  -not in CHF exacerbation  -continue metoprolol    CKD stage 3:  -creatinine 1.21, at baseline  -avoid nephrotoxins    Hx of atrial flutter:  -on xarelto, holding for surgery  -discussed with daughter, will likely not restart at discharge    Dementia:  -lives in a memory care unit  -A/O x 1  -continue home aricept, seroquel and trazodone  -reorientation as needed  -high risk for delirium     Hypothyroidism:  -continue synthroid     Arthritis (RA and OA):  -tylenol as needed  -not on home medication    Hx of renal cell carcinoma:  -s/p nephrectomy    Sleep apnea:  -titrate oxygen to maintain saturation above 94%    DIET: DIET NPO   ISOLATION PRECAUTIONS: There are currently no Active Isolations  CODE STATUS: DNR   DVT PROPHYLAXIS: Lovenox  FUNCTIONAL STATUS PRIOR TO HOSPITALIZATION: Ambulatory and capable of self-care but relies on assistive devices (rolling walker/cane). Ambulatory status/function: Ambulates with assistance:  Unable to ambulate   EARLY MOBILITY ASSESSMENT: Recommend a consult to the Mobility Team and Recommend an assessment from physical therapy and/or occupational therapy  ANTICIPATED DISCHARGE: 24-48 hours.   ANTICIPATED DISPOSITION: LTC  EMERGENCY CONTACT/SURROGATE DECISION MAKER:     CRITICAL CARE WAS PERFORMED FOR THIS ENCOUNTER: NO.      Signed By: Sara Woodson NP     December 26, 2022         Please note that this dictation may have been completed with Dragon, the computer voice recognition software. Quite often unanticipated grammatical, syntax, homophones, and other interpretive errors are inadvertently transcribed by the computer software. Please disregard these errors. Please excuse any errors that have escaped final proofreading.

## 2022-12-26 NOTE — ED TRIAGE NOTES
Triage NLWS:1250 fell going to bathroom and injured left hip and left leg staff at [address] [facility] unit reported patient cant put weight on left leg.

## 2022-12-26 NOTE — ED NOTES
TRANSFER - OUT REPORT:    Verbal report given to [address] Lucian Santillan RN(name) on Julio César Garza  being transferred to (unit) for routine progression of care       Report consisted of patients Situation, Background, Assessment and   Recommendations(SBAR). Information from the following report(s) ED Summary was reviewed with the receiving nurse. Lines:   Peripheral IV 12/26/22 Anterior;Distal;Right Forearm (Active)        Opportunity for questions and clarification was provided.       Patient transported with:  SYDNIE normal...

## 2022-12-26 NOTE — PROGRESS NOTES
Physical Therapy    Consult received and chart reviewed. Patient scheduled for repair of hip fx today. We will follow up post op per ortho recommendations.     Kristina Call, PT

## 2022-12-26 NOTE — ED PROVIDER NOTES
History of hyperlipidemia, cardiomyopathy, renal cell cancer, sleep apnea, arthritis (osteo and rheumatoid), dementia, hypothyroidism. She presents via EMS after having a ground-level fall this morning. She is unsure what caused her to fall. She complains of left hip pain only. It is moderate and worse with any movement. She denies head injury, headache, loss of consciousness. No neck or back pain. No chest or abdominal pain.        Past Medical History:   Diagnosis Date    Arthritis     Broken leg     Right leg    Cardiomyopathy ([address] 12/23/2020    Dementia ([address] 12/23/2020    Dyspnea     Enlarged heart     Fracture     Hypercholesterolemia     Hyperglycemia     Hypothyroidism     Kyphoscoliosis     Malignant neoplasm (HCC)     of kidney, except pelvis    Obstructive sleep apnea     Osteoarthritis     Pain, abdominal     RA (rheumatoid arthritis) ([address]     Renal cell carcinoma (HCC)        Past Surgical History:   Procedure Laterality Date    ENDOSCOPY, COLON, DIAGNOSTIC      HX BREAST LUMPECTOMY      x 4, No cancer found    HX CARPAL TUNNEL RELEASE      Left hand    HX CATARACT REMOVAL      both eyes    HX NEPHRECTOMY  2005    kidney cancer    HX PREMALIG/BENIGN SKIN LESION EXCISION      precancerous growth removed from back; 2 masses removed from hand and arm    HX THYROIDECTOMY      HX THYROIDECTOMY      IA COMPRE ELECTROPHYSIOL XM W/LEFT ATRIAL PACNG/REC N/A 12/23/2020    Lt Atrial Pace & Record During Ep Study performed by Ivory Mendez MD at [facility] CARDIAC CATH LAB    IA COMPRE EP EVAL ABLTJ 3D MAPG TX SVT N/A 12/23/2020    ABLATION A-FLUTTER performed by Ivory Mendez MD at [facility] CARDIAC CATH LAB    IA INTRACARDIAC ELECTROPHYSIOLOGIC 3D MAPPING N/A 12/23/2020    Ep 3d Mapping performed by Ivory Mendez MD at [facility] CARDIAC CATH LAB    XR ARTHRO SHOULDER LT       both shoulders         Family History:   Problem Relation Age of Onset    Colon Cancer Mother     Diabetes Father     Pacemaker Brother     Heart Attack Brother     Diabetes Brother     Heart Disease Brother        Social History     Socioeconomic History    Marital status:      Spouse name: Not on file    Number of children: Not on file    Years of education: Not on file    Highest education level: Not on file   Occupational History    Not on file   Tobacco Use    Smoking status: Former     Packs/day: 0.75     Years: 9.00     Pack years: 6.75     Types: Cigarettes     Quit date: 1962     Years since quittin.0    Smokeless tobacco: Never    Tobacco comments:     secondhand smoke exposure for many years from    Substance and Sexual Activity    Alcohol use: Yes     Comment: Very seldom. Drug use: No    Sexual activity: Not on file   Other Topics Concern    Not on file   Social History Narrative    Not on file     Social Determinants of Health     Financial Resource Strain: Not on file   Food Insecurity: Not on file   Transportation Needs: Not on file   Physical Activity: Not on file   Stress: Not on file   Social Connections: Not on file   Intimate Partner Violence: Not on file   Housing Stability: Not on file         ALLERGIES: Ismo [isosorbide mononitrate], Penicillins, Plaquenil [hydroxychloroquine], and Prednisone    Review of Systems   All other systems reviewed and are negative. Vitals:    22 0712   BP: (!) 155/86   Pulse: 64   Resp: 16   Temp: 97.6 °F (36.4 °C)   SpO2: 91%   Weight: 83.4 kg (183 lb 13.8 oz)   Height: 5' 5\" (1.651 m)            Physical Exam  Vitals and nursing note reviewed. Constitutional:       Appearance: She is well-developed. HENT:      Head: Normocephalic and atraumatic. Eyes:      Conjunctiva/sclera: Conjunctivae normal.   Neck:      Trachea: No tracheal deviation. Cardiovascular:      Rate and Rhythm: Normal rate and regular rhythm. Heart sounds: Normal heart sounds. No murmur heard. No friction rub. No gallop.       Comments: 2+ pedal pulses  Pulmonary: Effort: Pulmonary effort is normal.      Breath sounds: Normal breath sounds. Abdominal:      Palpations: Abdomen is soft. Tenderness: There is no abdominal tenderness. Musculoskeletal:         General: No deformity. Cervical back: Neck supple. Comments: Left hip tenderness. Skin:     General: Skin is warm and dry. Neurological:      Mental Status: She is alert. Comments: She appears to have mild dementia. MDM         Procedures    EKG: Normal sinus rhythm; rate of 72; PACs; anteroseptal Q waves; nonspecific ST, T wave abnormalities. Faiza Yousif MD  7:50 AM    [facility] Consult for Admission  8:03 AM    ED Room Number: SER06/06  Patient Name and age: David Vick 80 y.o.  female  Working Diagnosis: Left proximal femur fracture    COVID-19 Suspicion:  no  Sepsis present:  no  Reassessment needed: Other  Code Status:  Full Code  Readmission: no  Isolation Requirements:  no  Recommended Level of Care:  med/surg  Department:[place] [facility] - [phone]  Other: Ground-level fall this morning. Left hip pain only. X-rays show a proximal left femur fracture involving the lesser trochanter. Neurovascular intact. Reaching out to Ortho as well. Consult note: I reached out to the hospitalist service via [employer] for admission. Faiza Yousif MD  8:07 AM    Consult note: I reached out to Dr. Rober Germain (Ortho) via [facility] for admission.   Faiza Yousif MD  8:08 AM

## 2022-12-27 LAB
GLUCOSE BLD STRIP.AUTO-MCNC: 130 MG/DL (ref 65–117)
GLUCOSE BLD STRIP.AUTO-MCNC: 197 MG/DL (ref 65–117)
SERVICE CMNT-IMP: ABNORMAL
SERVICE CMNT-IMP: ABNORMAL

## 2022-12-27 PROCEDURE — 77010033678 HC OXYGEN DAILY

## 2022-12-27 PROCEDURE — 82962 GLUCOSE BLOOD TEST: CPT

## 2022-12-27 PROCEDURE — 97161 PT EVAL LOW COMPLEX 20 MIN: CPT

## 2022-12-27 PROCEDURE — 51798 US URINE CAPACITY MEASURE: CPT

## 2022-12-27 PROCEDURE — 97535 SELF CARE MNGMENT TRAINING: CPT

## 2022-12-27 PROCEDURE — 74011250636 HC RX REV CODE- 250/636

## 2022-12-27 PROCEDURE — 74011250637 HC RX REV CODE- 250/637

## 2022-12-27 PROCEDURE — 97530 THERAPEUTIC ACTIVITIES: CPT

## 2022-12-27 PROCEDURE — 65270000029 HC RM PRIVATE

## 2022-12-27 PROCEDURE — 74011000250 HC RX REV CODE- 250: Performed by: EMERGENCY MEDICINE

## 2022-12-27 PROCEDURE — 74011000250 HC RX REV CODE- 250

## 2022-12-27 PROCEDURE — 97166 OT EVAL MOD COMPLEX 45 MIN: CPT

## 2022-12-27 RX ORDER — ENOXAPARIN SODIUM 100 MG/ML
40 INJECTION SUBCUTANEOUS DAILY
Status: DISCONTINUED | OUTPATIENT
Start: 2022-12-28 | End: 2022-12-28

## 2022-12-27 RX ORDER — DOCUSATE SODIUM 100 MG/1
100 CAPSULE, LIQUID FILLED ORAL
COMMUNITY

## 2022-12-27 RX ORDER — QUETIAPINE FUMARATE 25 MG/1
25 TABLET, FILM COATED ORAL
COMMUNITY

## 2022-12-27 RX ORDER — CLONAZEPAM 0.5 MG/1
0.5 TABLET ORAL
COMMUNITY

## 2022-12-27 RX ORDER — TRAZODONE HYDROCHLORIDE 50 MG/1
50 TABLET ORAL
COMMUNITY

## 2022-12-27 RX ORDER — DONEPEZIL HYDROCHLORIDE 10 MG/1
10 TABLET, FILM COATED ORAL
COMMUNITY

## 2022-12-27 RX ORDER — ACETAMINOPHEN 500 MG
500 TABLET ORAL
COMMUNITY

## 2022-12-27 RX ORDER — PHENOBARBITAL 15 MG/1
15 TABLET ORAL
COMMUNITY

## 2022-12-27 RX ORDER — LANOLIN ALCOHOL/MO/W.PET/CERES
1000 CREAM (GRAM) TOPICAL
COMMUNITY

## 2022-12-27 RX ADMIN — SODIUM CHLORIDE, PRESERVATIVE FREE 10 ML: 5 INJECTION INTRAVENOUS at 05:41

## 2022-12-27 RX ADMIN — OXYCODONE 5 MG: 5 TABLET ORAL at 15:45

## 2022-12-27 RX ADMIN — ACETAMINOPHEN 650 MG: 325 TABLET ORAL at 05:41

## 2022-12-27 RX ADMIN — ACETAMINOPHEN 650 MG: 325 TABLET ORAL at 18:05

## 2022-12-27 RX ADMIN — LEVOTHYROXINE SODIUM 112 MCG: 0.11 TABLET ORAL at 09:34

## 2022-12-27 RX ADMIN — ACETAMINOPHEN 650 MG: 325 TABLET ORAL at 11:12

## 2022-12-27 RX ADMIN — ENOXAPARIN SODIUM 30 MG: 100 INJECTION SUBCUTANEOUS at 11:12

## 2022-12-27 RX ADMIN — SODIUM CHLORIDE, PRESERVATIVE FREE 10 ML: 5 INJECTION INTRAVENOUS at 14:00

## 2022-12-27 RX ADMIN — OXYCODONE 5 MG: 5 TABLET ORAL at 21:40

## 2022-12-27 RX ADMIN — QUETIAPINE FUMARATE 12.5 MG: 25 TABLET ORAL at 21:41

## 2022-12-27 RX ADMIN — DONEPEZIL HYDROCHLORIDE 10 MG: 10 TABLET, FILM COATED ORAL at 21:40

## 2022-12-27 NOTE — PROGRESS NOTES
[facility] Adult  Hospitalist Group                                                                                          Hospitalist Progress Note  [place], [place]  Answering service: [phone] OR [phone] from in house phone        Date of Service:  2022  NAME:  Kevin Montgomery  :  1930  MRN:  [id number]      Admission Summary:   Kevin Montgomery is a 80 y.o. female with a pmh of renal cell carcinoma s/p nephrectomy, sleep apnea, OA, RA, dementia from a memory care facility, sleep apnea, and cardiomyopathy, HFrEF. She presented to [place] [facility] Department with CC of GLF this morning (patient is unsure of what caused the fall), no head injury, but was found to have a proximal left femur fracture and was transferred to [facility] Orthopedic floor for Orthosurgery consult. Interval history / Subjective:   Saw the patient on rounds this morning. She is complaining of some pain at the operative site on her hip. She is AAOx1 with baseline dementia and is a poor historian. Bedside RN reports the pt is refusing lab draws. Addendum 2: note addenum to reflect conversation with pharmacy RE: stopping metoprolol     Assessment & Plan:     Fracture or left proximal femur:  - GLF on   - multimodal pain management- scheduled tylenol, lindsey and morphine  - Ortho surgery following. Pt is POD#1 L hip IM nail  - Encourage incentive spirometer   - PT/OT to evaluate the patient  - Fall precautions      According to the revised cardiac risk index, class 2 risk. Cardiomyopathy, HFrEF:  - Echo , EF 40-45%  - Spoke with pharmacy, per Yaneli Traore pt has not been taking metoprolol and has no recent fill history. Will discontinue metoprolol.  Grateful for pharmacy assistance     CKD stage 3:  - Creatinine 1.21, at baseline  - Avoid nephrotoxins  - Daily BMP     Hx of atrial flutter:  - On xarelto at home  - Per H&P note \"discussed with daughter, will likely not restart at discharge\"  - Agree with NP Reed Hansen about discontinuing xarelto given recent fall and risk for bleed, will try and call daughter to discuss today (12/27)     Dementia:  - Lives in a memory care unit  - A/O x 1 at baseline  - Continue home aricept, seroquel and trazodone  - High risk for delirium, encourage sleep hygiene, can consider PRNs if pt becomes agitated     Hypothyroidism:  - Continue synthroid      Arthritis (RA and OA):  - Tylenol as needed     Hx of renal cell carcinoma:  - s/p nephrectomy     Sleep apnea:  - Titrate oxygen to maintain saturation above 94%     Code status: DNR   Prophylaxis: Lovenox  Care Plan discussed with: RNSULEMAN  Anticipated Disposition: Likely back to [facility], needs PT/OT evaluation     Hospital Problems  Date Reviewed: 1/12/2021            Codes Class Noted POA    Hip fracture [facility]) ICD-10-CM: F70.227Q  ICD-9-CM: 820.8  12/26/2022 Unknown             Review of Systems:   A comprehensive review of systems was negative except for that written in the HPI. Vital Signs:    Last 24hrs VS reviewed since prior progress note. Most recent are:  Visit Vitals  BP (!) 120/58   Pulse 97   Temp 98.4 °F (36.9 °C)   Resp 20   Ht 5' 5\" (1.651 m)   Wt 83.4 kg (183 lb 13.8 oz)   SpO2 97%   BMI 30.60 kg/m²         Intake/Output Summary (Last 24 hours) at 12/27/2022 0912  Last data filed at 12/26/2022 1836  Gross per 24 hour   Intake 770 ml   Output 100 ml   Net 670 ml        Physical Examination:     I had a face to face encounter with this patient and independently examined them on 12/27/2022 as outlined below:          Constitutional:  No acute distress, cooperative, pleasant    ENT:  Oral mucosa moist, oropharynx benign. Resp:  CTA bilaterally. No wheezing/rhonchi/rales. No accessory muscle use. CV:  Regular rhythm, normal rate, no murmurs, gallops, rubs    GI:  Soft, non distended, non tender.  normoactive bowel sounds, no hepatosplenomegaly Musculoskeletal:  L hip with dressing in place C/D/I, no LE edema 2+ pulses throughout    Neurologic:  Moves all extremities. AAOx1, baseline dementia, CN II-XII grossly intact            Data Review:    Review and/or order of clinical lab test  Review and/or order of tests in the radiology section of CPT  Review and/or order of tests in the medicine section of CPT      Labs:     Recent Labs     12/26/22  0755   WBC 9.9   HGB 13.3   HCT 41.2        Recent Labs     12/26/22  0755      K 4.7      CO2 28   BUN 15   CREA 1.21*   *   CA 9.5     Recent Labs     12/26/22  0755   ALT 16   *   TBILI 0.5   TP 7.5   ALB 3.4*   GLOB 4.1*     No results for input(s): INR, PTP, APTT, INREXT in the last 72 hours. No results for input(s): FE, TIBC, PSAT, FERR in the last 72 hours. No results found for: FOL, RBCF   No results for input(s): PH, PCO2, PO2 in the last 72 hours. No results for input(s): CPK, CKNDX, TROIQ in the last 72 hours.     No lab exists for component: CPKMB  Lab Results   Component Value Date/Time    Cholesterol, total 194 10/05/2016 01:42 AM    HDL Cholesterol 48 10/05/2016 01:42 AM    LDL, calculated 117.2 (H) 10/05/2016 01:42 AM    Triglyceride 144 10/05/2016 01:42 AM    CHOL/HDL Ratio 4.0 10/05/2016 01:42 AM     No results found for: [facility]  Lab Results   Component Value Date/Time    Color YELLOW/STRAW 12/26/2022 09:42 AM    Appearance CLEAR 12/26/2022 09:42 AM    Specific gravity 1.025 12/26/2022 09:42 AM    Specific gravity 1.010 12/22/2020 10:27 PM    pH (UA) 6.0 12/26/2022 09:42 AM    Protein TRACE (A) 12/26/2022 09:42 AM    Glucose Negative 12/26/2022 09:42 AM    Ketone Negative 12/26/2022 09:42 AM    Bilirubin Negative 12/26/2022 09:42 AM    Urobilinogen 0.2 12/26/2022 09:42 AM    Nitrites Negative 12/26/2022 09:42 AM    Leukocyte Esterase TRACE (A) 12/26/2022 09:42 AM    Epithelial cells FEW 12/26/2022 09:42 AM    Bacteria Negative 12/26/2022 09:42 AM    WBC 5-10 12/26/2022 09:42 AM    RBC 0-5 12/26/2022 09:42 AM         Medications Reviewed:     Current Facility-Administered Medications   Medication Dose Route Frequency    sodium chloride (NS) flush 5-40 mL  5-40 mL IntraVENous Q8H    sodium chloride (NS) flush 5-40 mL  5-40 mL IntraVENous PRN    sodium chloride (NS) flush 5-40 mL  5-40 mL IntraVENous Q8H    sodium chloride (NS) flush 5-40 mL  5-40 mL IntraVENous PRN    polyethylene glycol (MIRALAX) packet 17 g  17 g Oral DAILY PRN    ondansetron (ZOFRAN ODT) tablet 4 mg  4 mg Oral Q8H PRN    Or    ondansetron (ZOFRAN) injection 4 mg  4 mg IntraVENous Q6H PRN    enoxaparin (LOVENOX) injection 30 mg  30 mg SubCUTAneous DAILY    levothyroxine (SYNTHROID) tablet 112 mcg  112 mcg Oral ACB    metoprolol succinate (TOPROL-XL) XL tablet 12.5 mg  12.5 mg Oral DAILY    acetaminophen (TYLENOL) tablet 650 mg  650 mg Oral Q6H    oxyCODONE IR (ROXICODONE) tablet 5 mg  5 mg Oral Q4H PRN    morphine injection 2 mg  2 mg IntraVENous Q4H PRN    donepeziL (ARICEPT) tablet 10 mg  10 mg Oral QHS    QUEtiapine (SEROquel) tablet 12.5 mg  12.5 mg Oral QHS    traZODone (DESYREL) tablet 50 mg  50 mg Oral QHS PRN     ______________________________________________________________________  EXPECTED LENGTH OF STAY: - - -  ACTUAL LENGTH OF STAY:          1                 Liane Salt Milligram, PA-C

## 2022-12-27 NOTE — PROGRESS NOTES
NICO- Return to the [facility] w/ [facility] (OUTPATIENT CAMPUS)    Reason for Admission:  femur fx                     RUR Score:     11%                Plan for utilizing home health: [facility] (OUTPATIENT CAMPUS)    PCP: First and Last name:  Edyta Schumacher MD     Name of Practice:    Are you a current patient: Yes/No:    Approximate date of last visit:    Can you participate in a virtual visit with your PCP:                     Current Advanced Directive/Advance Care Plan: DNR      Healthcare Decision Maker:   Click here to complete [address] including selection of the Healthcare Decision Maker Relationship (ie \"Primary\")                             Transition of Care Plan:       Cm called pt's daughter, Jose Dela Cruz ([phone]) to complete initial assessment. This pt lives in [address] [facility] at the [facility] and has been there about 3 1/2 years. Per the daughter, this pt uses a walker for ambulation at baseline. She is able to do most of her ADL's without assistance, but does need assistance with bathing. The daughter would like for this pt to return to the [facility] at d/c. She also said that this pt was receiving therapy by [facility] (OUTPATIENT CAMPUS) at [facility]. CM called the [facility] ([phone]) and was unable to speak with a staff member, so CM left them a voice mail message. JAMES Pitts, [facility]-    Care Management Interventions  PCP Verified by CM:  Yes  Palliative Care Criteria Met (RRAT>21 & CHF Dx)?: No  Transition of Care Consult (CM Consult): Discharge Planning  MyChart Signup: No  Discharge Durable Medical Equipment: No  Physical Therapy Consult: Yes  Occupational Therapy Consult: Yes  Speech Therapy Consult: No  Support Systems: Child(vivian)  Confirm Follow Up Transport: Family  The Plan for Transition of Care is Related to the Following Treatment Goals : safe d/c  The Patient and/or Patient Representative was Provided with a Choice of Provider and Agrees with the Discharge Plan?: Yes  Freedom of Choice List was Provided with Basic Dialogue that Supports the Patient's Individualized Plan of Care/Goals, Treatment Preferences and Shares the Quality Data Associated with the Providers?: No  The [employer] Information Provided?: No

## 2022-12-27 NOTE — PROGRESS NOTES
NICO- D/c back to the [facility] w/ [facility]. CM received a call from Manda Wheeler ([phone]),  at the [facility]. He asked that clinicals be faxed to [phone] after therapy has worked with this pt. He also confirmed that this pt is open to [facility] for therapy at the facility. Will follow.  Kandis Myrick

## 2022-12-27 NOTE — PROGRESS NOTES
Ortho Daily Progress Note      Patient: Julio César Garza                   MRN: [id number]  Sex: female  YOB: 1930           Age: 80 y.o.    1 Day Post-Op    Procedure(s): SYNTHES TFN NAIL    Subjective: \"Nobody will help me! \" Very confused/disoriented, denies pain     Visit Vitals  BP (!) 92/50 (BP 1 Location: Right upper arm, BP Patient Position: At rest)   Pulse 69   Temp 97.8 °F (36.6 °C)   Resp 20   Ht 5' 5\" (1.651 m)   Wt 83.4 kg (183 lb 13.8 oz)   SpO2 94%   BMI 30.60 kg/m²        Lab Results:  HGB   Date/Time Value Ref Range Status   12/26/2022 07:55 AM 13.3 11.5 - 16.0 g/dL Final       Physical Exam:    GENERAL: 79yo female, awake, confused, disoriented  DRESSING:  Aquacel dressings left lateral hip c/d/i  SWELLING: mild  NEUROLOGICAL: not compliant with exam, sensation appears grossly intact  PULSE:yes   WOUND: dressing not removed to examine incision  MOTION: no pain with gentle ROM left hip  DVT Exam: no evidence of DVT seen on physical exam.      Plan:    Leave Aquacel dressings in place x 1 week, may replace with sterile gauze dressing only if Aquacel becomes saturated  DVT prophylaxis: defer to primary team, Lovenox 30mg daily currently, ok to resume Xarelto from ortho standpoint  Weight bearing restriction WBAT  Pain Control:stable, mild-to-moderate joint symptoms intermittently, reasonably well controlled by current meds  Dispo: Plan to return to memory care at [facility] when medically stable  Follow-up with Dr. Shawn Real in 3-4 weeks    LIZBETH Bermudez  12/27/2022   10:57 AM      .

## 2022-12-27 NOTE — PROGRESS NOTES
TRANSFER - OUT REPORT:    Verbal report given to Dawn on [employer]  being transferred to [facility](unit) for routine progression of care       Report consisted of patients Situation, Background, Assessment and   Recommendations(SBAR). Time Pre op antibiotic given:1743  Anesthesia Stop time: 1852    Information from the following report(s) SBAR, Kardex, ED Summary, OR Summary, Procedure Summary, Intake/Output, MAR, and Cardiac Rhythm sr  was reviewed with the receiving nurse. Opportunity for questions and clarification was provided. Is the patient on 02? YES       L/Min 2       Other     Is the patient on a monitor? NO    Is the nurse transporting with the patient? YES    Surgical Waiting Area notified of patient's transfer from PACU? NO      The following personal items collected during your admission accompanied patient upon transfer:   Dental Appliance: Dental Appliances: None  Vision: Visual Aid: None  Hearing Aid:    Jewelry: Jewelry: None  Clothing: Clothing: Bathrobe, Shirt, At bedside  Other Valuables:  Other Valuables: None  Valuables sent to safe:      BAG OF CLOTHES WITH THE PATIENT

## 2022-12-27 NOTE — PROGRESS NOTES
Lovenox Monitoring  Indication: DVT Prophylaxis  Recent Labs     12/26/22  0755   HGB 13.3      CREA 1.21*     Current Weight: 83.4 kg  Est. CrCl = 31.7 ml/min  Current Dose: 30 mg subcutaneously every 24 hours.   Plan: Change to 40mg subcutaneously every 24 hours

## 2022-12-27 NOTE — PROGRESS NOTES
Problem: Mobility Impaired (Adult and Pediatric)  Goal: *Acute Goals and Plan of Care (Insert Text)  Description: FUNCTIONAL STATUS PRIOR TO ADMISSION: Patient required stand-by assistance for basic and instrumental ADLs and was able to mobilize with walker. HOME SUPPORT PRIOR TO ADMISSION: The patient lived at [facility]. Per family, patient will be able to get full assistance 24/7 care at memory unit and therapy sourced in. Physical Therapy Goals  Initiated 12/27/2022    1. Patient will move from supine to sit and sit to supine , scoot up and down, and roll side to side in bed with minimal assistance/contact guard assist within 4 days. 2. Patient will perform sit to stand with minimal assistance/contact guard assist within 4 days. 3. Patient will ambulate with minimal assistance/contact guard assist for 10 feet with the least restrictive device within 4 days. 4. Patient will perform L hip home exercise program per protocol with minimal assistance/contact guard assist within 4 days. Outcome: Progressing Towards Goal     PHYSICAL THERAPY EVALUATION  Patient: Domingo Babb (19 y.o. female)  Date: 12/27/2022  Primary Diagnosis: Hip fracture (HCC) [S72.009A]  Procedure(s) (LRB):  SYNTHES TFN NAIL (Left) 1 Day Post-Op   Precautions: falls         ASSESSMENT  Based on the objective data described below, the patient presents with impairment in functional mobility, activity tolerance, ROM, strength and balance s/p fall with hip fracture, syntheses TFN nail and IM nail on 12/16/2022 performed by Dr. Shailesh Silverio and is WBAT on left lower extremity POD #1. PLOF: independent with ADLs and IADLs. Patient lives in [facility] on a memory care unit, with RW and supervision 24/7 per family. Patient overall max A x2 for bed mobility and unsafe for transfers to standing at this time.  Pt requires initially mod-max A for sitting EOB balance, progresses to SBA with time and cues as patient becomes comfortable with sitting with L hip. Pt returned to supine with max A and total A to boost. Recommend HH at [facility] and ordering of WC prior to discharge. Current Level of Function Impacting Discharge (mobility/balance): complains of high pain levels, intermittent performance    Functional Outcome Measure: The patient scored Total: 25/100 on the Barthel Index outcome measure which is indicative of being high dysfuction in basic self-care. Other factors to consider for discharge:      Patient will benefit from skilled therapy intervention to address the above noted impairments. PLAN :  Recommendations and Planned Interventions: bed mobility training, transfer training, gait training, therapeutic exercises, neuromuscular re-education, patient and family training/education, and therapeutic activities      Frequency/Duration: Patient will be followed by physical therapy:  twice daily for two days, then daily to address goals. Recommendation for discharge: (in order for the patient to meet his/her long term goals)  Physical therapy at least 3 days/week in the [facility]     This discharge recommendation:  Has been made in collaboration with the attending provider and/or case management    IF patient discharges home will need the following DME: to be determined (TBD), will likely require WC         SUBJECTIVE:   Patient stated I have pain in my leg!     OBJECTIVE DATA SUMMARY:   HISTORY:    Past Medical History:   Diagnosis Date    Arthritis     Broken leg     Right leg    Cardiomyopathy ([address] 12/23/2020    Dementia ([address] 12/23/2020    Dyspnea     Enlarged heart     Fracture     Hypercholesterolemia     Hyperglycemia     Hypothyroidism     Kyphoscoliosis     Malignant neoplasm (HCC)     of kidney, except pelvis    Obstructive sleep apnea     Osteoarthritis     Pain, abdominal     RA (rheumatoid arthritis) ([address]     Renal cell carcinoma (HCC)      Past Surgical History:   Procedure Laterality Date    ENDOSCOPY, COLON, DIAGNOSTIC      HX BREAST LUMPECTOMY      x 4, No cancer found    HX CARPAL TUNNEL RELEASE      Left hand    HX CATARACT REMOVAL      both eyes    HX NEPHRECTOMY  2005    kidney cancer    HX PREMALIG/BENIGN SKIN LESION EXCISION      precancerous growth removed from back; 2 masses removed from hand and arm    HX THYROIDECTOMY      HX THYROIDECTOMY      VT COMPRE ELECTROPHYSIOL XM W/LEFT ATRIAL PACNG/REC N/A 12/23/2020    Lt Atrial Pace & Record During Ep Study performed by Carito Eisenberg MD at [facility] CARDIAC CATH LAB    VT COMPRE EP EVAL ABLTJ 3D MAPG TX SVT N/A 12/23/2020    ABLATION A-FLUTTER performed by Carito Eisenberg MD at [facility] CARDIAC CATH LAB    VT INTRACARDIAC ELECTROPHYSIOLOGIC 3D MAPPING N/A 12/23/2020    Ep 3d Mapping performed by Carito Eisenberg MD at [facility] CARDIAC CATH LAB    XR ARTHRO SHOULDER LT       both shoulders       Personal factors and/or comorbidities impacting plan of care:     Home Situation  Home Environment: [address] [place] [address] Name: [facility]  One/Two Story Residence: One story  Living Alone: No  Support Systems: Child(vivian), Assisted Living  Patient Expects to be Discharged to[de-identified] Assisted Living  Current DME Used/Available at Home: Walker, rolling  Tub or Shower Type: Shower    EXAMINATION/PRESENTATION/DECISION MAKING:   Critical Behavior:  Neurologic State: Confused  Orientation Level: Disoriented to place, Disoriented to situation, Disoriented to time, Oriented to person  Cognition: Decreased attention/concentration, Impaired decision making  Safety/Judgement: Decreased insight into deficits, Decreased awareness of need for safety, Decreased awareness of need for assistance  Hearing:   Auditory  Auditory Impairment: Hard of hearing, bilateral, Hearing aid(s)  Hearing Aids/Status: Functioning  Skin:  intact  Edema: none  Range Of Motion:  AROM: Generally decreased, functional                       Strength:    Strength: Generally decreased, functional                    Tone & Sensation:   Tone: Normal                              Coordination:     Vision:      Functional Mobility:  Bed Mobility:  Rolling: Maximum assistance     Sit to Supine: Maximum assistance;Assist x2  Scooting: Total assistance;Assist x2 (to [facility])  Transfers:  Sit to Stand:  (not tested)                          Balance:   Sitting: Impaired; With support  Sitting - Static: Fair (occasional) (initially poor but progressing with time spent EOB)  Sitting - Dynamic: Poor (constant support)  Ambulation/Gait Training:        Functional Measure:  Barthel Index:    Bathin  Bladder: 5  Bowels: 5  Groomin  Dressin  Feedin  Mobility: 0  Stairs: 0  Toilet Use: 0  Transfer (Bed to Chair and Back): 5  Total: 25/100       The Barthel ADL Index: Guidelines  1. The index should be used as a record of what a patient does, not as a record of what a patient could do. 2. The main aim is to establish degree of independence from any help, physical or verbal, however minor and for whatever reason. 3. The need for supervision renders the patient not independent. 4. A patient's performance should be established using the best available evidence. Asking the patient, friends/relatives and nurses are the usual sources, but direct observation and common sense are also important. However direct testing is not needed. 5. Usually the patient's performance over the preceding 24-48 hours is important, but occasionally longer periods will be relevant. 6. Middle categories imply that the patient supplies over 50 per cent of the effort. 7. Use of aids to be independent is allowed. Score Interpretation (from [address] [address])    Independent   60-79 Minimally independent   40-59 Partially dependent   20-39 Very dependent   <20 Totally dependent     -Mayuri Leary, Barthel, D.W. (1965). Functional evaluation: the Barthel Index. [facility] ([address] (1997).  The Barthel activities of daily living index: self-reporting versus actual performance in the old (> or = 75 years). Journal of [address] [place] [address] 45(1), [facility], [place], Kelly Daniel, Yuliya Acevedo. (1999). Measuring the change in disability after inpatient rehabilitation; comparison of the responsiveness of the Barthel Index and Functional [place] Measure. Journal of Neurology, Neurosurgery, and Psychiatry, 66(4), 302-165. RAJANI Dior, JOSE Coe, & Vilma Santana M.A. (2004) Assessment of post-stroke quality of life in cost-effectiveness studies: The usefulness of the Barthel Index and the EuroQoL-5D. Quality of Life Research, 15, 711-89        Physical Therapy Evaluation Charge Determination   History Examination Presentation Decision-Making   HIGH Complexity :3+ comorbidities / personal factors will impact the outcome/ POC  HIGH Complexity : 4+ Standardized tests and measures addressing body structure, function, activity limitation and / or participation in recreation  LOW Complexity : Stable, uncomplicated  Other outcome measures barthel  HIGH       Based on the above components, the patient evaluation is determined to be of the following complexity level: LOW     Pain Rating:  Complains of consistent pain LLE    Activity Tolerance:   Fair and requires rest breaks    After treatment patient left in no apparent distress:   Supine in bed, Call bell within reach, bed alarm on, and Caregiver / family present    COMMUNICATION/EDUCATION:   The patients plan of care was discussed with: Registered nurse and Case management. Fall prevention education was provided and the patient/caregiver indicated understanding. and Patient/family have participated as able in goal setting and plan of care.     Thank you for this referral.  Freddy Mancini, PT   Time Calculation: 35 mins

## 2022-12-27 NOTE — PROGRESS NOTES
Problem: Mobility Impaired (Adult and Pediatric)  Goal: *Acute Goals and Plan of Care (Insert Text)  Description: FUNCTIONAL STATUS PRIOR TO ADMISSION: Patient required stand-by assistance for basic and instrumental ADLs and was able to mobilize with walker. HOME SUPPORT PRIOR TO ADMISSION: The patient lived at [facility]. Per family, patient will be able to get full assistance 24/7 care at [facility] unit and therapy sourced in. Physical Therapy Goals  Initiated 12/27/2022    1. Patient will move from supine to sit and sit to supine , scoot up and down, and roll side to side in bed with minimal assistance/contact guard assist within 4 days. 2. Patient will perform sit to stand with minimal assistance/contact guard assist within 4 days. 3. Patient will ambulate with minimal assistance/contact guard assist for 10 feet with the least restrictive device within 4 days. 4. Patient will perform L hip home exercise program per protocol with minimal assistance/contact guard assist within 4 days. 12/27/2022 1641 by Darion Celaya, PT  Outcome: Progressing Towards Goal  12/27/2022 1546 by Darion Celaya, PT  Outcome: Progressing Towards Goal    PHYSICAL THERAPY TREATMENT  Patient: Violette Benjamin (78 y.o. female)  Date: 12/27/2022  Diagnosis: Hip fracture ([address] [place] <principal problem not specified>  Procedure(s) (LRB):  SYNTHES TFN NAIL (Left) 1 Day Post-Op  Precautions:    Chart, physical therapy assessment, plan of care and goals were reviewed. ASSESSMENT  Patient continues with skilled PT services and is progressing towards goals. Pt tolerates supine to sit with max A x2 and intial assist for balance sitting, EOB, able to participate minimally in scooting forward to place feet on the floor, which is improved over this AM. Pt tolerates with worse pain not relenting at EOB. Pt requests return to supine, will continue to follow and progress as able.  Pt left with paid CG who stays with her a few hours per day. Current Level of Function Impacting Discharge (mobility/balance): complains of high pain    Other factors to consider for discharge: max A         PLAN :  Patient continues to benefit from skilled intervention to address the above impairments. Continue treatment per established plan of care. to address goals. Recommendation for discharge: (in order for the patient to meet his/her long term goals)  Physical therapy at least 2 days/week in the residential     This discharge recommendation:  Has been made in collaboration with the attending provider and/or case management    IF patient discharges home will need the following DME: wheelchair       SUBJECTIVE:   Patient stated I am hurting!     OBJECTIVE DATA SUMMARY:   Critical Behavior:  Neurologic State: Confused  Orientation Level: Disoriented X4  Cognition: Decreased attention/concentration, Impaired decision making  Safety/Judgement: Decreased insight into deficits, Decreased awareness of need for safety, Decreased awareness of need for assistance  Functional Mobility Training:  Bed Mobility:  Rolling: Maximum assistance     Sit to Supine: Maximum assistance;Assist x2  Scooting: Total assistance;Assist x2        Transfers:  Sit to Stand:  (not tested)                                Balance:  Sitting: Impaired; With support  Sitting - Static: Fair (occasional)  Sitting - Dynamic: Fair (occasional)  Ambulation/Gait Training:             Pain Rating:  Complains of high pain consistently, had received all meds available    Activity Tolerance:   Good, Fair, and requires rest breaks    After treatment patient left in no apparent distress:   Supine in bed, Call bell within reach, and Caregiver / family present    COMMUNICATION/COLLABORATION:   The patients plan of care was discussed with: Registered nurse and Case management.      Swathi Mancini, PT   Time Calculation: 24 mins

## 2022-12-27 NOTE — PROGRESS NOTES
Bedside and Verbal shift change report given to Albert Lott RN (oncoming nurse) by Lexis Castillo LPN (offgoing nurse). Report included the following information SBAR, Kardex, ED Summary, Procedure Summary, Intake/Output, MAR, Accordion, Recent Results, and Med Rec Status. Patient still in [address] [place] [address] / PACU at time of report.

## 2022-12-27 NOTE — PROGRESS NOTES
Admission Medication Reconciliation:    Information obtained from:  med list from [facility]:  YES    Comments/Recommendations: Updated PTA meds/reviewed patient's allergies. 1)  Medication changes (since last review): Added  - trazodone, Vit b12, acetaminophen, clonazepam, docusate sodium, donepezil, phenobarbital, seroquel    Adjusted  - n/a    Removed  - metoprolol, rivaroxaban    3)  Veltassa per facility is qweek on Mon but family stated it is every day. ¹RxQuery pharmacy benefit data reflects medications filled and processed through the patient's insurance, however   this data does NOT capture whether the medication was picked up or is currently being taken by the patient. Allergies:  Ismo [isosorbide mononitrate], Penicillins, Plaquenil [hydroxychloroquine], and Prednisone    Significant PMH/Disease States:   Past Medical History:   Diagnosis Date    Arthritis     Broken leg     Right leg    Cardiomyopathy ([facility] 12/23/2020    Dementia ([facility] 12/23/2020    Dyspnea     Enlarged heart     Fracture     Hypercholesterolemia     Hyperglycemia     Hypothyroidism     Kyphoscoliosis     Malignant neoplasm (HCC)     of kidney, except pelvis    Obstructive sleep apnea     Osteoarthritis     Pain, abdominal     RA (rheumatoid arthritis) ([facility]     Renal cell carcinoma [facility])      Chief Complaint for this Admission:    Chief Complaint   Patient presents with    Hip Injury     fall     Prior to Admission Medications:   Prior to Admission Medications   Prescriptions Last Dose Informant Taking? Calcium-Cholecalciferol, D3, (Calcium 600 with Vitamin D3) 600 mg(1,500mg) -400 unit chew 12/25/2022 Child Yes   Sig: Take 1 Tab by mouth daily. PHENobarbitaL 15 mg tablet   Yes   Sig: Take 15 mg by mouth daily as needed (tremors). Do not use clonazepam and phenobarb on same day   QUEtiapine (SEROqueL) 25 mg tablet   Yes   Sig: Take 25 mg by mouth nightly.    acetaminophen (TYLENOL) 500 mg tablet   Yes   Sig: Take 500 mg by mouth every twelve (12) hours as needed for Pain. clonazePAM (KlonoPIN) 0.5 mg tablet   Yes   Sig: Take 0.5 mg by mouth daily as needed for Anxiety. cyanocobalamin 1,000 mcg tablet 12/26/2022  Yes   Sig: Take 1,000 mcg by mouth every seven (7) days. Q Mon   docusate sodium (COLACE) 100 mg capsule   Yes   Sig: Take 100 mg by mouth two (2) times daily as needed for Constipation. donepeziL (ARICEPT) 10 mg tablet   Yes   Sig: Take 10 mg by mouth nightly. levothyroxine (SYNTHROID) 112 mcg tablet 12/25/2022 Child Yes   Sig: Take 112 mcg by mouth Daily (before breakfast). patiromer calcium sorbitex (Veltassa) 8.4 gram powder 12/25/2022 Child Yes   Sig: Take 8.4 g by mouth daily. traZODone (DESYREL) 50 mg tablet   Yes   Sig: Take 50 mg by mouth nightly. Facility-Administered Medications: None     Please contact the main inpatient pharmacy with any questions or concerns at [phone] and we will direct you to the clinical pharmacist covering this patient's care while in-house.    Nataliia Prieto [facility]

## 2022-12-27 NOTE — PROGRESS NOTES
Problem: Self Care Deficits Care Plan (Adult)  Goal: *Acute Goals and Plan of Care (Insert Text)  Description: FUNCTIONAL STATUS PRIOR TO ADMISSION: Patient was modified independent using a rolling walker for functional mobility. Patient required minimal assistance for basic and instrumental ADLs. Patient able to complete most ADLs independently, did require assist for showers and IADLs. HOME SUPPORT: Patient stayed at [facility] care, assist for showers and IADLs but otherwise mod independent. Occupational Therapy Goals  Initiated 12/27/2022  1. Patient will perform self-feeding with modified independence within 7 day(s). 2.  Patient will perform lower body dressing with maximal assistance within 7 day(s). 3.  Patient will perform grooming in unsupported sitting with supervision/set-up within 7 day(s). 4.  Patient will perform toilet transfers with moderate assistance  within 7 day(s). 5.  Patient will perform all aspects of toileting with moderate assistance  within 7 day(s). 6.  Patient will participate in upper extremity therapeutic exercise/activities with supervision/set-up for 10 minutes within 7 day(s). 7.  Patient will utilize energy conservation techniques during functional activities with verbal and visual cues within 7 day(s). Outcome: Not Met   OCCUPATIONAL THERAPY EVALUATION  Patient: Veronica Pichardo (28 y.o. female)  Date: 12/27/2022  Primary Diagnosis: Hip fracture ([facility] [address] [address] [S72.009A]  Procedure(s) (LRB):  SYNTHES TFN NAIL (Left) 1 Day Post-Op   Precautions: falls       ASSESSMENT  Based on the objective data described below, the patient presents with impaired sitting balance, limited activity tolerance, poor lower extremity access, fair upper extremity strength and range of motion, bilateral upper extremities tremors that daughter reports are worsened from baseline, and requiring increased assist for self care and functional mobility/transfers.  Patient admitted from memory care facility after GLF with left proximal femur fracture. Patient underwent syntheses TFN nail and IM nail on 12/16/2022 performed by Dr. Gustavo Asencio and is WBAT on left lower extremity. Patient received sitting edge of bed with PT present, agreeable to working with therapy and co-treatment. Patient assisted for setup to cut food and manage containers but then able to self feed, noted difficulty with eating broccoli, is currently on easy to chew diet as requires dentures which were not in for lunch. Patient able to tolerate sitting edge of bed >10 minutes, initially with heavy posterior lean but able to correct with cueing and increased time in sitting. After eating patient reporting increased pain so assisted to transfer back into supine and repositioned in bed for comfort. Overall patient did fair with session but is well below reported functional baseline. Patient would benefit from skilled OT services during admission to improve independence with self care and functional mobility/transfers. Recommend discharge to SNF at this time. Discussed with daughter who is hesitant to send patient back to [facility] due to past poor experiences, hopeful patient can discharge back to [facility] with [facility] therapy and increased staff report, will depend on level of assist that facility is able to provide. Will need physical assist for all transfers vs romain lift to wheelchair. Current Level of Function Impacting Discharge (ADLs/self-care): max to total A x2 for mobility/transfers, setup self-feeding, up to total A for lower body self care    Functional Outcome Measure: The patient scored Total: 25/100 on the Barthel Index outcome measure which is indicative of being very dependent in basic self-care. Other factors to consider for discharge: prior level of function, ambulatory with RW, supportive daughter, from [facility]     Patient will benefit from skilled therapy intervention to address the above noted impairments.        PLAN :  Recommendations and Planned Interventions: self care training, functional mobility training, therapeutic exercise, balance training, therapeutic activities, endurance activities, patient education, home safety training, and family training/education    Frequency/Duration: Patient will be followed by occupational therapy 5 times a week to address goals. Recommendation for discharge: (in order for the patient to meet his/her long term goals)  Therapy up to 5 days/week in SNF setting    This discharge recommendation:  Has been made in collaboration with the attending provider and/or case management    IF patient discharges home will need the following DME: wheelchair and possibly mechanical lift pending progress       SUBJECTIVE:   Patient stated My hip hurts.     OBJECTIVE DATA SUMMARY:   HISTORY:   Past Medical History:   Diagnosis Date    Arthritis     Broken leg     Right leg    Cardiomyopathy ([address] 12/23/2020    Dementia ([address] 12/23/2020    Dyspnea     Enlarged heart     Fracture     Hypercholesterolemia     Hyperglycemia     Hypothyroidism     Kyphoscoliosis     Malignant neoplasm (HCC)     of kidney, except pelvis    Obstructive sleep apnea     Osteoarthritis     Pain, abdominal     RA (rheumatoid arthritis) ([address]     Renal cell carcinoma (HCC)      Past Surgical History:   Procedure Laterality Date    ENDOSCOPY, COLON, DIAGNOSTIC      HX BREAST LUMPECTOMY      x 4, No cancer found    HX CARPAL TUNNEL RELEASE      Left hand    HX CATARACT REMOVAL      both eyes    HX NEPHRECTOMY  2005    kidney cancer    HX PREMALIG/BENIGN SKIN LESION EXCISION      precancerous growth removed from back; 2 masses removed from hand and arm    HX THYROIDECTOMY      HX THYROIDECTOMY      MD TOÑO ELECTROPHYSIOL XM W/LEFT ATRIAL PACNG/REC N/A 12/23/2020    Lt Atrial Pace & Record During Ep Study performed by Qing Barba MD at [facility] CARDIAC CATH LAB    MD COMPRE EP EVAL ABLTJ 3D MAPG TX SVT N/A 12/23/2020    ABLATION A-FLUTTER performed by Zonia Randolph MD at [facility] CARDIAC CATH LAB    PA INTRACARDIAC ELECTROPHYSIOLOGIC 3D MAPPING N/A 12/23/2020    Ep 3d Mapping performed by Zonia Randolph MD at [facility] CARDIAC CATH LAB    XR ARTHRO SHOULDER LT       both shoulders       Expanded or extensive additional review of patient history:     Home Situation  Home Environment: [address] [place] [address] Name: Mai Monet  One/Two Story Residence: One story  Living Alone: No  Support Systems: Child(vivian), Assisted Living  Patient Expects to be Discharged to[de-identified] Assisted Living  Current DME Used/Available at Home: [facility] [place], rolling  Tub or Shower Type: Shower    Hand dominance: Right    EXAMINATION OF PERFORMANCE DEFICITS:  Cognitive/Behavioral Status:  Neurologic State: Confused     Cognition: Decreased attention/concentration; Impaired decision making        Safety/Judgement: Decreased insight into deficits; Decreased awareness of need for safety;Decreased awareness of need for assistance    Skin: bandage over incision site    Edema: lower extremity swelling    Hearing: Auditory  Auditory Impairment: Hard of hearing, bilateral, Hearing aid(s)  Hearing Aids/Status: Functioning    Vision/Perceptual:                                     Range of Motion:  AROM: Generally decreased, functional                         Strength:  Strength: Generally decreased, functional                Coordination: Tone & Sensation:  Tone: Normal                         Balance:  Sitting: Impaired; With support  Sitting - Static: Fair (occasional) (initially poor but progressing with time spent EOB)  Sitting - Dynamic: Poor (constant support)    Functional Mobility and Transfers for ADLs:  Bed Mobility:  Rolling: Maximum assistance  Sit to Supine: Maximum assistance;Assist x2  Scooting: Total assistance;Assist x2 (to [facility])    Transfers:  Sit to Stand:  (not tested)  Toilet Transfer :  Total assistance (infer)    ADL Assessment:  Feeding: Setup    Oral Facial Hygiene/Grooming: Minimum assistance (infer)    Bathing: Moderate assistance (infer)         Upper Body Dressing: Minimum assistance (infer)    Lower Body Dressing: Total assistance    Toileting: Total assistance (infer bed level)                ADL Intervention and task modifications:  Feeding  Container Management: Maximum assistance  Cutting Food: Total assistance (dependent)  Utensil Management: Minimum assistance  Food to Mouth: Set-up  Drink to Mouth: Set-up                             Lower Body Dressing Assistance  Socks: Total assistance (dependent)  Leg Crossed Method Used: No  Position Performed: Seated edge of bed         Cognitive Retraining  Safety/Judgement: Decreased insight into deficits; Decreased awareness of need for safety;Decreased awareness of need for assistance     Functional Measure:    Barthel Index:  Bathin  Bladder: 5  Bowels: 5  Groomin  Dressin  Feedin  Mobility: 0  Stairs: 0  Toilet Use: 0  Transfer (Bed to Chair and Back): 5  Total: 25/100      The Barthel ADL Index: Guidelines  1. The index should be used as a record of what a patient does, not as a record of what a patient could do. 2. The main aim is to establish degree of independence from any help, physical or verbal, however minor and for whatever reason. 3. The need for supervision renders the patient not independent. 4. A patient's performance should be established using the best available evidence. Asking the patient, friends/relatives and nurses are the usual sources, but direct observation and common sense are also important. However direct testing is not needed. 5. Usually the patient's performance over the preceding 24-48 hours is important, but occasionally longer periods will be relevant. 6. Middle categories imply that the patient supplies over 50 per cent of the effort. 7. Use of aids to be independent is allowed.     Score Interpretation (from [address] [address])    Independent   60-79 Minimally independent   40-59 Partially dependent   20-39 Very dependent   <20 Totally dependent     -Mayuri Leary, Barthel, D.W. (1390). Functional evaluation: the Barthel Index. [address] ([address] (1997). The Barthel activities of daily living index: self-reporting versus actual performance in the old (> or = 75 years). Journal of [address] [place] [address] 45(7), [address] [facility], [place], Bernie Campbell., Niko Ford. (1999). Measuring the change in disability after inpatient rehabilitation; comparison of the responsiveness of the Barthel Index and Functional McLeod Measure. Journal of Neurology, Neurosurgery, and Psychiatry, 66(4), 447-369. RAJANI Correa, JOSE Coe, & Waleska Gallegos MGeoffreyA. (2004) Assessment of post-stroke quality of life in cost-effectiveness studies: The usefulness of the Barthel Index and the EuroQoL-5D. Quality of Life Research, 15, 531-83     Occupational Therapy Evaluation Charge Determination   History Examination Decision-Making   MEDIUM Complexity : Expanded review of history including physical, cognitive and psychosocial  history  MEDIUM Complexity : 3-5 performance deficits relating to physical, cognitive , or psychosocial skils that result in activity limitations and / or participation restrictions MEDIUM Complexity : Patient may present with comorbidities that affect occupational performnce.  Miniml to moderate modification of tasks or assistance (eg, physical or verbal ) with assesment(s) is necessary to enable patient to complete evaluation       Based on the above components, the patient evaluation is determined to be of the following complexity level: MEDIUM  Pain Rating:  Patient reports lower extremity pain on surgical side, did not rate    Activity Tolerance:   Fair and requires rest breaks    After treatment patient left in no apparent distress:    Supine in bed, Call bell within reach, Bed / chair alarm activated, Caregiver / family present, and Side rails x 3    COMMUNICATION/EDUCATION:   The patients plan of care was discussed with: Physical therapist and Registered nurse. Patient/family have participated as able in goal setting and plan of care. and Patient/family agree to work toward stated goals and plan of care. This patients plan of care is appropriate for delegation to [facility].     Thank you for this referral.  Kim Qureshi, OTR/L  Time Calculation: 24 mins

## 2022-12-27 NOTE — DISCHARGE INSTRUCTIONS
Discharge Instructions       PATIENT ID: Claudine Man  MRN: [id number]   YOB: 1930    DATE OF ADMISSION: 12/26/2022   DATE OF DISCHARGE: 1/2/2023    PRIMARY CARE PROVIDER: Angely Mejia MD     ATTENDING PHYSICIAN: Colt Toro MD  DISCHARGING PROVIDER: Ankur Hale NP    To contact this individual call [phone] and ask the  to page. If unavailable ask to be transferred the Adult Hospitalist Department. DISCHARGE DIAGNOSES: Fracture Left Proximal Femur 2/2 to GLF (s/p Left intertrochanteric hip fracture IM nail), Cardiomyopathy, HFrEF, Hyponatremia (resolved), CKD III, Urine Retention (resolved), Dementia, Hypothryoidism, Osteo/Rheumatoid Arthritis, hx Renal Cell Cancer- Left Nephrectomy, KAREN (can not tolerate CPAP) and obesity    CONSULTATIONS: IP CONSULT TO ORTHOPEDIC SURGERY    PROCEDURES/SURGERIES: Procedure(s):  SYNTHES TFN NAIL    PENDING TEST RESULTS:   At the time of discharge the following test results are still pending: none. FOLLOW UP APPOINTMENTS:     Angely Mejia MD: PCP    Clara Osullivan MD: ORTHOPEDIC SURGERY    ADDITIONAL CARE RECOMMENDATIONS:   You have been deemed stable for discharge and are being discharged to [facility]. Should you need medication refills beyond what we have prescribed for you, you will need to contact your primary care provider for refills.     Return to the ER or notify your primary care office if you have a fever greater than 101.5 associated with chills, chest pain, or signs and symptoms of a stroke which are:  B E F A S T  -loss of balance, headaches or dizziness  -eyes blurred vision (sudden)  -asymmetrical facial symmetry (side of face droops)  -arms and leg weakness  -slurred speech / difficulty speaking  -if you experience any of these (time to call for an ambulance)      DIET: Cardiac Diet    ACTIVITY: PT/OT Eval and Treat  Walk with your walker WBAT weight bearing as instructed by your physical therapist. Continue using your walker until seen for follow-up visit. Practice your exercises 3 times a day as instructed by the physical therapist.  Get up frequently and walk (with assistance as needed)  You may not drive     EQUIPMENT needed: per PT/OT recommendations. WOUND/INCISION CARE:  Keep a dressing on your incision and change daily. Once your incision is not draining, you may leave it open to air. Wash hands thoroughly before changing the dressing. You may take a shower when your incision is dry. Do not take a tub bath or go swimming        DISCHARGE MEDICATIONS:   See Medication Reconciliation Form    It is important that you take the medication exactly as they are prescribed. Keep your medication in the bottles provided by the pharmacist and keep a list of the medication names, dosages, and times to be taken in your wallet. Do not take other medications without consulting your doctor. NOTIFY YOUR PHYSICIAN FOR ANY OF THE FOLLOWING:   Fever over 101 degrees for 24 hours. Chest pain, shortness of breath, fever, chills, nausea, vomiting, diarrhea, change in mentation, falling, weakness, bleeding. Severe pain or pain not relieved by medications. Or, any other signs or symptoms that you may have questions about. DISPOSITION:    Home With:   OT  PT  HH  RN      X SNF/Inpatient Rehab/LTAC: [facility]    Independent/assisted living    Hospice    Other:     CDMP Checked: Yes X     PROBLEM LIST Updated:   Yes X       Signed:   Ankur Hale NP  1/2/2023  11:46 AM     Post op Discharge Instructions Hip Fracture     Patient Name: Claudine Man  Date of admission:  12/26/2022  Date of procedure: 12/26/2022   Procedure: Procedure(s):  SYNTHES TFN NAIL  Surgeon: Abigail Epstein) and Role:     Faith Martinez MD - Primary  PCP: @PCP@  Date of discharge: [unfilled]       Follow up appointment with surgeon  See Surgeon(s) and Role:     Faith Martinez MD - Primary approximately 3-4 weeks from date of surgery. When to call your Orthopaedic Surgeon. If you call after 5pm or on a weekend, the on call physician will be contacted  Pain that is not relieved by pain medication, ice, activity  Signs of infection  Incision is reddened  Incision continues to drain; drainage has an odor  Persistent fever over 101 degrees  Signs of a blood clot in your leg  Calf pain, tenderness, redness and/or swelling of lower leg    When to call your Primary Care Physician  Concerns about medical conditions such as diabetes, high blood pressure, asthma, congestive heart failure  Call if blood sugars are elevated, persistent headache or dizziness, coughing or congestion, constipation or diarrhea, burning with urination, abnormal heart rate (slow or fast)    When to call 911 and go to the nearest emergency room  Acute onset of chest pain, shortness of breath, difficulty breathing    Activity  Walk with your walker WBAT weight bearing as instructed by your physical therapist. Continue using your walker until seen for follow-up visit. Practice your exercises 3 times a day as instructed by the physical therapist.  Get up frequently and walk (with assistance as needed)  You may not drive     Incision Care  Keep a dressing on your incision and change daily. Once your incision is not draining, you may leave it open to air. Wash hands thoroughly before changing the dressing. You may take a shower when your incision is dry.  Do not take a tub bath or go swimming      Preventing blood clots  Lovenox injection 30mg sub q once daily during admission, may resume pre-op chronic anticoagulant Xarelto at discharge    Pain management  Take pain medication as prescribed; decrease the amount you take as your pain lessens  Avoid alcoholic beverages while taking pain medications  Place an ice bag on the hip for 15-20 minutes after exercising and as needed throughout the day and night    Diet  Resume usual diet; drink plenty of fluids; eat foods high in fiber, calcium and vitamin D. You may want to take a stool softener (such as Senokot-S or Colace) to prevent constipations while you are taking pain medication.   If constipation occurs, take a laxative (such as Dulcolax tablets, Miralax, or a suppository)

## 2022-12-27 NOTE — OP NOTES
[facility]  OPERATIVE REPORT    Name:  Yahaira Landis  MR#:  [id number]  :  1930  ACCOUNT #:  [de-identified]  DATE OF SERVICE:  2022    PREOPERATIVE DIAGNOSIS:  Left intertrochanteric hip fracture. POSTOPERATIVE DIAGNOSIS:  Left intertrochanteric hip fracture. PROCEDURE PERFORMED:  IM nail. SURGEON:  Kiko Astudillo MD    ASSISTANT:  Staff. ANESTHESIA:  Regional with block    COMPLICATIONS:  none    SPECIMENS REMOVED:  none  IMPLANTS:  Synthes 10 x 245 TFM. ESTIMATED BLOOD LOSS:  Less than 100 mL. INDICATIONS:  The patient had a ground-level fall sustaining a  comminuted proximal femur fracture. I talked to her about treatment options and the above-mentioned procedure. I explained the risks and benefits including, but not limited to infection, bleeding, damage to the nerves and blood vessels, need for further surgery, continued pain, stiffness, and failure to achieve desired result. The patient agreed to have the procedure done. She was identified as the correct patient, the left side as the correct operative side. She was taken back to the operating room, prepped and draped in normal sterile fashion and given preoperative antibiotics including Ancef. PROCEDURE:  After placing on the [place] table and reducing the fracture under fluoroscopic imaging, a stab incision was made about two fingerbreadths proximal to the tip of the greater troch. Soft tissue was dissected in line with the skin incision exposing the fascia brian. This was split in line with the skin incision as well. The tip of the troch was palpated. The guidewire was advanced under fluoroscopic control on the AP and lateral planes. Once that was done, the opening reamer for the femoral canal was used to open up the femoral canal and the guidewire was removed. Femoral nail was inserted over that. I turned my attention distal to the area of the triple sleeve.   I made a counterincision in line with the triple sleeve. I dissected down through the fascia brian to the lateral side of the femur and then advanced the triple sleeve against the lateral side of the femur. Under fluoroscopic control on the AP and lateral planes, I advanced the guidewire up to the femoral neck and into the femoral head. Once I was satisfied with length and placement, I measured the guidewire and then used the lateral reamer to open the lateral cortex and triple reamer to achieve my guidewire depth. Once that was done, I placed the rotational Helical blade into the femoral shaft. Due to the reduction at this point, I locked the nail statically. I turned my attention distally and using the guide, I inserted the distal interlocking screws. I checked my reduction on AP and lateral live imaging. I was very satisfied with the result. I copiously irrigated all the wounds and closed in layers, now fascia being closed with 0 Vicryl sutures and the skin and subcutaneous tissue with 0 Vicryl suture, 2-0 Vicryl sutures and a Monocryl stitch. POSTOPERATIVE PLAN:  Lovenox for three weeks and then weightbearing as tolerated. See her back in the office in four weeks.       MD TONI Guo/[email or web address]  D:  12/26/2022 18:15  T:  12/27/2022 1:03  JOB #:  [id number]

## 2022-12-28 ENCOUNTER — APPOINTMENT (OUTPATIENT)
Dept: GENERAL RADIOLOGY | Age: 87
DRG: 481 | End: 2022-12-28
Attending: NURSE PRACTITIONER
Payer: MEDICARE

## 2022-12-28 LAB
ANION GAP SERPL CALC-SCNC: 6 MMOL/L (ref 5–15)
BASOPHILS # BLD: 0 K/UL (ref 0–0.1)
BASOPHILS NFR BLD: 0 % (ref 0–1)
BUN SERPL-MCNC: 30 MG/DL (ref 6–20)
BUN/CREAT SERPL: 22 (ref 12–20)
CALCIUM SERPL-MCNC: 8.5 MG/DL (ref 8.5–10.1)
CHLORIDE SERPL-SCNC: 104 MMOL/L (ref 97–108)
CO2 SERPL-SCNC: 25 MMOL/L (ref 21–32)
CREAT SERPL-MCNC: 1.39 MG/DL (ref 0.55–1.02)
DIFFERENTIAL METHOD BLD: ABNORMAL
EOSINOPHIL # BLD: 0 K/UL (ref 0–0.4)
EOSINOPHIL NFR BLD: 0 % (ref 0–7)
ERYTHROCYTE [DISTWIDTH] IN BLOOD BY AUTOMATED COUNT: 13.2 % (ref 11.5–14.5)
GLUCOSE BLD STRIP.AUTO-MCNC: 111 MG/DL (ref 65–117)
GLUCOSE BLD STRIP.AUTO-MCNC: 130 MG/DL (ref 65–117)
GLUCOSE SERPL-MCNC: 170 MG/DL (ref 65–100)
HCT VFR BLD AUTO: 26.1 % (ref 35–47)
HGB BLD-MCNC: 8.4 G/DL (ref 11.5–16)
IMM GRANULOCYTES # BLD AUTO: 0 K/UL (ref 0–0.04)
IMM GRANULOCYTES NFR BLD AUTO: 0 % (ref 0–0.5)
LYMPHOCYTES # BLD: 0.7 K/UL (ref 0.8–3.5)
LYMPHOCYTES NFR BLD: 8 % (ref 12–49)
MAGNESIUM SERPL-MCNC: 2.2 MG/DL (ref 1.6–2.4)
MCH RBC QN AUTO: 30.3 PG (ref 26–34)
MCHC RBC AUTO-ENTMCNC: 32.2 G/DL (ref 30–36.5)
MCV RBC AUTO: 94.2 FL (ref 80–99)
MONOCYTES # BLD: 0.9 K/UL (ref 0–1)
MONOCYTES NFR BLD: 11 % (ref 5–13)
NEUTS SEG # BLD: 6.9 K/UL (ref 1.8–8)
NEUTS SEG NFR BLD: 81 % (ref 32–75)
NRBC # BLD: 0 K/UL (ref 0–0.01)
NRBC BLD-RTO: 0 PER 100 WBC
PHOSPHATE SERPL-MCNC: 3.2 MG/DL (ref 2.6–4.7)
PLATELET # BLD AUTO: 173 K/UL (ref 150–400)
PMV BLD AUTO: 10.1 FL (ref 8.9–12.9)
POTASSIUM SERPL-SCNC: 4.5 MMOL/L (ref 3.5–5.1)
RBC # BLD AUTO: 2.77 M/UL (ref 3.8–5.2)
RBC MORPH BLD: ABNORMAL
RBC MORPH BLD: ABNORMAL
SERVICE CMNT-IMP: ABNORMAL
SERVICE CMNT-IMP: NORMAL
SODIUM SERPL-SCNC: 135 MMOL/L (ref 136–145)
WBC # BLD AUTO: 8.5 K/UL (ref 3.6–11)

## 2022-12-28 PROCEDURE — 80048 BASIC METABOLIC PNL TOTAL CA: CPT

## 2022-12-28 PROCEDURE — 74011250636 HC RX REV CODE- 250/636: Performed by: ORTHOPAEDIC SURGERY

## 2022-12-28 PROCEDURE — 74011250636 HC RX REV CODE- 250/636

## 2022-12-28 PROCEDURE — 36415 COLL VENOUS BLD VENIPUNCTURE: CPT

## 2022-12-28 PROCEDURE — 82962 GLUCOSE BLOOD TEST: CPT

## 2022-12-28 PROCEDURE — 65270000029 HC RM PRIVATE

## 2022-12-28 PROCEDURE — 74018 RADEX ABDOMEN 1 VIEW: CPT

## 2022-12-28 PROCEDURE — 83735 ASSAY OF MAGNESIUM: CPT

## 2022-12-28 PROCEDURE — 74011000250 HC RX REV CODE- 250: Performed by: ORTHOPAEDIC SURGERY

## 2022-12-28 PROCEDURE — 74011250637 HC RX REV CODE- 250/637

## 2022-12-28 PROCEDURE — 74011000250 HC RX REV CODE- 250

## 2022-12-28 PROCEDURE — 74011250637 HC RX REV CODE- 250/637: Performed by: NURSE PRACTITIONER

## 2022-12-28 PROCEDURE — 97110 THERAPEUTIC EXERCISES: CPT

## 2022-12-28 PROCEDURE — 2709999900 HC NON-CHARGEABLE SUPPLY

## 2022-12-28 PROCEDURE — 51798 US URINE CAPACITY MEASURE: CPT

## 2022-12-28 PROCEDURE — 77030036660

## 2022-12-28 PROCEDURE — 74011250637 HC RX REV CODE- 250/637: Performed by: ORTHOPAEDIC SURGERY

## 2022-12-28 PROCEDURE — 85025 COMPLETE CBC W/AUTO DIFF WBC: CPT

## 2022-12-28 PROCEDURE — 97530 THERAPEUTIC ACTIVITIES: CPT

## 2022-12-28 PROCEDURE — 84100 ASSAY OF PHOSPHORUS: CPT

## 2022-12-28 PROCEDURE — 77010033678 HC OXYGEN DAILY

## 2022-12-28 PROCEDURE — 74011000250 HC RX REV CODE- 250: Performed by: EMERGENCY MEDICINE

## 2022-12-28 RX ORDER — HYDROXYZINE HYDROCHLORIDE 10 MG/1
10 TABLET, FILM COATED ORAL
Status: DISCONTINUED | OUTPATIENT
Start: 2022-12-28 | End: 2023-01-02 | Stop reason: HOSPADM

## 2022-12-28 RX ORDER — AMOXICILLIN 250 MG
1 CAPSULE ORAL 2 TIMES DAILY
Status: DISCONTINUED | OUTPATIENT
Start: 2022-12-28 | End: 2023-01-02 | Stop reason: HOSPADM

## 2022-12-28 RX ORDER — FACIAL-BODY WIPES
10 EACH TOPICAL DAILY PRN
Status: DISCONTINUED | OUTPATIENT
Start: 2022-12-30 | End: 2023-01-02 | Stop reason: HOSPADM

## 2022-12-28 RX ORDER — SODIUM CHLORIDE 0.9 % (FLUSH) 0.9 %
5-40 SYRINGE (ML) INJECTION AS NEEDED
Status: DISCONTINUED | OUTPATIENT
Start: 2022-12-28 | End: 2023-01-02 | Stop reason: HOSPADM

## 2022-12-28 RX ORDER — FERROUS SULFATE, DRIED 160(50) MG
1 TABLET, EXTENDED RELEASE ORAL
Status: DISCONTINUED | OUTPATIENT
Start: 2022-12-28 | End: 2023-01-02 | Stop reason: HOSPADM

## 2022-12-28 RX ORDER — ENOXAPARIN SODIUM 100 MG/ML
30 INJECTION SUBCUTANEOUS DAILY
Status: DISCONTINUED | OUTPATIENT
Start: 2022-12-29 | End: 2022-12-30

## 2022-12-28 RX ORDER — SODIUM CHLORIDE 9 MG/ML
125 INJECTION, SOLUTION INTRAVENOUS CONTINUOUS
Status: DISCONTINUED | OUTPATIENT
Start: 2022-12-28 | End: 2022-12-28

## 2022-12-28 RX ORDER — SODIUM CHLORIDE 0.9 % (FLUSH) 0.9 %
5-40 SYRINGE (ML) INJECTION EVERY 8 HOURS
Status: DISCONTINUED | OUTPATIENT
Start: 2022-12-28 | End: 2023-01-02 | Stop reason: HOSPADM

## 2022-12-28 RX ORDER — OXYCODONE HYDROCHLORIDE 5 MG/1
2.5 TABLET ORAL
Status: DISCONTINUED | OUTPATIENT
Start: 2022-12-28 | End: 2022-12-28 | Stop reason: SDUPTHER

## 2022-12-28 RX ORDER — TRAMADOL HYDROCHLORIDE 50 MG/1
50 TABLET ORAL
Status: DISCONTINUED | OUTPATIENT
Start: 2022-12-28 | End: 2023-01-02 | Stop reason: HOSPADM

## 2022-12-28 RX ORDER — POLYETHYLENE GLYCOL 3350 17 G/17G
17 POWDER, FOR SOLUTION ORAL DAILY
Status: DISCONTINUED | OUTPATIENT
Start: 2022-12-29 | End: 2022-12-28 | Stop reason: SDUPTHER

## 2022-12-28 RX ORDER — SENNOSIDES 8.6 MG/1
2 TABLET ORAL
Status: DISCONTINUED | OUTPATIENT
Start: 2022-12-28 | End: 2022-12-28 | Stop reason: SDUPTHER

## 2022-12-28 RX ORDER — NALOXONE HYDROCHLORIDE 0.4 MG/ML
0.4 INJECTION, SOLUTION INTRAMUSCULAR; INTRAVENOUS; SUBCUTANEOUS AS NEEDED
Status: DISCONTINUED | OUTPATIENT
Start: 2022-12-28 | End: 2023-01-02 | Stop reason: HOSPADM

## 2022-12-28 RX ORDER — ENOXAPARIN SODIUM 100 MG/ML
40 INJECTION SUBCUTANEOUS DAILY
Status: DISCONTINUED | OUTPATIENT
Start: 2022-12-29 | End: 2022-12-28 | Stop reason: SDUPTHER

## 2022-12-28 RX ADMIN — SENNOSIDES AND DOCUSATE SODIUM 1 TABLET: 50; 8.6 TABLET ORAL at 17:21

## 2022-12-28 RX ADMIN — POLYETHYLENE GLYCOL 3350 17 G: 17 POWDER, FOR SOLUTION ORAL at 18:41

## 2022-12-28 RX ADMIN — CALCIUM CARBONATE-VITAMIN D TAB 500 MG-200 UNIT 1 TABLET: 500-200 TAB at 17:20

## 2022-12-28 RX ADMIN — SODIUM CHLORIDE, PRESERVATIVE FREE 10 ML: 5 INJECTION INTRAVENOUS at 15:16

## 2022-12-28 RX ADMIN — QUETIAPINE FUMARATE 12.5 MG: 25 TABLET ORAL at 20:53

## 2022-12-28 RX ADMIN — SODIUM CHLORIDE, PRESERVATIVE FREE 10 ML: 5 INJECTION INTRAVENOUS at 15:17

## 2022-12-28 RX ADMIN — ACETAMINOPHEN 650 MG: 325 TABLET ORAL at 06:19

## 2022-12-28 RX ADMIN — DONEPEZIL HYDROCHLORIDE 10 MG: 10 TABLET, FILM COATED ORAL at 20:53

## 2022-12-28 RX ADMIN — OXYCODONE 5 MG: 5 TABLET ORAL at 18:36

## 2022-12-28 RX ADMIN — OXYCODONE 5 MG: 5 TABLET ORAL at 09:54

## 2022-12-28 RX ADMIN — ACETAMINOPHEN 650 MG: 325 TABLET ORAL at 17:21

## 2022-12-28 RX ADMIN — ACETAMINOPHEN 650 MG: 325 TABLET ORAL at 12:00

## 2022-12-28 RX ADMIN — SENNOSIDES 17.2 MG: 8.6 TABLET, FILM COATED ORAL at 15:17

## 2022-12-28 RX ADMIN — ENOXAPARIN SODIUM 40 MG: 100 INJECTION SUBCUTANEOUS at 09:30

## 2022-12-28 RX ADMIN — LEVOTHYROXINE SODIUM 112 MCG: 0.11 TABLET ORAL at 06:20

## 2022-12-28 RX ADMIN — SODIUM CHLORIDE 125 ML/HR: 9 INJECTION, SOLUTION INTRAVENOUS at 17:21

## 2022-12-28 RX ADMIN — ACETAMINOPHEN 650 MG: 325 TABLET ORAL at 00:39

## 2022-12-28 RX ADMIN — SODIUM CHLORIDE, PRESERVATIVE FREE 10 ML: 5 INJECTION INTRAVENOUS at 20:58

## 2022-12-28 RX ADMIN — SODIUM CHLORIDE, PRESERVATIVE FREE 10 ML: 5 INJECTION INTRAVENOUS at 17:23

## 2022-12-28 NOTE — PROGRESS NOTES
Problem: Mobility Impaired (Adult and Pediatric)  Goal: *Acute Goals and Plan of Care (Insert Text)  Description: FUNCTIONAL STATUS PRIOR TO ADMISSION: Patient required stand-by assistance for basic and instrumental ADLs and was able to mobilize with walker. HOME SUPPORT PRIOR TO ADMISSION: The patient lived at [facility]. Per family, patient will be able to get full assistance 24/7 care at [facility] unit and therapy sourced in. Physical Therapy Goals  Initiated 12/27/2022    1. Patient will move from supine to sit and sit to supine , scoot up and down, and roll side to side in bed with minimal assistance/contact guard assist within 4 days. 2. Patient will perform sit to stand with minimal assistance/contact guard assist within 4 days. 3. Patient will ambulate with minimal assistance/contact guard assist for 10 feet with the least restrictive device within 4 days. 4. Patient will perform L hip home exercise program per protocol with minimal assistance/contact guard assist within 4 days. 12/28/2022 1611 by Means, Bri CORREIA  Outcome: Progressing Towards Goal   PHYSICAL THERAPY AFTERNOON SESSION NOTE  Patient: Julio César Garza (89 y.o. female)  Date: 12/28/2022  Primary Diagnosis: Hip fracture ([facility] [address] [S72.009A]  Procedure(s) (LRB):  SYNTHES TFN NAIL (Left) 2 Days Post-Op   Precautions: Fall, WBAT (Left)       Julio César Garza was seen for afternoon PT session. She continues to require Max Ax2 for supine<>sit transfer. Pt continues to c/o pain w/ transfer and sitting EOB. She was able to support herself while seated EOB using BUE (CGA). Pt several times throughout session stating \"just let me die\" d/t pain and lack of mobility. Provided reassurance and emotional support throughout session.   Currently, expect Julio César Garza will need rehab prior to discharging back to her current living facility if her facility is unable to provide additional assist.     Discharge Recommendation:  NURY w/ increased caregiver assist. If detention unable to provide additional assist, then recommend SNF Rehab. Afternoon session functional mobility:  Bed Mobility:     Supine to Sit: Maximum assistance;Assist x2  Sit to Supine: Maximum assistance;Assist x2          Balance:   Sitting: Impaired  Sitting - Static: Fair (occasional); Good (unsupported) (Pt able to sit w/ CGA while using BUE support.  Pt w/ low tolerance to pain w/ WBing)  Ambulation/Gait Training:       Right Side Weight Bearing: Full  Left Side Weight Bearing: As tolerated       Thank you,  Bri Berkowitz,PTA   Time Calculation: 21 mins

## 2022-12-28 NOTE — PROGRESS NOTES
Lovenox Monitoring  Indication: DVT Prophylaxis  Recent Labs     12/28/22  0055 12/26/22  0755   HGB 8.4* 13.3    237   CREA 1.39* 1.21*     Current Weight: 83.4 kg  Est. CrCl = 27 ml/min  Current Dose: 40 mg subcutaneously every 24 hours.   Plan: Change to 30mg subcutaneously every 24 hours

## 2022-12-28 NOTE — PROGRESS NOTES
RUR: 15% Medium    NICO: Anticipated return to [facility] (p: [phone]) with Edgargiorgio Ramirezronald. Transport TBD; family versus BLS transport once medically stable. Follow-up with PCP/specialist.     Primary Contact: Daughter, Leonidas Robles, [phone]    *Will need 2nd IM letter prior to DC.    9:15AM - CM reviewed chart. Per previous CM note, NURY Assistance Director, Jesus Alberto Yan ([phone]) requesting therapy/clinical notes once available. CM spoke with XLerantsamantha to confirm to fax # (f: [phone]). Clinicals faxed. Per REHAPP Yuriy, he and his team will review to see if able to accept patient back with home health services. Dillan to contact CM after review. 1:35PM - CM called [facility] and attempted to speak with XLerantsamantha to obtain update if NURY is able to accommodate patient's current level of needs; however, unable to do so. CM faxed additional PT note from today's session. Awaiting return call.      Zoë Esposito, MSW   [phone]

## 2022-12-28 NOTE — PROGRESS NOTES
Problem: Mobility Impaired (Adult and Pediatric)  Goal: *Acute Goals and Plan of Care (Insert Text)  Description: FUNCTIONAL STATUS PRIOR TO ADMISSION: Patient required stand-by assistance for basic and instrumental ADLs and was able to mobilize with walker. HOME SUPPORT PRIOR TO ADMISSION: The patient lived at [facility]. Per family, patient will be able to get full assistance 24/7 care at [facility] unit and therapy sourced in. Physical Therapy Goals  Initiated 12/27/2022    1. Patient will move from supine to sit and sit to supine , scoot up and down, and roll side to side in bed with minimal assistance/contact guard assist within 4 days. 2. Patient will perform sit to stand with minimal assistance/contact guard assist within 4 days. 3. Patient will ambulate with minimal assistance/contact guard assist for 10 feet with the least restrictive device within 4 days. 4. Patient will perform L hip home exercise program per protocol with minimal assistance/contact guard assist within 4 days. Outcome: Progressing Towards Goal  PHYSICAL THERAPY MORNING SESSION NOTE  Patient: Chito Darby (89 y.o. female)  Date: 12/28/2022  Primary Diagnosis: Hip fracture ([address] [S72.009A]  Procedure(s) (LRB):  SYNTHES TFN NAIL (Left) 2 Days Post-Op   Precautions: Fall, WBAT       Chito Darby was seen for morning PT session. Pt completed bed level exercises this sessio; AROM of RLE noted and minimal PROM on Left d/t anticipation and presence of pain. Pt grimacing and wincing w/ attempts to PROM of LLE. Pt's caregiver present at bedside, reported that pt did receive med about 30 minutes prior to therapy arrival. Spoke w/ RN and RN confirmed med given was pain meds. The limiting factors are pain tolerance and current need for Max Ax2 to complete bed mobility. Ice pack provided for pt and applied to Left hip.   Currently, barrera Darby will need rehab prior to discharging to prior place of residence if additional assist cannot be provided at [facility]. Will follow this afternoon as able and appropriate. Discharge recommendation: [facility] w/ increased caregiver assist. If [facility] unable to provide additional assist, then recommend SNF Rehab. Morning session functional mobility:  Bed Mobility:   Pt assessed as requiring Max Ax 2 during previous session(s). Therapeutic Exercises:       EXERCISE   Sets   Reps   Active Active Assist   Passive Self ROM   Comments   Ankle Pumps  10 [x] [] [] []    Quad Sets/Glut Sets  10 [x] RLE [] [] [] Weak quad contraction on Left.  Pt c/o pain and wincing and grimacing w/ pain    Hamstring Sets   [] [] [] []    Short Arc Quads   [] [] [] []    Heel Slides   [x]RLE [] [x] LLE: pt unable to tolerate PROM; noted mild resistance in anticipation of pain []    Straight Leg Raises   [] [] [] []    Hip abd/add   [] [] [x] []    Long Arc Quads   [] [] [] []    Marching   [] [] [] []       [] [] [] []         Thank you,  Bri CORREIA Means,PTA   Time Calculation: 18 mins

## 2022-12-28 NOTE — PROGRESS NOTES
Orthopedic Progress Note    S: Pain well controlled, but  patient states \"no one is helping me\", does not answer further questions; Denies numbness, tingling, focal foot weakness, cp, sob, fever, chills. ROS unreliable given her confusion. O: NAD, respirations unlabored; sitting bed appears comfortable; Dressings CDI; thigh soft, no edema, no posterior calf ttp; DF/PF 5/5, SILT; Cap refill brisk, foot warm, DP2+    Patient Vitals for the past 4 hrs:   Temp Pulse BP   12/28/22 1434 98.1 °F (36.7 °C) 65 (!) 98/56     Recent Labs     12/28/22  0055 12/26/22  0755   HGB 8.4* 13.3   HCT 26.1* 41.2    237   BUN 30* 15   CREA 1.39* 1.21*   K 4.5 4.7   * 141       XR ABD (KUB)  Narrative: EXAM:  XR ABD (KUB)    INDICATION: Abdominal distention. COMPARISON: CT abdomen pelvis 10/4/2016. TECHNIQUE: Supine frontal abdomen (KUB). FINDINGS: There are no dilated small or large bowel loops. Chronic L2  compression fracture status post kyphoplasty. Impression: No evidence of bowel obstruction. A/P:  Procedure: Procedure(s):  SYNTHES TFN NAIL  Post Op day: 2 Days Post-Op    - DVT ppx - Lovenox daily x 4 weeks or  resume Xarelto ; SCDs  - PT/OT - WBAT  - Pain - APAP, avoid narcotics due to confusion; Ice, Elevation, Ace wrap as needed  - Dressing - Leave in place if it remains dry and intact; change as needed for saturation with dry sterile island dressing  - Dispo - Pending PT/OT recs, likely return to [facility]; needs f/u in 3-4 weeks with Dr. Darlene Gray; refer to DC instructions for further details.     LIZBETH Lopez  Orthopedic Trauma Service  [address]

## 2022-12-28 NOTE — PROGRESS NOTES
Bedside shift change report given to Yuni Frazier (oncoming nurse) by nas (offgoing nurse). Report included the following information SBAR, Kardex, and MAR.

## 2022-12-29 LAB
ANION GAP SERPL CALC-SCNC: 3 MMOL/L (ref 5–15)
BASOPHILS # BLD: 0 K/UL (ref 0–0.1)
BASOPHILS NFR BLD: 0 % (ref 0–1)
BUN SERPL-MCNC: 24 MG/DL (ref 6–20)
BUN/CREAT SERPL: 26 (ref 12–20)
CALCIUM SERPL-MCNC: 9.1 MG/DL (ref 8.5–10.1)
CHLORIDE SERPL-SCNC: 103 MMOL/L (ref 97–108)
CO2 SERPL-SCNC: 29 MMOL/L (ref 21–32)
CREAT SERPL-MCNC: 0.94 MG/DL (ref 0.55–1.02)
DIFFERENTIAL METHOD BLD: ABNORMAL
EOSINOPHIL # BLD: 0.1 K/UL (ref 0–0.4)
EOSINOPHIL NFR BLD: 2 % (ref 0–7)
ERYTHROCYTE [DISTWIDTH] IN BLOOD BY AUTOMATED COUNT: 13.4 % (ref 11.5–14.5)
GLUCOSE BLD STRIP.AUTO-MCNC: 139 MG/DL (ref 65–117)
GLUCOSE BLD STRIP.AUTO-MCNC: 94 MG/DL (ref 65–117)
GLUCOSE SERPL-MCNC: 92 MG/DL (ref 65–100)
HCT VFR BLD AUTO: 25.7 % (ref 35–47)
HGB BLD-MCNC: 8.2 G/DL (ref 11.5–16)
IMM GRANULOCYTES # BLD AUTO: 0 K/UL (ref 0–0.04)
IMM GRANULOCYTES NFR BLD AUTO: 0 % (ref 0–0.5)
LYMPHOCYTES # BLD: 1.1 K/UL (ref 0.8–3.5)
LYMPHOCYTES NFR BLD: 16 % (ref 12–49)
MAGNESIUM SERPL-MCNC: 2 MG/DL (ref 1.6–2.4)
MCH RBC QN AUTO: 30.1 PG (ref 26–34)
MCHC RBC AUTO-ENTMCNC: 31.9 G/DL (ref 30–36.5)
MCV RBC AUTO: 94.5 FL (ref 80–99)
MONOCYTES # BLD: 0.8 K/UL (ref 0–1)
MONOCYTES NFR BLD: 11 % (ref 5–13)
NEUTS SEG # BLD: 4.8 K/UL (ref 1.8–8)
NEUTS SEG NFR BLD: 71 % (ref 32–75)
NRBC # BLD: 0 K/UL (ref 0–0.01)
NRBC BLD-RTO: 0 PER 100 WBC
PHOSPHATE SERPL-MCNC: 2.4 MG/DL (ref 2.6–4.7)
PLATELET # BLD AUTO: 176 K/UL (ref 150–400)
PMV BLD AUTO: 10 FL (ref 8.9–12.9)
POTASSIUM SERPL-SCNC: 4.7 MMOL/L (ref 3.5–5.1)
RBC # BLD AUTO: 2.72 M/UL (ref 3.8–5.2)
SERVICE CMNT-IMP: ABNORMAL
SERVICE CMNT-IMP: NORMAL
SODIUM SERPL-SCNC: 135 MMOL/L (ref 136–145)
WBC # BLD AUTO: 6.8 K/UL (ref 3.6–11)

## 2022-12-29 PROCEDURE — 74011000250 HC RX REV CODE- 250

## 2022-12-29 PROCEDURE — 74011000250 HC RX REV CODE- 250: Performed by: ORTHOPAEDIC SURGERY

## 2022-12-29 PROCEDURE — 74011250637 HC RX REV CODE- 250/637

## 2022-12-29 PROCEDURE — 36415 COLL VENOUS BLD VENIPUNCTURE: CPT

## 2022-12-29 PROCEDURE — 65270000029 HC RM PRIVATE

## 2022-12-29 PROCEDURE — 85025 COMPLETE CBC W/AUTO DIFF WBC: CPT

## 2022-12-29 PROCEDURE — 80048 BASIC METABOLIC PNL TOTAL CA: CPT

## 2022-12-29 PROCEDURE — 74011250637 HC RX REV CODE- 250/637: Performed by: ORTHOPAEDIC SURGERY

## 2022-12-29 PROCEDURE — 74011250636 HC RX REV CODE- 250/636

## 2022-12-29 PROCEDURE — 82962 GLUCOSE BLOOD TEST: CPT

## 2022-12-29 PROCEDURE — 83735 ASSAY OF MAGNESIUM: CPT

## 2022-12-29 PROCEDURE — 84100 ASSAY OF PHOSPHORUS: CPT

## 2022-12-29 PROCEDURE — 97535 SELF CARE MNGMENT TRAINING: CPT

## 2022-12-29 RX ADMIN — CALCIUM CARBONATE-VITAMIN D TAB 500 MG-200 UNIT 1 TABLET: 500-200 TAB at 10:22

## 2022-12-29 RX ADMIN — SODIUM CHLORIDE, PRESERVATIVE FREE 10 ML: 5 INJECTION INTRAVENOUS at 14:00

## 2022-12-29 RX ADMIN — CALCIUM CARBONATE-VITAMIN D TAB 500 MG-200 UNIT 1 TABLET: 500-200 TAB at 17:12

## 2022-12-29 RX ADMIN — QUETIAPINE FUMARATE 12.5 MG: 25 TABLET ORAL at 22:03

## 2022-12-29 RX ADMIN — OXYCODONE 5 MG: 5 TABLET ORAL at 10:22

## 2022-12-29 RX ADMIN — SENNOSIDES AND DOCUSATE SODIUM 1 TABLET: 50; 8.6 TABLET ORAL at 17:12

## 2022-12-29 RX ADMIN — DONEPEZIL HYDROCHLORIDE 10 MG: 10 TABLET, FILM COATED ORAL at 22:03

## 2022-12-29 RX ADMIN — SENNOSIDES AND DOCUSATE SODIUM 1 TABLET: 50; 8.6 TABLET ORAL at 10:18

## 2022-12-29 RX ADMIN — ACETAMINOPHEN 650 MG: 325 TABLET ORAL at 17:12

## 2022-12-29 RX ADMIN — ACETAMINOPHEN 650 MG: 325 TABLET ORAL at 06:48

## 2022-12-29 RX ADMIN — ACETAMINOPHEN 650 MG: 325 TABLET ORAL at 00:14

## 2022-12-29 RX ADMIN — SODIUM CHLORIDE, PRESERVATIVE FREE 10 ML: 5 INJECTION INTRAVENOUS at 22:04

## 2022-12-29 RX ADMIN — ENOXAPARIN SODIUM 30 MG: 100 INJECTION SUBCUTANEOUS at 10:17

## 2022-12-29 RX ADMIN — LEVOTHYROXINE SODIUM 112 MCG: 0.11 TABLET ORAL at 06:47

## 2022-12-29 RX ADMIN — ACETAMINOPHEN 650 MG: 325 TABLET ORAL at 11:53

## 2022-12-29 NOTE — PROGRESS NOTES
Problem: Self Care Deficits Care Plan (Adult)  Goal: *Acute Goals and Plan of Care (Insert Text)  Description: FUNCTIONAL STATUS PRIOR TO ADMISSION: Patient was modified independent using a rolling walker for functional mobility. Patient required minimal assistance for basic and instrumental ADLs. Patient able to complete most ADLs independently, did require assist for showers and IADLs. HOME SUPPORT: Patient stayed at [facility]/memory care, assist for showers and IADLs but otherwise mod independent. Occupational Therapy Goals  Initiated 12/27/2022  1. Patient will perform self-feeding with modified independence within 7 day(s). 2.  Patient will perform lower body dressing with maximal assistance within 7 day(s). 3.  Patient will perform grooming in unsupported sitting with supervision/set-up within 7 day(s). 4.  Patient will perform toilet transfers with moderate assistance  within 7 day(s). 5.  Patient will perform all aspects of toileting with moderate assistance  within 7 day(s). 6.  Patient will participate in upper extremity therapeutic exercise/activities with supervision/set-up for 10 minutes within 7 day(s). 7.  Patient will utilize energy conservation techniques during functional activities with verbal and visual cues within 7 day(s). Outcome: Progressing Towards Goal     OCCUPATIONAL THERAPY TREATMENT  Patient: Israel Anand (56 y.o. female)  Date: 12/29/2022  Diagnosis: Hip fracture ([address] Mercedes Dixon <principal problem not specified>  Procedure(s) (LRB):  SYNTHES TFN NAIL (Left) 3 Days Post-Op  Precautions:    Chart, occupational therapy assessment, plan of care, and goals were reviewed. ASSESSMENT  Patient continues with skilled OT services and is not progressing towards goals. Pt received in the bed and agreeable to light grooming activities. She did not have her hearing aides in assisted her with donning which she did with min A. Both appear to be functioning.   She agreed to grooming. She completed oral care with mod a. She has decreased hand to mouth coordination and noted with limited shoulder flexion in her LUE. She would not answer questions and noted only with moaning. RN reports she can be disgruntled at times but overall agreeable this morning. She will require 24/7 care at discharge and she will require possible romain lift to the w/c for home as well due to continued max A x 2 persons for functional transfers to EOB. Current Level of Function Impacting Discharge (ADLs): mod A for light grooming, up to max A for feeding depending cognition and attention to task. She would benefit from more structure (a regular routine) to improve po feeding activities    Other factors to consider for discharge: cognition         PLAN :  Patient continues to benefit from skilled intervention to address the above impairments. Continue treatment per established plan of care to address goals. Recommend with staff: Bed in chair position for meals, offer support at meal times as needed    Recommend next OT session:  continue with established plan of care    Recommendation for discharge: (in order for the patient to meet his/her long term goals)  Therapy up to 5 days/week in SNF setting v. Increased support at [facility] but will need 24/7 care    This discharge recommendation:  Has been made in collaboration with the attending provider and/or case management    IF patient discharges home will need the following DME: If home, may require romain lift       SUBJECTIVE:   Patient stated Yes.     OBJECTIVE DATA SUMMARY:   Cognitive/Behavioral Status:  Neurologic State: Confused; Restless  Orientation Level: Oriented to person;Disoriented to time;Disoriented to situation;Disoriented to place (answers to name only)  Cognition:  (limited command following)  Perception: Appears intact          Functional Mobility and Transfers for ADLs:  Bed Mobility:       Transfers:             Balance: ADL Intervention:  Pt working with OT for light grooming sitting up in bed. She did grasp the toothpaste with her right hand and therapist placed toothbrush in her left hand. She did bring her left hand to her mouth but needed max A to apply toothpaste. Instead of spitting in the basin, she spits all over herself. She washed her face with min A using her right hand. Offered clothe to wipe hands and needed max A for whipping hands. Feeding  Feeding Assistance: Total assistance (dependent)  Container Management: Total assistance (dependent)  Cutting Food: Total assistance (dependent)  Food to Mouth: Total assistance (dependent)  Drink to Mouth: Total assistance (dependent) (unable to attend and sustain self feeding this morning)    Grooming  Grooming Assistance: Moderate assistance  Position Performed:  (sitting up in bed)  Washing Hands: Maximum assistance (handed the wash clothe and noted decreased NAKUL hand coordination)  Brushing Teeth: Moderate assistance                             Toileting  Toileting Assistance:  (purdy in place)           Pain:  No pain when asked    Activity Tolerance:   Fair    After treatment patient left in no apparent distress:   Supine in bed, Call bell within reach, and Bed / chair alarm activated; side rails up x 3    COMMUNICATION/COLLABORATION:   The patients plan of care was discussed with: Physical therapist and Registered nurse.      Ashley Greenwood OT  Time Calculation: 16 mins

## 2022-12-29 NOTE — PROGRESS NOTES
Bedside and Verbal shift change report given to Jackelyn Benito RN (oncoming nurse) by Alma Kwon RN (offgoing nurse). Report included the following information SBAR, Kardex, Procedure Summary, Intake/Output, MAR, Accordion, and Recent Results.

## 2022-12-29 NOTE — PROGRESS NOTES
Bedside shift change report given to Venus (oncoming nurse) by nas (offgoing nurse). Report included the following information SBAR, Kardex, Intake/Output, and MAR.

## 2022-12-29 NOTE — PROGRESS NOTES
Orthopedic Progress Note    S: Pain still well controlled, patient again seems to understand me, but also does not elect or unable to answer my line of questioning    O: NAD, respirations unlabored; sitting bed appears comfortable; Dressings CDI; thigh soft, no edema, no posterior calf ttp; DF/PF 5/5, SILT; Cap refill brisk, foot warm, DP2+    No data found. Recent Labs     12/29/22  0658 12/28/22  0055   HGB 8.2* 8.4*   HCT 25.7* 26.1*    173   BUN 24* 30*   CREA 0.94 1.39*   K 4.7 4.5   * 135*       XR ABD (KUB)  Narrative: EXAM:  XR ABD (KUB)    INDICATION: Abdominal distention. COMPARISON: CT abdomen pelvis 10/4/2016. TECHNIQUE: Supine frontal abdomen (KUB). FINDINGS: There are no dilated small or large bowel loops. Chronic L2  compression fracture status post kyphoplasty. Impression: No evidence of bowel obstruction. A/P:  Procedure: Procedure(s):  SYNTHES TFN NAIL  Post Op day: 3 Days Post-Op  - DVT ppx - Lovenox daily x 4 weeks or  resume Xarelto ; SCDs  - PT/OT - WBAT  - Pain - APAP, avoid narcotics due to confusion; Ice, Elevation, Ace wrap as needed  - Dressing - Leave in place if it remains dry and intact; change as needed for saturation with dry sterile island dressing  - Dispo - Pending PT/OT recs, likely return to [facility]; needs f/u in 3-4 weeks with Dr. Ingrid Castellano; refer to DC instructions for further details. Will sign off, call with questions.      LIZBETH Clay  Orthopedic Trauma Service  [facility]

## 2022-12-29 NOTE — PROGRESS NOTES
[facility] Adult  Hospitalist Group                                                                                          Hospitalist Progress Note  Chico Pierre NP  Answering service: [phone] OR [phone] from in house phone        Date of Service:  2022  NAME:  Deyanira Land  :  1930  MRN:  [id number]      Admission Summary:   Deyanira Land is a 80 y.o. female with a PMH of HLD, Hypothyroidism, Renal Cell Carcinoma s/p Left Nephrectomy, KAREN, Osteoarthritis, Rheumatoid Arthritis, Dementia, Cardiomyopathy, HFrEF and Obesity who presented to [facility] from [facility] unit for eval after a GLF on am of presentation, denied head injury, work- up revealed proximal left femur fracture and transferred to [facility] for Orthopedic Service eval, hospitalist service consulted for admit. Interval history / Subjective: Follow-up for issues listed below.   -Patient seen and examined, no c/o's. Stable from medical standpoint. Assessment & Plan:     Fracture Left Proximal Femur:  - XR left hip w/wo : fracture proximal left femur.  - pain management  - Ortho Sx followin/26 s/p Left intertrochanteric hip fracture IM nail.   - PT/OT   - Fall precautions      Cardiomyopathy/HFrEF:   - CXR : Prominent size heart. Minimal vascular prominence. No focal infiltrate. - EKG : SR w/ PAC's. - ECHO MAGEN 2020: EF 40-45%     Hyponatremia: d/c IVF's, monitor lytes. CKD III/Urine retention:   - monitor renal function, renal dose meds, avoid nephrotoxic agents. - UA : negative  - bladder scan and insert purdy cath if urine retention ( straight cath for >400 mL on  per nurse)     AFIB: xarelto at home, held. Consider d/c risk vs benefit given falls. Dementia: supportive care lives in memory care unit, continue home regimen. Hypothyroidism: continue synthroid. Arthritis (RA and OA): pain control, [place].  Hx of renal cell carcinoma: left nephrectomy. KAREN: supplemental O2, no CPAP at home. DVTppx: lovenox  Code Status: DNR  Diet: Cardiac  Activity: OOB to chair TID and PRN  Discharge: return to [facility] intermediate  Ambulates: [place]     Hospital Problems  Date Reviewed: 1/12/2021            Codes Class Noted POA    Hip fracture [facility]) ICD-10-CM: E69.142J  ICD-9-CM: 820.8  12/26/2022 Unknown             Review of Systems:   A comprehensive review of systems was negative except for that written in the HPI. Vital Signs:    Last 24hrs VS reviewed since prior progress note. Most recent are:  Visit Vitals  BP (!) 97/55 (BP 1 Location: Right upper arm, BP Patient Position: At rest)   Pulse 79   Temp 98.8 °F (37.1 °C)   Resp 16   Ht 5' 5\" (1.651 m)   Wt 86.9 kg (191 lb 9.6 oz)   SpO2 96%   BMI 31.88 kg/m²         Intake/Output Summary (Last 24 hours) at 12/29/2022 1050  Last data filed at 12/29/2022 1012  Gross per 24 hour   Intake --   Output 1650 ml   Net -1650 ml        Physical Examination:     I had a face to face encounter with this patient and independently examined them on 12/29/2022 as outlined below:    Constitutional:  No acute distress. ENT:  Oral mucosa moist.    Resp:  CTA bilaterally. No wheezing/rhonchi/rales. No accessory muscle use. CV:  Regular rhythm, normal rate, S1,S2.    GI/:  Soft, slightly distended, non tender, no guarding, BS present. Silvestre patent, clr/yell. Musculoskeletal:  Trace BLE edema, warm. Neurologic:  Moves all extremities. AAOx2. Skin:  w/d. Surgical site dressing: CDI  Psych:  Not anxious nor agitated.           Data Review:    Review and/or order of clinical lab test      Labs:     Recent Labs     12/29/22  0658 12/28/22  0055   WBC 6.8 8.5   HGB 8.2* 8.4*   HCT 25.7* 26.1*    173     Recent Labs     12/29/22  0658 12/28/22  0055   * 135*   K 4.7 4.5    104   CO2 29 25   BUN 24* 30*   CREA 0.94 1.39*   GLU 92 170*   CA 9.1 8.5   MG 2.0 2.2   PHOS 2.4* 3.2     No results for input(s): ALT, AP, TBIL, TBILI, TP, ALB, GLOB, GGT, AML, LPSE in the last 72 hours. No lab exists for component: SGOT, GPT, AMYP, HLPSE  No results for input(s): INR, PTP, APTT, INREXT in the last 72 hours. No results for input(s): FE, TIBC, PSAT, FERR in the last 72 hours. No results found for: FOL, RBCF   No results for input(s): PH, PCO2, PO2 in the last 72 hours. No results for input(s): CPK, CKNDX, TROIQ in the last 72 hours.     No lab exists for component: CPKMB  Lab Results   Component Value Date/Time    Cholesterol, total 194 10/05/2016 01:42 AM    HDL Cholesterol 48 10/05/2016 01:42 AM    LDL, calculated 117.2 (H) 10/05/2016 01:42 AM    Triglyceride 144 10/05/2016 01:42 AM    CHOL/HDL Ratio 4.0 10/05/2016 01:42 AM     Lab Results   Component Value Date/Time    Glucose (POC) 94 12/29/2022 10:46 AM    Glucose (POC) 130 (H) 12/28/2022 04:10 PM    Glucose (POC) 111 12/28/2022 11:20 AM    Glucose (POC) 130 (H) 12/27/2022 04:16 PM    Glucose (POC) 197 (H) 12/27/2022 11:20 AM     Lab Results   Component Value Date/Time    Color YELLOW/STRAW 12/26/2022 09:42 AM    Appearance CLEAR 12/26/2022 09:42 AM    Specific gravity 1.025 12/26/2022 09:42 AM    Specific gravity 1.010 12/22/2020 10:27 PM    pH (UA) 6.0 12/26/2022 09:42 AM    Protein TRACE (A) 12/26/2022 09:42 AM    Glucose Negative 12/26/2022 09:42 AM    Ketone Negative 12/26/2022 09:42 AM    Bilirubin Negative 12/26/2022 09:42 AM    Urobilinogen 0.2 12/26/2022 09:42 AM    Nitrites Negative 12/26/2022 09:42 AM    Leukocyte Esterase TRACE (A) 12/26/2022 09:42 AM    Epithelial cells FEW 12/26/2022 09:42 AM    Bacteria Negative 12/26/2022 09:42 AM    WBC 5-10 12/26/2022 09:42 AM    RBC 0-5 12/26/2022 09:42 AM         Medications Reviewed:     Current Facility-Administered Medications   Medication Dose Route Frequency    sodium chloride (NS) flush 5-40 mL  5-40 mL IntraVENous Q8H    sodium chloride (NS) flush 5-40 mL  5-40 mL IntraVENous PRN    naloxone (NARCAN) injection 0.4 mg  0.4 mg IntraVENous PRN    calcium-vitamin D (OS-GERMAN +D3) 500 mg-200 unit per tablet 1 Tablet  1 Tablet Oral TID WITH MEALS    senna-docusate (PERICOLACE) 8.6-50 mg per tablet 1 Tablet  1 Tablet Oral BID    [START ON 12/30/2022] bisacodyL (DULCOLAX) suppository 10 mg  10 mg Rectal DAILY PRN    traMADoL (ULTRAM) tablet 50 mg  50 mg Oral Q6H PRN    hydrOXYzine HCL (ATARAX) tablet 10 mg  10 mg Oral Q8H PRN    enoxaparin (LOVENOX) injection 30 mg  30 mg SubCUTAneous DAILY    sodium chloride (NS) flush 5-40 mL  5-40 mL IntraVENous Q8H    sodium chloride (NS) flush 5-40 mL  5-40 mL IntraVENous PRN    sodium chloride (NS) flush 5-40 mL  5-40 mL IntraVENous Q8H    sodium chloride (NS) flush 5-40 mL  5-40 mL IntraVENous PRN    polyethylene glycol (MIRALAX) packet 17 g  17 g Oral DAILY PRN    ondansetron (ZOFRAN ODT) tablet 4 mg  4 mg Oral Q8H PRN    Or    ondansetron (ZOFRAN) injection 4 mg  4 mg IntraVENous Q6H PRN    levothyroxine (SYNTHROID) tablet 112 mcg  112 mcg Oral ACB    acetaminophen (TYLENOL) tablet 650 mg  650 mg Oral Q6H    oxyCODONE IR (ROXICODONE) tablet 5 mg  5 mg Oral Q4H PRN    morphine injection 2 mg  2 mg IntraVENous Q4H PRN    donepeziL (ARICEPT) tablet 10 mg  10 mg Oral QHS    QUEtiapine (SEROquel) tablet 12.5 mg  12.5 mg Oral QHS    traZODone (DESYREL) tablet 50 mg  50 mg Oral QHS PRN     ______________________________________________________________________  EXPECTED LENGTH OF STAY: 3d 4h  ACTUAL LENGTH OF STAY:          3                 Maite Latham NP

## 2022-12-29 NOTE — PROGRESS NOTES
RUR: 13% Low     NICO: Anticipated return to [facility] (p: [phone]) with [facility] vs. rehab. Transport TBD; family versus BLS transport once medically stable. Follow-up with PCP/specialist.      Primary Contact: Daughter, Cash Mari, [phone]     *Will need 2nd IM letter prior to DC.    8:45AM - CM called [place] and attempted to speak with Mirna Lemons,  to obtain update if [facility] is able to accommodate patient's current level of needs; however, unable to do so. CM left voicemail requesting call back ASAP. Awaiting return call. CM provided update to Unit CM via phone. 12:15PM - CM attempted to contact Mirna Lemons once more; again no answer.      MYA Oakley   [phone]

## 2022-12-29 NOTE — PROGRESS NOTES
[address] [facility]  [facility]ist Group                                                                                          Hospitalist Progress Note  Neymar Parson NP  Answering service: [phone] OR [phone] from in house phone        Date of Service:  2022  NAME:  Reddy Arambula  :  1930  MRN:  [id number]      Admission Summary:   Reddy Arambula is a 80 y.o. female with a PMH of HLD, Hypothyroidism, Renal Cell Carcinoma s/p Left Nephrectomy, KAREN, Osteoarthritis, Rheumatoid Arthritis, Dementia, Cardiomyopathy, HFrEF and Obesity who presented to [facility] from [facility] unit for eval after a GLF on am of presentation, denied head injury, work- up revealed proximal left femur fracture and transferred to [facility] for Orthopedic Service eval, hospitalist service consulted for admit. Interval history / Subjective: Follow-up for issues listed below.   -Patient seen and examined, no c/o's. KUB - abd distention: no bowel obstruction, bowel regimen added. Assessment & Plan:     Fracture Left Proximal Femur:  - XR left hip w/wo : fracture proximal left femur.  - pain management  - Ortho Sx followin/26 s/p Left intertrochanteric hip fracture IM nail.   - PT/OT   - Fall precautions      Cardiomyopathy/HFrEF:   - CXR : Prominent size heart. Minimal vascular prominence. No focal infiltrate. - EKG : SR w/ PAC's. - ECHO MAGEN 2020: EF 40-45%    Hyponatremia: d/c IVF's, monitor lytes. CKD III/Urine retention:   - monitor renal function, renal dose meds, avoid nephrotoxic agents. - bladder scan and insert purdy cath if urine retention ( straight cath for >400 mL on  per nurse)    AFIB: xarelto at home, held. Consider d/c risk vs benefit given falls. Dementia: supportive care lives in memory care unit, continue home regimen. Hypothyroidism: continue synthroid.    Arthritis (RA and OA): pain control, [place].  Hx of renal cell carcinoma: left nephrectomy. KAREN: supplemental O2, no CPAP at home. DVTppx: lovenox  Code Status: DNR  Diet: Cardiac  Activity: OOB to chair TID and PRN  Discharge: [facility]  Ambulates: [place]     Hospital Problems  Date Reviewed: 1/12/2021            Codes Class Noted POA    Hip fracture [facility]) ICD-10-CM: X30.718U  ICD-9-CM: 820.8  12/26/2022 Unknown             Review of Systems:   A comprehensive review of systems was negative except for that written in the HPI. Vital Signs:    Last 24hrs VS reviewed since prior progress note. Most recent are:  Visit Vitals  BP (!) 98/56 (BP 1 Location: Right upper arm, BP Patient Position: Lying)   Pulse 65   Temp 98.1 °F (36.7 °C)   Resp 16   Ht 5' 5\" (1.651 m)   Wt 83.4 kg (183 lb 13.8 oz)   SpO2 94%   BMI 30.60 kg/m²         Intake/Output Summary (Last 24 hours) at 12/28/2022 1920  Last data filed at 12/28/2022 6105  Gross per 24 hour   Intake --   Output 600 ml   Net -600 ml        Physical Examination:     I had a face to face encounter with this patient and independently examined them on 12/28/2022 as outlined below:          Constitutional:  No acute distress. ENT:  Oral mucosa moist.    Resp:  CTA bilaterally. No wheezing/rhonchi/rales. No accessory muscle use. CV:  Regular rhythm, normal rate, S1,S2.    GI/:  Soft, slightly distended, non tender, no guarding, BS present. Voids Freely. Musculoskeletal:  Trace BLE edema, warm. Neurologic:  Moves all extremities. AAOx2. Skin:  w/d. Surgical site dressing: CDI  Psych:  Not anxious nor agitated.                Data Review:    Review and/or order of clinical lab test      Labs:     Recent Labs     12/28/22  0055 12/26/22  0755   WBC 8.5 9.9   HGB 8.4* 13.3   HCT 26.1* 41.2    237     Recent Labs     12/28/22  0055 12/26/22  0755   * 141   K 4.5 4.7    105   CO2 25 28   BUN 30* 15   CREA 1.39* 1.21*   * 109*   CA 8.5 9.5   MG 2.2  --    PHOS 3.2  --      Recent Labs 12/26/22  0755   ALT 16   *   TBILI 0.5   TP 7.5   ALB 3.4*   GLOB 4.1*     No results for input(s): INR, PTP, APTT, INREXT in the last 72 hours. No results for input(s): FE, TIBC, PSAT, FERR in the last 72 hours. No results found for: FOL, RBCF   No results for input(s): PH, PCO2, PO2 in the last 72 hours. No results for input(s): CPK, CKNDX, TROIQ in the last 72 hours.     No lab exists for component: CPKMB  Lab Results   Component Value Date/Time    Cholesterol, total 194 10/05/2016 01:42 AM    HDL Cholesterol 48 10/05/2016 01:42 AM    LDL, calculated 117.2 (H) 10/05/2016 01:42 AM    Triglyceride 144 10/05/2016 01:42 AM    CHOL/HDL Ratio 4.0 10/05/2016 01:42 AM     Lab Results   Component Value Date/Time    Glucose (POC) 130 (H) 12/28/2022 04:10 PM    Glucose (POC) 111 12/28/2022 11:20 AM    Glucose (POC) 130 (H) 12/27/2022 04:16 PM    Glucose (POC) 197 (H) 12/27/2022 11:20 AM     Lab Results   Component Value Date/Time    Color YELLOW/STRAW 12/26/2022 09:42 AM    Appearance CLEAR 12/26/2022 09:42 AM    Specific gravity 1.025 12/26/2022 09:42 AM    Specific gravity 1.010 12/22/2020 10:27 PM    pH (UA) 6.0 12/26/2022 09:42 AM    Protein TRACE (A) 12/26/2022 09:42 AM    Glucose Negative 12/26/2022 09:42 AM    Ketone Negative 12/26/2022 09:42 AM    Bilirubin Negative 12/26/2022 09:42 AM    Urobilinogen 0.2 12/26/2022 09:42 AM    Nitrites Negative 12/26/2022 09:42 AM    Leukocyte Esterase TRACE (A) 12/26/2022 09:42 AM    Epithelial cells FEW 12/26/2022 09:42 AM    Bacteria Negative 12/26/2022 09:42 AM    WBC 5-10 12/26/2022 09:42 AM    RBC 0-5 12/26/2022 09:42 AM         Medications Reviewed:     Current Facility-Administered Medications   Medication Dose Route Frequency    0.9% sodium chloride infusion  125 mL/hr IntraVENous CONTINUOUS    sodium chloride (NS) flush 5-40 mL  5-40 mL IntraVENous Q8H    sodium chloride (NS) flush 5-40 mL  5-40 mL IntraVENous PRN    naloxone (NARCAN) injection 0.4 mg  0.4 mg IntraVENous PRN    calcium-vitamin D (OS-GERMAN +D3) 500 mg-200 unit per tablet 1 Tablet  1 Tablet Oral TID WITH MEALS    senna-docusate (PERICOLACE) 8.6-50 mg per tablet 1 Tablet  1 Tablet Oral BID    [START ON 12/30/2022] bisacodyL (DULCOLAX) suppository 10 mg  10 mg Rectal DAILY PRN    traMADoL (ULTRAM) tablet 50 mg  50 mg Oral Q6H PRN    hydrOXYzine HCL (ATARAX) tablet 10 mg  10 mg Oral Q8H PRN    [START ON 12/29/2022] enoxaparin (LOVENOX) injection 30 mg  30 mg SubCUTAneous DAILY    sodium chloride (NS) flush 5-40 mL  5-40 mL IntraVENous Q8H    sodium chloride (NS) flush 5-40 mL  5-40 mL IntraVENous PRN    sodium chloride (NS) flush 5-40 mL  5-40 mL IntraVENous Q8H    sodium chloride (NS) flush 5-40 mL  5-40 mL IntraVENous PRN    polyethylene glycol (MIRALAX) packet 17 g  17 g Oral DAILY PRN    ondansetron (ZOFRAN ODT) tablet 4 mg  4 mg Oral Q8H PRN    Or    ondansetron (ZOFRAN) injection 4 mg  4 mg IntraVENous Q6H PRN    levothyroxine (SYNTHROID) tablet 112 mcg  112 mcg Oral ACB    acetaminophen (TYLENOL) tablet 650 mg  650 mg Oral Q6H    oxyCODONE IR (ROXICODONE) tablet 5 mg  5 mg Oral Q4H PRN    morphine injection 2 mg  2 mg IntraVENous Q4H PRN    donepeziL (ARICEPT) tablet 10 mg  10 mg Oral QHS    QUEtiapine (SEROquel) tablet 12.5 mg  12.5 mg Oral QHS    traZODone (DESYREL) tablet 50 mg  50 mg Oral QHS PRN     ______________________________________________________________________  EXPECTED LENGTH OF STAY: 3d 4h  ACTUAL LENGTH OF STAY:          2                 Maite Latham NP

## 2022-12-29 NOTE — PROGRESS NOTES
Transition Of Care: SNF referrals pending. The patient is from [facility] and the facility requests she go to rehab before returning. BLS to transport. RUR: 13%    CM spoke with daughter, Crescencio Burger: [phone] and she was on speaker phone with [facility] as they were saying the patient would need to go to rehab prior to returning. CM sent requested referrals to: [address] [place] [address] [address], [address] and [address]. Palmer Andrade stated she would remain the point of contact but her brother, Remington Mason is available if needed. [phone] and his wife, Kash Mack: [phone]. CM following for discharge needs.     Greg Serrano RN/CRM

## 2022-12-30 LAB
ANION GAP SERPL CALC-SCNC: 5 MMOL/L (ref 5–15)
BASOPHILS # BLD: 0.1 K/UL (ref 0–0.1)
BASOPHILS NFR BLD: 1 % (ref 0–1)
BUN SERPL-MCNC: 20 MG/DL (ref 6–20)
BUN/CREAT SERPL: 23 (ref 12–20)
CALCIUM SERPL-MCNC: 9.5 MG/DL (ref 8.5–10.1)
CHLORIDE SERPL-SCNC: 104 MMOL/L (ref 97–108)
CO2 SERPL-SCNC: 28 MMOL/L (ref 21–32)
CREAT SERPL-MCNC: 0.88 MG/DL (ref 0.55–1.02)
DIFFERENTIAL METHOD BLD: ABNORMAL
EOSINOPHIL # BLD: 0.1 K/UL (ref 0–0.4)
EOSINOPHIL NFR BLD: 2 % (ref 0–7)
ERYTHROCYTE [DISTWIDTH] IN BLOOD BY AUTOMATED COUNT: 13.2 % (ref 11.5–14.5)
GLUCOSE SERPL-MCNC: 92 MG/DL (ref 65–100)
HCT VFR BLD AUTO: 27.9 % (ref 35–47)
HGB BLD-MCNC: 8.9 G/DL (ref 11.5–16)
IMM GRANULOCYTES # BLD AUTO: 0 K/UL (ref 0–0.04)
IMM GRANULOCYTES NFR BLD AUTO: 0 % (ref 0–0.5)
LYMPHOCYTES # BLD: 1 K/UL (ref 0.8–3.5)
LYMPHOCYTES NFR BLD: 14 % (ref 12–49)
MAGNESIUM SERPL-MCNC: 1.9 MG/DL (ref 1.6–2.4)
MCH RBC QN AUTO: 30 PG (ref 26–34)
MCHC RBC AUTO-ENTMCNC: 31.9 G/DL (ref 30–36.5)
MCV RBC AUTO: 93.9 FL (ref 80–99)
MONOCYTES # BLD: 0.8 K/UL (ref 0–1)
MONOCYTES NFR BLD: 10 % (ref 5–13)
NEUTS SEG # BLD: 5.5 K/UL (ref 1.8–8)
NEUTS SEG NFR BLD: 73 % (ref 32–75)
NRBC # BLD: 0 K/UL (ref 0–0.01)
NRBC BLD-RTO: 0 PER 100 WBC
PHOSPHATE SERPL-MCNC: 3.2 MG/DL (ref 2.6–4.7)
PLATELET # BLD AUTO: 226 K/UL (ref 150–400)
PMV BLD AUTO: 9.9 FL (ref 8.9–12.9)
POTASSIUM SERPL-SCNC: 4.6 MMOL/L (ref 3.5–5.1)
RBC # BLD AUTO: 2.97 M/UL (ref 3.8–5.2)
SODIUM SERPL-SCNC: 137 MMOL/L (ref 136–145)
WBC # BLD AUTO: 7.5 K/UL (ref 3.6–11)

## 2022-12-30 PROCEDURE — 74011250637 HC RX REV CODE- 250/637: Performed by: ORTHOPAEDIC SURGERY

## 2022-12-30 PROCEDURE — 74011250636 HC RX REV CODE- 250/636

## 2022-12-30 PROCEDURE — 65270000029 HC RM PRIVATE

## 2022-12-30 PROCEDURE — 85025 COMPLETE CBC W/AUTO DIFF WBC: CPT

## 2022-12-30 PROCEDURE — 97530 THERAPEUTIC ACTIVITIES: CPT

## 2022-12-30 PROCEDURE — 80048 BASIC METABOLIC PNL TOTAL CA: CPT

## 2022-12-30 PROCEDURE — 74011250637 HC RX REV CODE- 250/637

## 2022-12-30 PROCEDURE — 74011000250 HC RX REV CODE- 250

## 2022-12-30 PROCEDURE — 83735 ASSAY OF MAGNESIUM: CPT

## 2022-12-30 PROCEDURE — 36415 COLL VENOUS BLD VENIPUNCTURE: CPT

## 2022-12-30 PROCEDURE — 84100 ASSAY OF PHOSPHORUS: CPT

## 2022-12-30 PROCEDURE — 74011000250 HC RX REV CODE- 250: Performed by: ORTHOPAEDIC SURGERY

## 2022-12-30 RX ORDER — ENOXAPARIN SODIUM 100 MG/ML
40 INJECTION SUBCUTANEOUS DAILY
Status: DISCONTINUED | OUTPATIENT
Start: 2022-12-31 | End: 2023-01-02 | Stop reason: HOSPADM

## 2022-12-30 RX ADMIN — MORPHINE SULFATE 2 MG: 2 INJECTION, SOLUTION INTRAMUSCULAR; INTRAVENOUS at 16:36

## 2022-12-30 RX ADMIN — QUETIAPINE FUMARATE 12.5 MG: 25 TABLET ORAL at 22:49

## 2022-12-30 RX ADMIN — ENOXAPARIN SODIUM 30 MG: 100 INJECTION SUBCUTANEOUS at 08:50

## 2022-12-30 RX ADMIN — SENNOSIDES AND DOCUSATE SODIUM 1 TABLET: 50; 8.6 TABLET ORAL at 17:29

## 2022-12-30 RX ADMIN — SENNOSIDES AND DOCUSATE SODIUM 1 TABLET: 50; 8.6 TABLET ORAL at 08:50

## 2022-12-30 RX ADMIN — TRAMADOL HYDROCHLORIDE 50 MG: 50 TABLET, COATED ORAL at 17:32

## 2022-12-30 RX ADMIN — LEVOTHYROXINE SODIUM 112 MCG: 0.11 TABLET ORAL at 07:33

## 2022-12-30 RX ADMIN — CALCIUM CARBONATE-VITAMIN D TAB 500 MG-200 UNIT 1 TABLET: 500-200 TAB at 16:36

## 2022-12-30 RX ADMIN — MORPHINE SULFATE 2 MG: 2 INJECTION, SOLUTION INTRAMUSCULAR; INTRAVENOUS at 10:50

## 2022-12-30 RX ADMIN — ACETAMINOPHEN 650 MG: 325 TABLET ORAL at 23:01

## 2022-12-30 RX ADMIN — TRAMADOL HYDROCHLORIDE 50 MG: 50 TABLET, COATED ORAL at 10:50

## 2022-12-30 RX ADMIN — ACETAMINOPHEN 650 MG: 325 TABLET ORAL at 11:01

## 2022-12-30 RX ADMIN — ACETAMINOPHEN 650 MG: 325 TABLET ORAL at 17:29

## 2022-12-30 RX ADMIN — CALCIUM CARBONATE-VITAMIN D TAB 500 MG-200 UNIT 1 TABLET: 500-200 TAB at 11:01

## 2022-12-30 RX ADMIN — CALCIUM CARBONATE-VITAMIN D TAB 500 MG-200 UNIT 1 TABLET: 500-200 TAB at 08:50

## 2022-12-30 RX ADMIN — SODIUM CHLORIDE, PRESERVATIVE FREE 10 ML: 5 INJECTION INTRAVENOUS at 16:36

## 2022-12-30 RX ADMIN — ACETAMINOPHEN 650 MG: 325 TABLET ORAL at 07:33

## 2022-12-30 RX ADMIN — DONEPEZIL HYDROCHLORIDE 10 MG: 10 TABLET, FILM COATED ORAL at 22:49

## 2022-12-30 RX ADMIN — OXYCODONE 5 MG: 5 TABLET ORAL at 16:36

## 2022-12-30 NOTE — PROGRESS NOTES
Lovenox Monitoring  Indication: DVT Prophylaxis  Recent Labs     12/30/22  0700 12/29/22  0658 12/28/22  0055   HGB 8.9* 8.2* 8.4*    176 173   CREA 0.88 0.94 1.39*     Current Weight: 86.9 kg  Est. CrCl = 44 ml/min  Current Dose: 30 mg subcutaneously every 24 hours.   Plan: Change to 40mg subcutaneously every 24 hours

## 2022-12-30 NOTE — PROGRESS NOTES
Problem: Self Care Deficits Care Plan (Adult)  Goal: *Acute Goals and Plan of Care (Insert Text)  Description: FUNCTIONAL STATUS PRIOR TO ADMISSION: Patient was modified independent using a rolling walker for functional mobility. Patient required minimal assistance for basic and instrumental ADLs. Patient able to complete most ADLs independently, did require assist for showers and IADLs. HOME SUPPORT: Patient stayed at [facility]/memory care, assist for showers and IADLs but otherwise mod independent. Occupational Therapy Goals  Initiated 12/27/2022  1. Patient will perform self-feeding with modified independence within 7 day(s). 2.  Patient will perform lower body dressing with maximal assistance within 7 day(s). 3.  Patient will perform grooming in unsupported sitting with supervision/set-up within 7 day(s). 4.  Patient will perform toilet transfers with moderate assistance  within 7 day(s). 5.  Patient will perform all aspects of toileting with moderate assistance  within 7 day(s). 6.  Patient will participate in upper extremity therapeutic exercise/activities with supervision/set-up for 10 minutes within 7 day(s). 7.  Patient will utilize energy conservation techniques during functional activities with verbal and visual cues within 7 day(s). Outcome: Progressing Towards Goal   OCCUPATIONAL THERAPY TREATMENT  Patient: Deshawn Mcintosh (29 y.o. female)  Date: 12/30/2022  Diagnosis: Hip fracture ([address] Ileana Eisenmenger <principal problem not specified>  Procedure(s) (LRB):  SYNTHES TFN NAIL (Left) 4 Days Post-Op  Precautions:    Chart, occupational therapy assessment, plan of care, and goals were reviewed. ASSESSMENT  Patient continues with skilled OT services and is progressing towards goals. Patient received supine in bed with caregiver at bedside supportive and encouraging throughout session today.  Patient agreeable to session and endorses continued pain in L hip, reoriented patient to time, situation, and place, patient unable to recall despite intermittent reorienting. Patient with good progress and tolerance for mobility today initially Max A to prevent posterior lean but able to progress with additional time, and multimodal cues for forward weight shift/hip flexion to intermittent CGA. Patient tolerated ~10 minutes at EOB participating in BLE exercises with PTA while OT facilitating cues to maintain balance in midline. Patient demo L sided lean as beginning to fatigue unable to self-correct maintain midline sitting needing assistance. Patient able to assist with lateral scooting up to [facility]. Assisted back to bed at end of session NAD, continue to recommend SNF level rehab at discharge to maximize safety and independence prior to return to [facility]. Current Level of Function Impacting Discharge (ADLs): Max Ax2 bed mobility, poor sitting balance    Other factors to consider for discharge: PLOF, needs rehab prior to return to [facility]         PLAN :  Patient continues to benefit from skilled intervention to address the above impairments. Continue treatment per established plan of care to address goals. Recommend with staff: there exer in bed, there act, participate in care as able    Recommend next OT session: Progress POC, trial ADLs sitting EOB with setup? Recommendation for discharge: (in order for the patient to meet his/her long term goals)  Therapy up to 5 days/week in SNF setting -> return to [facility]    This discharge recommendation:  Has been made in collaboration with the attending provider and/or case management    IF patient discharges home will need the following DME: hospital bed and mechanical lift       SUBJECTIVE:   Patient stated I was voted most athletic in high school back in the day.     OBJECTIVE DATA SUMMARY:   Cognitive/Behavioral Status:  Neurologic State: Alert;Confused  Orientation Level: Oriented to person;Disoriented to situation;Disoriented to time;Disoriented to place  Cognition: Follows commands;Decreased attention/concentration;Memory loss;Poor safety awareness  Perception: Appears intact  Perseveration: No perseveration noted  Safety/Judgement: Decreased awareness of environment;Decreased awareness of need for assistance;Decreased awareness of need for safety;Decreased insight into deficits    Functional Mobility and Transfers for ADLs:  Bed Mobility:  Supine to Sit: Moderate assistance;Assist x2  Sit to Supine: Maximum assistance; Moderate assistance;Assist x2  Scooting: Moderate assistance;Assist x2;Bed Modified      Balance:  Sitting: Impaired  Sitting - Static: Fair (occasional)  Sitting - Dynamic: Fair (occasional)    ADL Intervention:       Cognitive Retraining  Safety/Judgement: Decreased awareness of environment;Decreased awareness of need for assistance;Decreased awareness of need for safety;Decreased insight into deficits    Therapeutic Exercises:   Participated in BLE exercises sitting EOB with PTA while OT providing TC to maintain midline sitting    Pain:  Grimacing in pain with hip flexion sitting EOB, states L sided pain    Activity Tolerance:   Fair, SpO2 stable on RA, and requires rest breaks    After treatment patient left in no apparent distress:   Supine in bed, Call bell within reach, Bed / chair alarm activated, Caregiver / family present, and Side rails x 3    COMMUNICATION/COLLABORATION:   The patients plan of care was discussed with: Physical therapy assistant and Registered nurse.      Lily Patel OT  Time Calculation: 24 mins

## 2022-12-30 NOTE — PROGRESS NOTES
Bedside and Verbal shift change report given to Lynsey Gallego RN (oncoming nurse) by Matthew Gleason RN (offgoing nurse). Report included the following information SBAR, Kardex, Procedure Summary, Intake/Output, MAR, Accordion, and Recent Results.

## 2022-12-30 NOTE — PROGRESS NOTES
Problem: Mobility Impaired (Adult and Pediatric)  Goal: *Acute Goals and Plan of Care (Insert Text)  Description: FUNCTIONAL STATUS PRIOR TO ADMISSION: Patient required stand-by assistance for basic and instrumental ADLs and was able to mobilize with walker. HOME SUPPORT PRIOR TO ADMISSION: The patient lived at [facility]. Per family, patient will be able to get full assistance 24/7 care at [facility] unit and therapy sourced in. Physical Therapy Goals  Initiated 12/27/2022    1. Patient will move from supine to sit and sit to supine , scoot up and down, and roll side to side in bed with minimal assistance/contact guard assist within 4 days. 2. Patient will perform sit to stand with minimal assistance/contact guard assist within 4 days. 3. Patient will ambulate with minimal assistance/contact guard assist for 10 feet with the least restrictive device within 4 days. 4. Patient will perform L hip home exercise program per protocol with minimal assistance/contact guard assist within 4 days. Outcome: Progressing Towards Goal   PHYSICAL THERAPY TREATMENT  Patient: Jessica Day (53 y.o. female)  Date: 12/30/2022  Diagnosis: Hip fracture ([address] Peg Alston <principal problem not specified>  Procedure(s) (LRB):  SYNTHES TFN NAIL (Left) 4 Days Post-Op  Precautions:    Chart, physical therapy assessment, plan of care and goals were reviewed. ASSESSMENT  Patient continues with skilled PT services and is progressing towards goals slowly. This session, she was able to increase time spent sitting EOB. Sitting balance also improved and patient able to self correct sitting balance periodically. Noted w/ increased time and onset of fatigue, pt demonstrating increase lean (lateral flexion) to Left. She was able to complete simple EOB seated BLE exercises w/ active-assist of LLE which progressed to active performance w/ minimal ROM.  Pt demonstrated mild scoot to EOB w/ guidance of LE's then returned to supine in bed and positioned to comfort. All needs in reach/met. Pt's caregiver remained at bedside throughout session and at sessions end. Current Level of Function Impacting Discharge (mobility/balance): Mod-Max Ax2. Other factors to consider for discharge: refer to PLOF. Requires Assist x2 for functional transfers. History of fall(s)/ fall risk. PLAN :  Patient continues to benefit from skilled intervention to address the above impairments. Continue treatment per established plan of care. to address goals. Recommendation for discharge: (in order for the patient to meet his/her long term goals)  Therapy up to 5 days/week in SNF setting    This discharge recommendation:  Has been made in collaboration with the attending provider and/or case management    IF patient discharges home will need the following DME: to be determined (TBD)       SUBJECTIVE:   Patient stated I played softball and volleyball in high school.     OBJECTIVE DATA SUMMARY:   Critical Behavior:  Neurologic State: Alert, Confused  Orientation Level: Oriented to person, Disoriented to situation, Disoriented to time, Disoriented to place  Cognition: Follows commands, Decreased attention/concentration, Memory loss, Poor safety awareness  Safety/Judgement: Decreased awareness of environment, Decreased awareness of need for assistance, Decreased awareness of need for safety, Decreased insight into deficits  Functional Mobility Training:  Bed Mobility:     Supine to Sit: Moderate assistance;Assist x2  Sit to Supine: Maximum assistance; Moderate assistance;Assist x2  Scooting:  Moderate assistance;Assist x2;Bed Modified        Transfers:                                   Balance:  Sitting: Impaired  Sitting - Static: Fair (occasional)  Sitting - Dynamic: Fair (occasional)  Ambulation/Gait Training:                    Right Side Weight Bearing: Full  Left Side Weight Bearing: As tolerated    Therapeutic Exercises:   Seated EOB: LAQ's (BLE, active-assist for LLE). Hip flexion (PROM). Pain Rating:  Pt did not quantify. Activity Tolerance:   Fair    After treatment patient left in no apparent distress:   Supine in bed, Heels elevated for pressure relief, Call bell within reach, Bed / chair alarm activated, Caregiver / family present, and Side rails x 3    COMMUNICATION/COLLABORATION:   The patients plan of care was discussed with: Occupational therapist and Registered nurse.      Bri Berkowitz,PTA   Time Calculation: 19 mins

## 2022-12-30 NOTE — PROGRESS NOTES
[facility] Adult  Hospitalist Group                                                                                          Hospitalist Progress Note  Cecelia Huynh NP  Answering service: [phone] OR [phone] from in house phone        Date of Service:  2022  NAME:  Surinder Oglesby  :  1930  MRN:  [id number]      Admission Summary:   Surinder Oglesby is a 80 y.o. female with a PMH of HLD, Hypothyroidism, Renal Cell Carcinoma s/p Left Nephrectomy, KAREN, Osteoarthritis, Rheumatoid Arthritis, Dementia, Cardiomyopathy, HFrEF and Obesity who presented to [facility] from [facility] unit for eval after a GLF on am of presentation, denied head injury, work- up revealed proximal left femur fracture and transferred to [facility] for Orthopedic Service eval, hospitalist service consulted for admit. Interval history / Subjective: Follow-up for issues listed below.   -Patient seen and examined, no c/o's. No acute changes. Stable for discharge. Assessment & Plan:     Fracture Left Proximal Femur:  - XR left hip w/wo : fracture proximal left femur.  - pain management  - Ortho Sx followin/26 s/p Left intertrochanteric hip fracture IM nail.   - PT/OT   - Fall precautions      Cardiomyopathy/HFrEF:   - CXR : Prominent size heart. Minimal vascular prominence. No focal infiltrate. - EKG : SR w/ PAC's. - ECHO MAGEN 2020: EF 40-45%     Hyponatremia: d/c IVF's, monitor lytes. CKD III/Urine retention:   - monitor renal function, renal dose meds, avoid nephrotoxic agents. - UA : negative  - bladder scan and insert purdy cath if urine retention ( straight cath for >400 mL on  per nurse)     AFIB: xarelto at home, held. Consider d/c risk vs benefit given falls. Dementia: supportive care lives in memory care unit, continue home regimen. Hypothyroidism: continue synthroid.    Arthritis (RA and OA): pain control, Oscal.  Hx of renal cell carcinoma: left nephrectomy. KAREN: supplemental O2, no CPAP at home. DVTppx: lovenox  Code Status: DNR  Diet: Cardiac  Activity: OOB to chair TID and PRN  Discharge[facility] SNF  Ambulates: [facility] Problems  Date Reviewed: 1/12/2021            Codes Class Noted POA    Hip fracture [facility]) ICD-10-CM: L09.218E  ICD-9-CM: 820.8  12/26/2022 Unknown             Review of Systems:   A comprehensive review of systems was negative except for that written in the HPI. Vital Signs:    Last 24hrs VS reviewed since prior progress note. Most recent are:  Visit Vitals  BP (!) 143/70 (BP 1 Location: Right upper arm, BP Patient Position: Semi fowlers)   Pulse 68   Temp 98 °F (36.7 °C)   Resp 20   Ht 5' 5\" (1.651 m)   Wt 86.9 kg (191 lb 9.6 oz)   SpO2 90%   BMI 31.88 kg/m²         Intake/Output Summary (Last 24 hours) at 12/30/2022 1241  Last data filed at 12/30/2022 1101  Gross per 24 hour   Intake --   Output 1750 ml   Net -1750 ml        Physical Examination:     I had a face to face encounter with this patient and independently examined them on 12/30/2022 as outlined below:    Constitutional:  No acute distress. ENT:  Oral mucosa moist.    Resp:  CTA bilaterally. No wheezing/rhonchi/rales. No accessory muscle use. CV:  Regular rhythm, normal rate, S1,S2.    GI/:  Soft, slightly distended, non tender, no guarding, BS present. Silvestre patent, clr/yell. Musculoskeletal:  Trace BLE edema, warm. Neurologic:  Moves all extremities. AAOx2. Skin:  w/d. Surgical site dressing: CDI  Psych:  Not anxious nor agitated.        Data Review:    Review and/or order of clinical lab test      Labs:     Recent Labs     12/30/22  0700 12/29/22  0658   WBC 7.5 6.8   HGB 8.9* 8.2*   HCT 27.9* 25.7*    176     Recent Labs     12/30/22  0700 12/29/22  0658 12/28/22  0055    135* 135*   K 4.6 4.7 4.5    103 104   CO2 28 29 25   BUN 20 24* 30*   CREA 0.88 0.94 1.39*   GLU 92 92 170*   CA 9.5 9.1 8.5   MG 1.9 2.0 2.2   PHOS 3.2 2.4* 3.2     No results for input(s): ALT, AP, TBIL, TBILI, TP, ALB, GLOB, GGT, AML, LPSE in the last 72 hours. No lab exists for component: SGOT, GPT, AMYP, HLPSE  No results for input(s): INR, PTP, APTT, INREXT in the last 72 hours. No results for input(s): FE, TIBC, PSAT, FERR in the last 72 hours. No results found for: FOL, RBCF   No results for input(s): PH, PCO2, PO2 in the last 72 hours. No results for input(s): CPK, CKNDX, TROIQ in the last 72 hours.     No lab exists for component: CPKMB  Lab Results   Component Value Date/Time    Cholesterol, total 194 10/05/2016 01:42 AM    HDL Cholesterol 48 10/05/2016 01:42 AM    LDL, calculated 117.2 (H) 10/05/2016 01:42 AM    Triglyceride 144 10/05/2016 01:42 AM    CHOL/HDL Ratio 4.0 10/05/2016 01:42 AM     Lab Results   Component Value Date/Time    Glucose (POC) 139 (H) 12/29/2022 05:06 PM    Glucose (POC) 94 12/29/2022 10:46 AM    Glucose (POC) 130 (H) 12/28/2022 04:10 PM    Glucose (POC) 111 12/28/2022 11:20 AM    Glucose (POC) 130 (H) 12/27/2022 04:16 PM     Lab Results   Component Value Date/Time    Color YELLOW/STRAW 12/26/2022 09:42 AM    Appearance CLEAR 12/26/2022 09:42 AM    Specific gravity 1.025 12/26/2022 09:42 AM    Specific gravity 1.010 12/22/2020 10:27 PM    pH (UA) 6.0 12/26/2022 09:42 AM    Protein TRACE (A) 12/26/2022 09:42 AM    Glucose Negative 12/26/2022 09:42 AM    Ketone Negative 12/26/2022 09:42 AM    Bilirubin Negative 12/26/2022 09:42 AM    Urobilinogen 0.2 12/26/2022 09:42 AM    Nitrites Negative 12/26/2022 09:42 AM    Leukocyte Esterase TRACE (A) 12/26/2022 09:42 AM    Epithelial cells FEW 12/26/2022 09:42 AM    Bacteria Negative 12/26/2022 09:42 AM    WBC 5-10 12/26/2022 09:42 AM    RBC 0-5 12/26/2022 09:42 AM         Medications Reviewed:     Current Facility-Administered Medications   Medication Dose Route Frequency    sodium chloride (NS) flush 5-40 mL  5-40 mL IntraVENous Q8H    sodium chloride (NS) flush 5-40 mL  5-40 mL IntraVENous PRN    naloxone (NARCAN) injection 0.4 mg  0.4 mg IntraVENous PRN    calcium-vitamin D (OS-GERMAN +D3) 500 mg-200 unit per tablet 1 Tablet  1 Tablet Oral TID WITH MEALS    senna-docusate (PERICOLACE) 8.6-50 mg per tablet 1 Tablet  1 Tablet Oral BID    bisacodyL (DULCOLAX) suppository 10 mg  10 mg Rectal DAILY PRN    traMADoL (ULTRAM) tablet 50 mg  50 mg Oral Q6H PRN    hydrOXYzine HCL (ATARAX) tablet 10 mg  10 mg Oral Q8H PRN    enoxaparin (LOVENOX) injection 30 mg  30 mg SubCUTAneous DAILY    sodium chloride (NS) flush 5-40 mL  5-40 mL IntraVENous Q8H    sodium chloride (NS) flush 5-40 mL  5-40 mL IntraVENous PRN    sodium chloride (NS) flush 5-40 mL  5-40 mL IntraVENous Q8H    sodium chloride (NS) flush 5-40 mL  5-40 mL IntraVENous PRN    polyethylene glycol (MIRALAX) packet 17 g  17 g Oral DAILY PRN    ondansetron (ZOFRAN ODT) tablet 4 mg  4 mg Oral Q8H PRN    Or    ondansetron (ZOFRAN) injection 4 mg  4 mg IntraVENous Q6H PRN    levothyroxine (SYNTHROID) tablet 112 mcg  112 mcg Oral ACB    acetaminophen (TYLENOL) tablet 650 mg  650 mg Oral Q6H    oxyCODONE IR (ROXICODONE) tablet 5 mg  5 mg Oral Q4H PRN    morphine injection 2 mg  2 mg IntraVENous Q4H PRN    donepeziL (ARICEPT) tablet 10 mg  10 mg Oral QHS    QUEtiapine (SEROquel) tablet 12.5 mg  12.5 mg Oral QHS    traZODone (DESYREL) tablet 50 mg  50 mg Oral QHS PRN     ______________________________________________________________________  EXPECTED LENGTH OF STAY: 3d 4h  ACTUAL LENGTH OF STAY:          4                 Maite Latham NP

## 2022-12-30 NOTE — PROGRESS NOTES
Transition Of Care: CM awaiting more SNF choices. Referrals sent yesterday all declined. CM spoke with Ronn Vang ([phone]) and CM sent more referrals today. BLS to transport. No [facility] will be required. RUR: 12%    SNF's declined: [facility]. Pending: [facility] and [facility]. CM following.     Abraham Mcgovern RN/CRM

## 2022-12-30 NOTE — PROGRESS NOTES
Problem: Pressure Injury - Risk of  Goal: *Prevention of pressure injury  Description: Document Eric Scale and appropriate interventions in the flowsheet. Outcome: Progressing Towards Goal  Note: Pressure Injury Interventions:  Sensory Interventions: Check visual cues for pain, Float heels, Minimize linen layers, Pad between skin to skin    Moisture Interventions: Absorbent underpads, Contain wound drainage, Internal/External urinary devices, Internal/External fecal devices, Limit adult briefs, Minimize layers    Activity Interventions: Increase time out of bed, Pressure redistribution bed/mattress(bed type), PT/OT evaluation    Mobility Interventions: HOB 30 degrees or less, PT/OT evaluation, Pressure redistribution bed/mattress (bed type)    Nutrition Interventions: Offer support with meals,snacks and hydration, Document food/fluid/supplement intake    Friction and Shear Interventions: HOB 30 degrees or less, Lift sheet, Minimize layers, Transferring/repositioning devices                Problem: Falls - Risk of  Goal: *Absence of Falls  Description: Document Steven Fall Risk and appropriate interventions in the flowsheet.   Outcome: Progressing Towards Goal  Note: Fall Risk Interventions:  Mobility Interventions: Communicate number of staff needed for ambulation/transfer, Bed/chair exit alarm, OT consult for ADLs    Mentation Interventions: Adequate sleep, hydration, pain control, Bed/chair exit alarm, Evaluate medications/consider consulting pharmacy, Reorient patient, Room close to nurse's station, Toileting rounds, Update white board    Medication Interventions: Evaluate medications/consider consulting pharmacy, Patient to call before getting OOB, Teach patient to arise slowly    Elimination Interventions: Call light in reach, Patient to call for help with toileting needs, Stay With Me (per policy), Toilet paper/wipes in reach, Toileting schedule/hourly rounds    History of Falls Interventions: Door open when patient unattended, Evaluate medications/consider consulting pharmacy, Room close to nurse's station, Utilize gait belt for transfer/ambulation

## 2022-12-31 LAB
MAGNESIUM SERPL-MCNC: 2.1 MG/DL (ref 1.6–2.4)
PHOSPHATE SERPL-MCNC: 3.9 MG/DL (ref 2.6–4.7)

## 2022-12-31 PROCEDURE — 74011250637 HC RX REV CODE- 250/637

## 2022-12-31 PROCEDURE — 84100 ASSAY OF PHOSPHORUS: CPT

## 2022-12-31 PROCEDURE — 74011000250 HC RX REV CODE- 250

## 2022-12-31 PROCEDURE — 74011250637 HC RX REV CODE- 250/637: Performed by: ORTHOPAEDIC SURGERY

## 2022-12-31 PROCEDURE — 36415 COLL VENOUS BLD VENIPUNCTURE: CPT

## 2022-12-31 PROCEDURE — 74011250636 HC RX REV CODE- 250/636

## 2022-12-31 PROCEDURE — 83735 ASSAY OF MAGNESIUM: CPT

## 2022-12-31 PROCEDURE — 65270000029 HC RM PRIVATE

## 2022-12-31 PROCEDURE — 74011000250 HC RX REV CODE- 250: Performed by: ORTHOPAEDIC SURGERY

## 2022-12-31 PROCEDURE — 97530 THERAPEUTIC ACTIVITIES: CPT

## 2022-12-31 PROCEDURE — 74011000250 HC RX REV CODE- 250: Performed by: EMERGENCY MEDICINE

## 2022-12-31 RX ADMIN — TRAMADOL HYDROCHLORIDE 50 MG: 50 TABLET, COATED ORAL at 00:06

## 2022-12-31 RX ADMIN — SENNOSIDES AND DOCUSATE SODIUM 1 TABLET: 50; 8.6 TABLET ORAL at 17:22

## 2022-12-31 RX ADMIN — SODIUM CHLORIDE, PRESERVATIVE FREE 10 ML: 5 INJECTION INTRAVENOUS at 06:00

## 2022-12-31 RX ADMIN — CALCIUM CARBONATE-VITAMIN D TAB 500 MG-200 UNIT 1 TABLET: 500-200 TAB at 11:59

## 2022-12-31 RX ADMIN — ACETAMINOPHEN 650 MG: 325 TABLET ORAL at 17:22

## 2022-12-31 RX ADMIN — ENOXAPARIN SODIUM 40 MG: 100 INJECTION SUBCUTANEOUS at 10:23

## 2022-12-31 RX ADMIN — SENNOSIDES AND DOCUSATE SODIUM 1 TABLET: 50; 8.6 TABLET ORAL at 09:33

## 2022-12-31 RX ADMIN — SODIUM CHLORIDE, PRESERVATIVE FREE 10 ML: 5 INJECTION INTRAVENOUS at 13:51

## 2022-12-31 RX ADMIN — SODIUM CHLORIDE, PRESERVATIVE FREE 10 ML: 5 INJECTION INTRAVENOUS at 13:52

## 2022-12-31 RX ADMIN — DONEPEZIL HYDROCHLORIDE 10 MG: 10 TABLET, FILM COATED ORAL at 21:45

## 2022-12-31 RX ADMIN — ACETAMINOPHEN 650 MG: 325 TABLET ORAL at 11:59

## 2022-12-31 RX ADMIN — SODIUM CHLORIDE, PRESERVATIVE FREE 10 ML: 5 INJECTION INTRAVENOUS at 21:45

## 2022-12-31 RX ADMIN — CALCIUM CARBONATE-VITAMIN D TAB 500 MG-200 UNIT 1 TABLET: 500-200 TAB at 09:33

## 2022-12-31 RX ADMIN — QUETIAPINE FUMARATE 12.5 MG: 25 TABLET ORAL at 21:45

## 2022-12-31 RX ADMIN — TRAZODONE HYDROCHLORIDE 50 MG: 50 TABLET ORAL at 21:45

## 2022-12-31 RX ADMIN — LEVOTHYROXINE SODIUM 112 MCG: 0.11 TABLET ORAL at 07:32

## 2022-12-31 RX ADMIN — CALCIUM CARBONATE-VITAMIN D TAB 500 MG-200 UNIT 1 TABLET: 500-200 TAB at 17:22

## 2022-12-31 RX ADMIN — SODIUM CHLORIDE, PRESERVATIVE FREE 10 ML: 5 INJECTION INTRAVENOUS at 00:02

## 2022-12-31 RX ADMIN — ACETAMINOPHEN 650 MG: 325 TABLET ORAL at 08:00

## 2022-12-31 RX ADMIN — TRAMADOL HYDROCHLORIDE 50 MG: 50 TABLET, COATED ORAL at 09:33

## 2022-12-31 NOTE — PROGRESS NOTES
Problem: Pressure Injury - Risk of  Goal: *Prevention of pressure injury  Description: Document Eric Scale and appropriate interventions in the flowsheet.   Outcome: Progressing Towards Goal  Note: Pressure Injury Interventions:  Sensory Interventions: Check visual cues for pain, Float heels, Minimize linen layers, Pad between skin to skin    Moisture Interventions: Absorbent underpads, Contain wound drainage, Internal/External urinary devices, Internal/External fecal devices, Limit adult briefs, Minimize layers    Activity Interventions: Increase time out of bed, Pressure redistribution bed/mattress(bed type), PT/OT evaluation    Mobility Interventions: HOB 30 degrees or less, PT/OT evaluation, Pressure redistribution bed/mattress (bed type)    Nutrition Interventions: Offer support with meals,snacks and hydration, Document food/fluid/supplement intake    Friction and Shear Interventions: HOB 30 degrees or less, Lift sheet, Minimize layers, Transferring/repositioning devices                Problem: Patient Education: Go to Patient Education Activity  Goal: Patient/Family Education  Outcome: Progressing Towards Goal  Note: Fall Prevention  Pain management

## 2022-12-31 NOTE — PROGRESS NOTES
[facility] Adult  Hospitalist Group                                                                                          Hospitalist Progress Note  Morgan Mcnally NP  Answering service: [phone] or [phone] from in house phone        Date of Service:  2022  NAME:  Hanna Bonilla  :  1930  MRN:  [id number]      Admission Summary:   Hanna Bonilla is a 80 y.o. female with a PMH of HLD, Hypothyroidism, Renal Cell Carcinoma s/p Left Nephrectomy, KAREN, Osteoarthritis, Rheumatoid Arthritis, Dementia, Cardiomyopathy, HFrEF and Obesity who presented to [facility] from [facility] unit for eval after a GLF on am of presentation, denied head injury, work- up revealed proximal left femur fracture and transferred to [facility] for Orthopedic Service eval, hospitalist service consulted for admit. Interval history / Subjective: Follow-up for issues listed below.   -Patient seen and examined, no c/o's. Assessment & Plan:     Fracture Left Proximal Femur:  - XR left hip w/wo : fracture proximal left femur.  - pain management  - Ortho Sx followin/26 s/p Left intertrochanteric hip fracture IM nail.   - PT/OT   - Fall precautions      Cardiomyopathy/HFrEF:   - CXR : Prominent size heart. Minimal vascular prominence. No focal infiltrate. - EKG : SR w/ PAC's. - ECHO MAGEN 2020: EF 40-45%     Hyponatremia: d/c IVF's, monitor lytes. CKD III/Urine retention:   - monitor renal function, renal dose meds, avoid nephrotoxic agents. - UA : negative  - bladder scan and insert purdy cath if urine retention ( straight cath for >400 mL on  per nurse)     AFIB: xarelto at home, held. Consider d/c risk vs benefit given falls. Dementia: supportive care lives in memory care unit, continue home regimen. Hypothyroidism: continue synthroid. Arthritis (RA and OA): pain control, Oscal.  Hx of renal cell carcinoma: left nephrectomy.    KAREN: supplemental O2, no CPAP at home. DVTppx: lovenox  Code Status: DNR  Diet: Cardiac  Activity: OOB to chair TID and PRN  Discharge: return to [facility]URY vs SNF  Ambulates: walker     Hospital Problems  Date Reviewed: 1/12/2021            Codes Class Noted POA    Hip fracture [facility]) ICD-10-CM: L95.760D  ICD-9-CM: 820.8  12/26/2022 Unknown             Review of Systems:   A comprehensive review of systems was negative except for that written in the HPI. Vital Signs:    Last 24hrs VS reviewed since prior progress note. Most recent are:  Visit Vitals  /64 (BP 1 Location: Left upper arm, BP Patient Position: At rest)   Pulse 72   Temp 99.3 °F (37.4 °C)   Resp 18   Ht 5' 5\" (1.651 m)   Wt 77.1 kg (169 lb 14.4 oz)   SpO2 96%   BMI 28.27 kg/m²         Intake/Output Summary (Last 24 hours) at 12/31/2022 1226  Last data filed at 12/31/2022 0600  Gross per 24 hour   Intake --   Output 1650 ml   Net -1650 ml        Physical Examination:     I had a face to face encounter with this patient and independently examined them on 12/31/2022 as outlined below:    Constitutional:  No acute distress. ENT:  Oral mucosa moist.    Resp:  CTA bilaterally. No wheezing/rhonchi/rales. No accessory muscle use. CV:  Regular rhythm, normal rate, S1,S2.    GI/:  Soft, slightly distended, non tender, no guarding, BS present. Silvestre patent, clr/yell. Musculoskeletal:  Trace BLE edema, warm. Neurologic:  Moves all extremities. AAOx2. Skin:  w/d. Surgical site dressing: CDI  Psych:  Not anxious nor agitated.             Data Review:    Review and/or order of clinical lab test      Labs:     Recent Labs     12/30/22  0700 12/29/22  0658   WBC 7.5 6.8   HGB 8.9* 8.2*   HCT 27.9* 25.7*    176     Recent Labs     12/31/22  0858 12/30/22  0700 12/29/22  0658   NA  --  137 135*   K  --  4.6 4.7   CL  --  104 103   CO2  --  28 29   BUN  --  20 24*   CREA  --  0.88 0.94   GLU  --  92 92   CA  --  9.5 9.1   MG 2.1 1.9 2.0   PHOS 3.9 3.2 2.4* No results for input(s): ALT, AP, TBIL, TBILI, TP, ALB, GLOB, GGT, AML, LPSE in the last 72 hours. No lab exists for component: SGOT, GPT, AMYP, HLPSE  No results for input(s): INR, PTP, APTT, INREXT in the last 72 hours. No results for input(s): FE, TIBC, PSAT, FERR in the last 72 hours. No results found for: FOL, RBCF   No results for input(s): PH, PCO2, PO2 in the last 72 hours. No results for input(s): CPK, CKNDX, TROIQ in the last 72 hours.     No lab exists for component: CPKMB  Lab Results   Component Value Date/Time    Cholesterol, total 194 10/05/2016 01:42 AM    HDL Cholesterol 48 10/05/2016 01:42 AM    LDL, calculated 117.2 (H) 10/05/2016 01:42 AM    Triglyceride 144 10/05/2016 01:42 AM    CHOL/HDL Ratio 4.0 10/05/2016 01:42 AM     Lab Results   Component Value Date/Time    Glucose (POC) 139 (H) 12/29/2022 05:06 PM    Glucose (POC) 94 12/29/2022 10:46 AM    Glucose (POC) 130 (H) 12/28/2022 04:10 PM    Glucose (POC) 111 12/28/2022 11:20 AM    Glucose (POC) 130 (H) 12/27/2022 04:16 PM     Lab Results   Component Value Date/Time    Color YELLOW/STRAW 12/26/2022 09:42 AM    Appearance CLEAR 12/26/2022 09:42 AM    Specific gravity 1.025 12/26/2022 09:42 AM    Specific gravity 1.010 12/22/2020 10:27 PM    pH (UA) 6.0 12/26/2022 09:42 AM    Protein TRACE (A) 12/26/2022 09:42 AM    Glucose Negative 12/26/2022 09:42 AM    Ketone Negative 12/26/2022 09:42 AM    Bilirubin Negative 12/26/2022 09:42 AM    Urobilinogen 0.2 12/26/2022 09:42 AM    Nitrites Negative 12/26/2022 09:42 AM    Leukocyte Esterase TRACE (A) 12/26/2022 09:42 AM    Epithelial cells FEW 12/26/2022 09:42 AM    Bacteria Negative 12/26/2022 09:42 AM    WBC 5-10 12/26/2022 09:42 AM    RBC 0-5 12/26/2022 09:42 AM         Medications Reviewed:     Current Facility-Administered Medications   Medication Dose Route Frequency    enoxaparin (LOVENOX) injection 40 mg  40 mg SubCUTAneous DAILY    sodium chloride (NS) flush 5-40 mL  5-40 mL IntraVENous Q8H    sodium chloride (NS) flush 5-40 mL  5-40 mL IntraVENous PRN    naloxone (NARCAN) injection 0.4 mg  0.4 mg IntraVENous PRN    calcium-vitamin D (OS-GERMAN +D3) 500 mg-200 unit per tablet 1 Tablet  1 Tablet Oral TID WITH MEALS    senna-docusate (PERICOLACE) 8.6-50 mg per tablet 1 Tablet  1 Tablet Oral BID    bisacodyL (DULCOLAX) suppository 10 mg  10 mg Rectal DAILY PRN    traMADoL (ULTRAM) tablet 50 mg  50 mg Oral Q6H PRN    hydrOXYzine HCL (ATARAX) tablet 10 mg  10 mg Oral Q8H PRN    sodium chloride (NS) flush 5-40 mL  5-40 mL IntraVENous Q8H    sodium chloride (NS) flush 5-40 mL  5-40 mL IntraVENous PRN    sodium chloride (NS) flush 5-40 mL  5-40 mL IntraVENous Q8H    sodium chloride (NS) flush 5-40 mL  5-40 mL IntraVENous PRN    polyethylene glycol (MIRALAX) packet 17 g  17 g Oral DAILY PRN    ondansetron (ZOFRAN ODT) tablet 4 mg  4 mg Oral Q8H PRN    Or    ondansetron (ZOFRAN) injection 4 mg  4 mg IntraVENous Q6H PRN    levothyroxine (SYNTHROID) tablet 112 mcg  112 mcg Oral ACB    acetaminophen (TYLENOL) tablet 650 mg  650 mg Oral Q6H    oxyCODONE IR (ROXICODONE) tablet 5 mg  5 mg Oral Q4H PRN    morphine injection 2 mg  2 mg IntraVENous Q4H PRN    donepeziL (ARICEPT) tablet 10 mg  10 mg Oral QHS    QUEtiapine (SEROquel) tablet 12.5 mg  12.5 mg Oral QHS    traZODone (DESYREL) tablet 50 mg  50 mg Oral QHS PRN     ______________________________________________________________________  EXPECTED LENGTH OF STAY: 3d 4h  ACTUAL LENGTH OF STAY:          5                 Maite Latham NP

## 2022-12-31 NOTE — PROGRESS NOTES
Problem: Mobility Impaired (Adult and Pediatric)  Goal: *Acute Goals and Plan of Care (Insert Text)  Description: FUNCTIONAL STATUS PRIOR TO ADMISSION: Patient required stand-by assistance for basic and instrumental ADLs and was able to mobilize with walker. HOME SUPPORT PRIOR TO ADMISSION: The patient lived at [facility]. Per family, patient will be able to get full assistance 24/7 care at memory unit and therapy sourced in. Physical Therapy Goals  Initiated 12/27/2022    1. Patient will move from supine to sit and sit to supine , scoot up and down, and roll side to side in bed with minimal assistance/contact guard assist within 4 days. 2. Patient will perform sit to stand with minimal assistance/contact guard assist within 4 days. 3. Patient will ambulate with minimal assistance/contact guard assist for 10 feet with the least restrictive device within 4 days. 4. Patient will perform L hip home exercise program per protocol with minimal assistance/contact guard assist within 4 days. Outcome: Not Progressing Towards Goal   PHYSICAL THERAPY TREATMENT  Patient: Hanna Bonilla (12 y.o. female)  Date: 12/31/2022  Diagnosis: Hip fracture ([address] Vanessa Courser <principal problem not specified>  Procedure(s) (LRB):  SYNTHES TFN NAIL (Left) 5 Days Post-Op  Precautions:  fall, dementia, WBAT  Chart, physical therapy assessment, plan of care and goals were reviewed. ASSESSMENT  Patient continues with skilled PT services and is not progressing towards goals. Pt resting in bed and upon awaking with verbal and tactile stimuli, pt states \"I don't want to live anymore. Sapna Tita Sapna Tita \" Pt initially refusing therex or mobility stating \"I can't move. .\" but then agreeable with introduction to PT technician stating \"Well, we can try. Sapna Tita Sapna Tita \" Pt dependently assisted from supine to seated on EOB (left) via assist x2 and LLE maintained on pillow support. Pt required total posterior support and ADLs attempted (eating/drinking from meal tray). This was unsuccessful and pt started to spit her iced tea out from attempts at swallowing. Pt also deferred her dessert. Pt assisted back to supine and left in NAD with all needs met. Pt not following commands for bed therex at this time. Ms Zee Braun remains appropriate for SNF once medically stable. Current Level of Function Impacting Discharge (mobility/balance): upwards max-total A x2    Other factors to consider for discharge: pt with baseline cognitive deficits and difficulty following commands/carrying over education, etc... PLAN :  Patient continues to benefit from skilled intervention to address the above impairments. Continue treatment per established plan of care. to address goals. Recommendation for discharge: (in order for the patient to meet his/her long term goals)  Therapy up to 5 days/week in SNF setting    This discharge recommendation:  Has been made in collaboration with the attending provider and/or case management    IF patient discharges home will need the following DME: facility is recommended        SUBJECTIVE:   Patient stated Ayo Ross you seen my brother? Does he know where I am? 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 .. I was very nice to those kids. .. you are a good person. ..    OBJECTIVE DATA SUMMARY:   Critical Behavior:  Neurologic State: Alert, Confused  Orientation Level: Oriented to person, Disoriented to situation, Disoriented to place, Disoriented to time  Cognition: Follows commands, Impaired decision making  Safety/Judgement: Decreased awareness of environment, Decreased awareness of need for assistance, Decreased awareness of need for safety, Decreased insight into deficits  Functional Mobility Training:  Bed Mobility:  Supine to Sit: Total assistance;Assist x2  Sit to Supine: Total assistance;Assist x2  Scooting: Maximum assistance; Additional time;Assist x2  Balance:  Sitting: Impaired  Sitting - Static: Poor (constant support)  Sitting - Dynamic: Poor (constant support)  Standing:  (NT)    Therapeutic Exercises:   Unable to perform  Pain Rating:  NT- increased L hip pain noted with movement    Activity Tolerance:   Poor    After treatment patient left in no apparent distress:   Supine in bed, Heels elevated for pressure relief, Call bell within reach, Bed / chair alarm activated, and Side rails x 3    COMMUNICATION/COLLABORATION:   The patients plan of care was discussed with: Registered nurse.      Sara Byrnes   Time Calculation: 18 mins

## 2023-01-01 LAB
MAGNESIUM SERPL-MCNC: 1.9 MG/DL (ref 1.6–2.4)
PHOSPHATE SERPL-MCNC: 3.3 MG/DL (ref 2.6–4.7)

## 2023-01-01 PROCEDURE — 77030005513 HC CATH URETH FOL11 MDII -B

## 2023-01-01 PROCEDURE — 74011250637 HC RX REV CODE- 250/637: Performed by: ORTHOPAEDIC SURGERY

## 2023-01-01 PROCEDURE — 83735 ASSAY OF MAGNESIUM: CPT

## 2023-01-01 PROCEDURE — 36415 COLL VENOUS BLD VENIPUNCTURE: CPT

## 2023-01-01 PROCEDURE — 74011000250 HC RX REV CODE- 250

## 2023-01-01 PROCEDURE — 74011000250 HC RX REV CODE- 250: Performed by: EMERGENCY MEDICINE

## 2023-01-01 PROCEDURE — 51798 US URINE CAPACITY MEASURE: CPT

## 2023-01-01 PROCEDURE — 74011250636 HC RX REV CODE- 250/636

## 2023-01-01 PROCEDURE — 65270000029 HC RM PRIVATE

## 2023-01-01 PROCEDURE — 84100 ASSAY OF PHOSPHORUS: CPT

## 2023-01-01 PROCEDURE — 74011000250 HC RX REV CODE- 250: Performed by: ORTHOPAEDIC SURGERY

## 2023-01-01 PROCEDURE — 74011250637 HC RX REV CODE- 250/637

## 2023-01-01 RX ORDER — OXYCODONE HYDROCHLORIDE 5 MG/1
2.5 TABLET ORAL
Status: DISCONTINUED | OUTPATIENT
Start: 2023-01-01 | End: 2023-01-02 | Stop reason: HOSPADM

## 2023-01-01 RX ORDER — MORPHINE SULFATE 2 MG/ML
1 INJECTION, SOLUTION INTRAMUSCULAR; INTRAVENOUS
Status: DISCONTINUED | OUTPATIENT
Start: 2023-01-01 | End: 2023-01-02 | Stop reason: HOSPADM

## 2023-01-01 RX ORDER — NALOXONE HYDROCHLORIDE 0.4 MG/ML
0.4 INJECTION, SOLUTION INTRAMUSCULAR; INTRAVENOUS; SUBCUTANEOUS
Status: DISCONTINUED | OUTPATIENT
Start: 2023-01-01 | End: 2023-01-01 | Stop reason: SDUPTHER

## 2023-01-01 RX ADMIN — ENOXAPARIN SODIUM 40 MG: 100 INJECTION SUBCUTANEOUS at 10:26

## 2023-01-01 RX ADMIN — ACETAMINOPHEN 650 MG: 325 TABLET ORAL at 00:00

## 2023-01-01 RX ADMIN — SODIUM CHLORIDE, PRESERVATIVE FREE 10 ML: 5 INJECTION INTRAVENOUS at 14:00

## 2023-01-01 RX ADMIN — CALCIUM CARBONATE-VITAMIN D TAB 500 MG-200 UNIT 1 TABLET: 500-200 TAB at 18:28

## 2023-01-01 RX ADMIN — SENNOSIDES AND DOCUSATE SODIUM 1 TABLET: 50; 8.6 TABLET ORAL at 18:29

## 2023-01-01 RX ADMIN — SENNOSIDES AND DOCUSATE SODIUM 1 TABLET: 50; 8.6 TABLET ORAL at 10:26

## 2023-01-01 RX ADMIN — DONEPEZIL HYDROCHLORIDE 10 MG: 10 TABLET, FILM COATED ORAL at 22:49

## 2023-01-01 RX ADMIN — QUETIAPINE FUMARATE 12.5 MG: 25 TABLET ORAL at 22:49

## 2023-01-01 RX ADMIN — ACETAMINOPHEN 650 MG: 325 TABLET ORAL at 18:28

## 2023-01-01 RX ADMIN — TRAZODONE HYDROCHLORIDE 50 MG: 50 TABLET ORAL at 22:49

## 2023-01-01 RX ADMIN — ACETAMINOPHEN 650 MG: 325 TABLET ORAL at 06:41

## 2023-01-01 RX ADMIN — LEVOTHYROXINE SODIUM 112 MCG: 0.11 TABLET ORAL at 06:41

## 2023-01-01 RX ADMIN — CALCIUM CARBONATE-VITAMIN D TAB 500 MG-200 UNIT 1 TABLET: 500-200 TAB at 11:41

## 2023-01-01 RX ADMIN — TRAMADOL HYDROCHLORIDE 50 MG: 50 TABLET, COATED ORAL at 14:42

## 2023-01-01 RX ADMIN — CALCIUM CARBONATE-VITAMIN D TAB 500 MG-200 UNIT 1 TABLET: 500-200 TAB at 10:26

## 2023-01-01 RX ADMIN — ACETAMINOPHEN 650 MG: 325 TABLET ORAL at 11:41

## 2023-01-01 NOTE — CONSENT
Patient still confused, which is her baseline from the memory unit facility she resides. Patient pulled out purdy  this evening, hospitalist notified. Bladder scan will keep monitoring output pure wick in use.

## 2023-01-01 NOTE — PROGRESS NOTES
Transition Of Care: Patient has been accepted at [facility]. Referral still pending with Michael Iniguez. Patient is not medically stable for discharge today per attending, patient may be ready for discharge tomorrow. No Alice Ezekiel will be required. BLS transport at discharge. 2nd IMM has been delivered. Contact is daughter, Alhaji Dumont ([phone])     Chart reviewed. Noted [address] [facility], [facility] [address] have all denied. CM called [facility] and spoke with Guadalupe Orourke #[phone]. They have accepted, and she is checking their bed availability and will call this CM back. CM spoke with Padma Parks at [facility], [address] [place] [address]. They have a semi private room available today. CM spoke with the daughter Alhaji Dumont and confirmed family in agreement with discharge to [place]. Alhaji Dumont stated that her brother  Lakeisha Meza #[phone] should be at bedside. CM sent perfectserve to attending. Per attending patient is not ready for discharge today, but may be tomorrow. CM updated daughter and Padma Parks at [facility]. Medicare pt has received, reviewed, and signed 2nd IM letter informing them of their right to appeal the discharge. Signed copy has been placed on pt bedside chart.       Trisha Jernigan, BSW/CRM

## 2023-01-01 NOTE — PROGRESS NOTES
Problem: Falls - Risk of  Goal: *Absence of Falls  Description: Document Peter Bowser Fall Risk and appropriate interventions in the flowsheet.   Outcome: Progressing Towards Goal  Note: Fall Risk Interventions:  Mobility Interventions: Bed/chair exit alarm    Mentation Interventions: Door open when patient unattended    Medication Interventions: Teach patient to arise slowly    Elimination Interventions: Call light in reach    History of Falls Interventions: Door open when patient unattended

## 2023-01-01 NOTE — PROGRESS NOTES
Physical Therapy  Chart reviewed and cleared for mobility per RN, came to see pt however she is sleeping and did not arouse, pt unable to participate with therapy at this time, will continue to follow per POC  Radha Frankel Service PT

## 2023-01-01 NOTE — PROGRESS NOTES
[facility] Adult  Hospitalist Group                                                                                          Hospitalist Progress Note  Rohini Brooks MD covering for 2023 only  Answering service: [phone] OR [phone] from in house phone        Date of Service:  2023  NAME:  Thania Escoto  :  1930  MRN:  [id number]      Admission Summary:   Summary excerpt: \"--Rene Morales is a 80 y.o. female with a PMH of HLD, Hypothyroidism, Renal Cell Carcinoma s/p Left Nephrectomy, KAREN, Osteoarthritis, Rheumatoid Arthritis, Dementia, Cardiomyopathy, HFrEF and Obesity who presented to [facility] from [facility] unit for eval after a GLF on am of presentation, denied head injury, work- up revealed proximal left femur fracture and transferred to [facility] for Orthopedic Service eval, hospitalist service consulted for admit. \"     Interval history / Subjective:   Resting, wakes but confused/mildly agitated but immediately calms down without any medication intervention      Assessment & Plan:     Fracture Left Proximal Femur:   s/p Left intertrochanteric hip fracture IM nail. Likely be snf versus rehab     Hx of cardiomyopathy  Appears euvolemic  No cp  monitor      Hyponatremia previously noted in  NP hospitalist note  Most recent labs na appears okay. Check labs to trend     CKD III/Urine retention:    NP hospitalist note: \"- UA : negative  -bladder scan and insert purdy cath if urine retention ( straight cath for >400 mL on  per nurse)\"  Monitor.       AFIB hx in setting of falls and recent surgery  Risk and benefit of AC, ortho guiding dvt prophylaxis    Dementia hx  Hypothyroidism, replacement  Arthritis (RA and OA) jx  Hx of renal cell carcinoma and unilateral nephrectomy  KAREN hx, no cpap at home, supplemental O2            DVTppx: lovenox  Code Status: DNR noted in epic  Dispo: anticipate snf     Hospital Problems  Date Reviewed: 2021 Codes Class Noted POA    Hip fracture [facility]) ICD-10-CM: N79.736C  ICD-9-CM: 820.8  12/26/2022 Unknown           Review of Systems:   A comprehensive review of systems was negative except for that written in the HPI. Vital Signs:    Last 24hrs VS reviewed since prior progress note. Most recent are:  Visit Vitals  /64 (BP 1 Location: Right upper arm)   Pulse 66   Temp 97.4 °F (36.3 °C)   Resp 18   Ht 5' 5\" (1.651 m)   Wt 77.1 kg (170 lb)   SpO2 97%   BMI 28.29 kg/m²         Intake/Output Summary (Last 24 hours) at 1/1/2023 1108  Last data filed at 1/1/2023 1025  Gross per 24 hour   Intake --   Output 900 ml   Net -900 ml        Physical Examination:     I had a face to face encounter with this patient and independently examined them on 1/1/2023 as outlined below:    Constitutional:  No acute distress. ENT:  Oral mucosa moist.    Resp:  No wheezing/ No accessory muscle use. CV:  Regular rhythm, normal rate, S1,S2.    GI/: Soft, no grimacing    Musculoskeletal:  pulses present, no cyanoais    Neurologic:  confused  Skin:  surgical site/dressing noted  Psych:  mildly agitated, but calms down without meds            Data Review:    Review and/or order of clinical lab test      Labs:     Recent Labs     12/30/22  0700   WBC 7.5   HGB 8.9*   HCT 27.9*        Recent Labs     01/01/23  0149 12/31/22  0858 12/30/22  0700   NA  --   --  137   K  --   --  4.6   CL  --   --  104   CO2  --   --  28   BUN  --   --  20   CREA  --   --  0.88   GLU  --   --  92   CA  --   --  9.5   MG 1.9 2.1 1.9   PHOS 3.3 3.9 3.2     No results for input(s): ALT, AP, TBIL, TBILI, TP, ALB, GLOB, GGT, AML, LPSE in the last 72 hours. No lab exists for component: SGOT, GPT, AMYP, HLPSE  No results for input(s): INR, PTP, APTT, INREXT, INREXT in the last 72 hours. No results for input(s): FE, TIBC, PSAT, FERR in the last 72 hours.    No results found for: FOL, RBCF   No results for input(s): PH, PCO2, PO2 in the last 72 hours. No results for input(s): CPK, CKNDX, TROIQ in the last 72 hours.     No lab exists for component: CPKMB  Lab Results   Component Value Date/Time    Cholesterol, total 194 10/05/2016 01:42 AM    HDL Cholesterol 48 10/05/2016 01:42 AM    LDL, calculated 117.2 (H) 10/05/2016 01:42 AM    Triglyceride 144 10/05/2016 01:42 AM    CHOL/HDL Ratio 4.0 10/05/2016 01:42 AM     Lab Results   Component Value Date/Time    Glucose (POC) 139 (H) 12/29/2022 05:06 PM    Glucose (POC) 94 12/29/2022 10:46 AM    Glucose (POC) 130 (H) 12/28/2022 04:10 PM    Glucose (POC) 111 12/28/2022 11:20 AM    Glucose (POC) 130 (H) 12/27/2022 04:16 PM     Lab Results   Component Value Date/Time    Color YELLOW/STRAW 12/26/2022 09:42 AM    Appearance CLEAR 12/26/2022 09:42 AM    Specific gravity 1.025 12/26/2022 09:42 AM    Specific gravity 1.010 12/22/2020 10:27 PM    pH (UA) 6.0 12/26/2022 09:42 AM    Protein TRACE (A) 12/26/2022 09:42 AM    Glucose Negative 12/26/2022 09:42 AM    Ketone Negative 12/26/2022 09:42 AM    Bilirubin Negative 12/26/2022 09:42 AM    Urobilinogen 0.2 12/26/2022 09:42 AM    Nitrites Negative 12/26/2022 09:42 AM    Leukocyte Esterase TRACE (A) 12/26/2022 09:42 AM    Epithelial cells FEW 12/26/2022 09:42 AM    Bacteria Negative 12/26/2022 09:42 AM    WBC 5-10 12/26/2022 09:42 AM    RBC 0-5 12/26/2022 09:42 AM         Medications Reviewed:     Current Facility-Administered Medications   Medication Dose Route Frequency    morphine injection 1 mg  1 mg IntraVENous Q4H PRN    oxyCODONE IR (ROXICODONE) tablet 2.5 mg  2.5 mg Oral Q4H PRN    naloxone (NARCAN) injection 0.4 mg  0.4 mg IntraVENous EVERY 2 MINUTES AS NEEDED    enoxaparin (LOVENOX) injection 40 mg  40 mg SubCUTAneous DAILY    sodium chloride (NS) flush 5-40 mL  5-40 mL IntraVENous Q8H    sodium chloride (NS) flush 5-40 mL  5-40 mL IntraVENous PRN    naloxone (NARCAN) injection 0.4 mg  0.4 mg IntraVENous PRN    calcium-vitamin D (OS-GERMAN +D3) 500 mg-200 unit per tablet 1 Tablet  1 Tablet Oral TID WITH MEALS    senna-docusate (PERICOLACE) 8.6-50 mg per tablet 1 Tablet  1 Tablet Oral BID    bisacodyL (DULCOLAX) suppository 10 mg  10 mg Rectal DAILY PRN    traMADoL (ULTRAM) tablet 50 mg  50 mg Oral Q6H PRN    hydrOXYzine HCL (ATARAX) tablet 10 mg  10 mg Oral Q8H PRN    sodium chloride (NS) flush 5-40 mL  5-40 mL IntraVENous Q8H    sodium chloride (NS) flush 5-40 mL  5-40 mL IntraVENous PRN    sodium chloride (NS) flush 5-40 mL  5-40 mL IntraVENous Q8H    sodium chloride (NS) flush 5-40 mL  5-40 mL IntraVENous PRN    polyethylene glycol (MIRALAX) packet 17 g  17 g Oral DAILY PRN    ondansetron (ZOFRAN ODT) tablet 4 mg  4 mg Oral Q8H PRN    Or    ondansetron (ZOFRAN) injection 4 mg  4 mg IntraVENous Q6H PRN    levothyroxine (SYNTHROID) tablet 112 mcg  112 mcg Oral ACB    acetaminophen (TYLENOL) tablet 650 mg  650 mg Oral Q6H    donepeziL (ARICEPT) tablet 10 mg  10 mg Oral QHS    QUEtiapine (SEROquel) tablet 12.5 mg  12.5 mg Oral QHS    traZODone (DESYREL) tablet 50 mg  50 mg Oral QHS PRN     ______________________________________________________________________  EXPECTED LENGTH OF STAY: 3d 4h  ACTUAL LENGTH OF STAY:          6                 Chandni Mejia MD

## 2023-01-02 VITALS
TEMPERATURE: 98.8 F | HEIGHT: 65 IN | RESPIRATION RATE: 18 BRPM | SYSTOLIC BLOOD PRESSURE: 125 MMHG | BODY MASS INDEX: 28.32 KG/M2 | WEIGHT: 170 LBS | DIASTOLIC BLOOD PRESSURE: 72 MMHG | HEART RATE: 74 BPM | OXYGEN SATURATION: 94 %

## 2023-01-02 LAB
ANION GAP SERPL CALC-SCNC: 5 MMOL/L (ref 5–15)
BASOPHILS # BLD: 0.1 K/UL (ref 0–0.1)
BASOPHILS NFR BLD: 1 % (ref 0–1)
BUN SERPL-MCNC: 26 MG/DL (ref 6–20)
BUN/CREAT SERPL: 25 (ref 12–20)
CALCIUM SERPL-MCNC: 9.6 MG/DL (ref 8.5–10.1)
CHLORIDE SERPL-SCNC: 103 MMOL/L (ref 97–108)
CO2 SERPL-SCNC: 29 MMOL/L (ref 21–32)
CREAT SERPL-MCNC: 1.05 MG/DL (ref 0.55–1.02)
DIFFERENTIAL METHOD BLD: ABNORMAL
EOSINOPHIL # BLD: 0.3 K/UL (ref 0–0.4)
EOSINOPHIL NFR BLD: 3 % (ref 0–7)
ERYTHROCYTE [DISTWIDTH] IN BLOOD BY AUTOMATED COUNT: 13.4 % (ref 11.5–14.5)
GLUCOSE SERPL-MCNC: 107 MG/DL (ref 65–100)
HCT VFR BLD AUTO: 30.4 % (ref 35–47)
HGB BLD-MCNC: 9.5 G/DL (ref 11.5–16)
IMM GRANULOCYTES # BLD AUTO: 0 K/UL (ref 0–0.04)
IMM GRANULOCYTES NFR BLD AUTO: 1 % (ref 0–0.5)
LYMPHOCYTES # BLD: 1.3 K/UL (ref 0.8–3.5)
LYMPHOCYTES NFR BLD: 15 % (ref 12–49)
MAGNESIUM SERPL-MCNC: 2 MG/DL (ref 1.6–2.4)
MCH RBC QN AUTO: 29.4 PG (ref 26–34)
MCHC RBC AUTO-ENTMCNC: 31.3 G/DL (ref 30–36.5)
MCV RBC AUTO: 94.1 FL (ref 80–99)
MONOCYTES # BLD: 0.8 K/UL (ref 0–1)
MONOCYTES NFR BLD: 9 % (ref 5–13)
NEUTS SEG # BLD: 6.2 K/UL (ref 1.8–8)
NEUTS SEG NFR BLD: 71 % (ref 32–75)
NRBC # BLD: 0 K/UL (ref 0–0.01)
NRBC BLD-RTO: 0 PER 100 WBC
PHOSPHATE SERPL-MCNC: 3.1 MG/DL (ref 2.6–4.7)
PLATELET # BLD AUTO: 288 K/UL (ref 150–400)
PMV BLD AUTO: 9.6 FL (ref 8.9–12.9)
POTASSIUM SERPL-SCNC: 4.3 MMOL/L (ref 3.5–5.1)
RBC # BLD AUTO: 3.23 M/UL (ref 3.8–5.2)
SODIUM SERPL-SCNC: 137 MMOL/L (ref 136–145)
WBC # BLD AUTO: 8.8 K/UL (ref 3.6–11)

## 2023-01-02 PROCEDURE — 80048 BASIC METABOLIC PNL TOTAL CA: CPT

## 2023-01-02 PROCEDURE — 36415 COLL VENOUS BLD VENIPUNCTURE: CPT

## 2023-01-02 PROCEDURE — 84100 ASSAY OF PHOSPHORUS: CPT

## 2023-01-02 PROCEDURE — 85025 COMPLETE CBC W/AUTO DIFF WBC: CPT

## 2023-01-02 PROCEDURE — 83735 ASSAY OF MAGNESIUM: CPT

## 2023-01-02 PROCEDURE — 74011250637 HC RX REV CODE- 250/637

## 2023-01-02 PROCEDURE — 74011250636 HC RX REV CODE- 250/636

## 2023-01-02 PROCEDURE — 74011250637 HC RX REV CODE- 250/637: Performed by: ORTHOPAEDIC SURGERY

## 2023-01-02 RX ORDER — GUAIFENESIN 100 MG/5ML
81 LIQUID (ML) ORAL DAILY
Qty: 30 TABLET | Refills: 0 | Status: SHIPPED
Start: 2023-01-02

## 2023-01-02 RX ORDER — TRAMADOL HYDROCHLORIDE 50 MG/1
50 TABLET ORAL
Qty: 10 TABLET | Refills: 0 | Status: SHIPPED | OUTPATIENT
Start: 2023-01-02 | End: 2023-01-05

## 2023-01-02 RX ORDER — AMOXICILLIN 250 MG
1 CAPSULE ORAL 2 TIMES DAILY
Qty: 30 TABLET | Refills: 0 | Status: SHIPPED
Start: 2023-01-02

## 2023-01-02 RX ADMIN — CALCIUM CARBONATE-VITAMIN D TAB 500 MG-200 UNIT 1 TABLET: 500-200 TAB at 12:22

## 2023-01-02 RX ADMIN — ACETAMINOPHEN 650 MG: 325 TABLET ORAL at 12:21

## 2023-01-02 RX ADMIN — SENNOSIDES AND DOCUSATE SODIUM 1 TABLET: 50; 8.6 TABLET ORAL at 12:22

## 2023-01-02 RX ADMIN — LEVOTHYROXINE SODIUM 112 MCG: 0.11 TABLET ORAL at 07:57

## 2023-01-02 RX ADMIN — TRAMADOL HYDROCHLORIDE 50 MG: 50 TABLET, COATED ORAL at 12:26

## 2023-01-02 RX ADMIN — ENOXAPARIN SODIUM 40 MG: 100 INJECTION SUBCUTANEOUS at 12:22

## 2023-01-02 RX ADMIN — ACETAMINOPHEN 650 MG: 325 TABLET ORAL at 00:00

## 2023-01-02 RX ADMIN — ACETAMINOPHEN 650 MG: 325 TABLET ORAL at 07:56

## 2023-01-02 NOTE — PROGRESS NOTES
Transition Of Care: The patient is discharging to [facility] today with [facility] transport at [address] [facility], Elinor Samson at [facility] has accepted the patient to room 116-A. RN to call report to: [phone]. RUR: 12%    CM called and spoke to daughter Korina Warren: [phone] and she agrees with plan. Private duty care giver Asad Lucero is bedside. 2nd IM medicare letter given by [employer] 1/1/23. Transition of Care Plan to SNF/Rehab    SNF/Rehab Transition:  Patient has been accepted to [facility] and meets criteria for admission. Patient will transported by [facility] and expected to leave at 1pm.    Communication to Patient/Family:  Met with patient and daughter, Humza Barry (identified care giver) and they are agreeable to the transition plan. Communication to SNF/Rehab:  Bedside RN, Paige Butler, has been notified to update the transition plan to the facility and call report (phone number [phone]). Discharge information has been updated on the AVS.     Discharge instructions to be fax'd to facility at [facility] # [phone]). Nursing Please include all hard scripts for controlled substances, med rec and dc summary, and AVS in packet.      Reviewed and confirmed with facility, Yue manage the patient care needs for the following:     Reviewed and confirmed with facility, Maya can manage the patient care needs for the following:     Sandip Garrett with (X) only those applicable:    Medication:  []  Medications will be available at the facility  []  IV Antibiotics  []  Controlled Substance - hard copy to be sent with patient   []  Weekly Labs   Documents:  [x] Hard RX  [x] MAR  [x] Kardex  [x] AVS  [x]Transfer Summary  [x]Discharge   Equipment:  []  CPAP/BiPAP  []  Wound Vacuum  []  Silvestre or Urinary Device  []  PICC/Central Line  []  Nebulizer  []  Ventilator   Treatment:  []Isolation (for MRSA, VRE, etc.)  []Surgical Drain Management  []Tracheostomy Care  []Dressing Changes  []Dialysis with transportation and chair time. []PEG Care  []Oxygen  []Daily Weights for Heart Failure   Dietary:  []Any diet limitations  []Tube Feedings   []Total Parenteral Management (TPN)   Eligible for Medicaid Long Term Services and Supports  Yes:  [] Eligible for medical assistance or will become eligible within 180 days and UAI completed. [] Provider/Patient and/or support system has requested screening. [] UAI copy provided to patient or responsible party. [] UAI unavailable at discharge will send once processed to SNF provider. [] UAI unavailable at discharged mailed to patient  No:   [] Private pay and is not financially eligible for Medicaid within the next 180 days. [] Reside out-of-state. [] A residents of a state owned/operated facility that is licensed  by [address] [facility] or [facility]  [] Enrollment in [facility] hospice services  [] [address] [place] [address]  [x] Patient /Family declines to have screening completed or provide financial information for screening     Financial Resources:  Medicaid    [] Initiated and application pending   [] Full coverage     Advanced Care Plan:  []Surrogate Decision Maker of Care  []POA  []Communicated Code Status DNR(DDNR\", \"Full\")    Other      CM following for discharge needs.     Suzanna Millan RN/CRM

## 2023-01-02 NOTE — PROGRESS NOTES
[facility] Adult  Hospitalist Group                                                                                          Hospitalist Progress Note  Jasson Lau NP  Answering service: [phone] OR [phone] from in house phone        Date of Service:  2023  NAME:  Kevin Montgomery  :  1930  MRN:  [id number]      Admission Summary:   Kevin Montgomery is a 80 y.o. female with a PMH of HLD, Hypothyroidism, Renal Cell Carcinoma s/p Left Nephrectomy, KAREN, Osteoarthritis, Rheumatoid Arthritis, Dementia, Cardiomyopathy, HFrEF and Obesity who presented to [facility] from [facility] unit for eval after a GLF on am of presentation, denied head injury, work- up revealed proximal left femur fracture and transferred to [facility] for Orthopedic Service eval, hospitalist service consulted for admit. Interval history / Subjective: Follow-up for issues listed below.   -Patient seen and examined, no c/o's. Assessment & Plan:     Fracture Left Proximal Femur:  - XR left hip w/wo : fracture proximal left femur.  - pain management  - Ortho Sx followin/26 s/p Left intertrochanteric hip fracture IM nail.   - PT/OT   - Fall precautions      Cardiomyopathy/HFrEF:   - CXR : Prominent size heart. Minimal vascular prominence. No focal infiltrate. - EKG : SR w/ PAC's. - ECHO MAGEN 2020: EF 40-45%     Hyponatremia: d/c IVF's, monitor lytes. CKD III/Urine retention:   - monitor renal function, renal dose meds, avoid nephrotoxic agents. - UA : negative  - bladder scan and insert purdy cath if urine retention ( straight cath for >400 mL on  per nurse)     AFIB: xarelto at home, held. Consider d/c risk vs benefit given falls. Dementia: supportive care lives in memory care unit, continue home regimen. Hypothyroidism: continue synthroid. Arthritis (RA and OA): pain control, [place].  Hx of renal cell carcinoma: left nephrectomy.    KAREN: supplemental O2, no CPAP at home.            DVTppx: lovenox  Code Status: DNR  Diet: Cardiac  Activity: OOB to chair TID and PRN  Discharge: return to [facility] NURY vs SNF  Ambulates: [place]     Hospital Problems  Date Reviewed: 1/12/2021            Codes Class Noted POA    Hip fracture [facility]) ICD-10-CM: I82.973V  ICD-9-CM: 820.8  12/26/2022 Unknown             Review of Systems:   A comprehensive review of systems was negative except for that written in the HPI. Vital Signs:    Last 24hrs VS reviewed since prior progress note. Most recent are:  Visit Vitals  /72 (BP 1 Location: Right upper arm, BP Patient Position: Lying)   Pulse 74   Temp 98.8 °F (37.1 °C)   Resp 18   Ht 5' 5\" (1.651 m)   Wt 77.1 kg (170 lb)   SpO2 94%   BMI 28.29 kg/m²         Intake/Output Summary (Last 24 hours) at 1/2/2023 1128  Last data filed at 1/1/2023 1648  Gross per 24 hour   Intake --   Output 400 ml   Net -400 ml        Physical Examination:     I had a face to face encounter with this patient and independently examined them on 1/2/2023 as outlined below:    Constitutional:  No acute distress. ENT:  Oral mucosa moist.    Resp:  CTA bilaterally. No wheezing/rhonchi/rales. No accessory muscle use. CV:  Regular rhythm, normal rate, S1,S2.    GI/:  Soft, slightly distended, non tender, no guarding, BS present. Silvestre patent, clr/yell. Musculoskeletal:  Trace BLE edema, warm. Neurologic:  Moves all extremities. AAOx2. Skin:  w/d. Surgical site dressing: CDI  Psych:  Not anxious nor agitated.      Data Review:    Review and/or order of clinical lab test      Labs:     Recent Labs     01/02/23 0318   WBC 8.8   HGB 9.5*   HCT 30.4*        Recent Labs     01/02/23 0318 01/01/23 0149 12/31/22  0858     --   --    K 4.3  --   --      --   --    CO2 29  --   --    BUN 26*  --   --    CREA 1.05*  --   --    *  --   --    CA 9.6  --   --    MG 2.0 1.9 2.1   PHOS 3.1 3.3 3.9     No results for input(s): ALT, AP, TBIL, TBILI, TP, ALB, GLOB, GGT, AML, LPSE in the last 72 hours. No lab exists for component: SGOT, GPT, AMYP, HLPSE  No results for input(s): INR, PTP, APTT, INREXT in the last 72 hours. No results for input(s): FE, TIBC, PSAT, FERR in the last 72 hours. No results found for: FOL, RBCF   No results for input(s): PH, PCO2, PO2 in the last 72 hours. No results for input(s): CPK, CKNDX, TROIQ in the last 72 hours.     No lab exists for component: CPKMB  Lab Results   Component Value Date/Time    Cholesterol, total 194 10/05/2016 01:42 AM    HDL Cholesterol 48 10/05/2016 01:42 AM    LDL, calculated 117.2 (H) 10/05/2016 01:42 AM    Triglyceride 144 10/05/2016 01:42 AM    CHOL/HDL Ratio 4.0 10/05/2016 01:42 AM     Lab Results   Component Value Date/Time    Glucose (POC) 139 (H) 12/29/2022 05:06 PM    Glucose (POC) 94 12/29/2022 10:46 AM    Glucose (POC) 130 (H) 12/28/2022 04:10 PM    Glucose (POC) 111 12/28/2022 11:20 AM    Glucose (POC) 130 (H) 12/27/2022 04:16 PM     Lab Results   Component Value Date/Time    Color YELLOW/STRAW 12/26/2022 09:42 AM    Appearance CLEAR 12/26/2022 09:42 AM    Specific gravity 1.025 12/26/2022 09:42 AM    Specific gravity 1.010 12/22/2020 10:27 PM    pH (UA) 6.0 12/26/2022 09:42 AM    Protein TRACE (A) 12/26/2022 09:42 AM    Glucose Negative 12/26/2022 09:42 AM    Ketone Negative 12/26/2022 09:42 AM    Bilirubin Negative 12/26/2022 09:42 AM    Urobilinogen 0.2 12/26/2022 09:42 AM    Nitrites Negative 12/26/2022 09:42 AM    Leukocyte Esterase TRACE (A) 12/26/2022 09:42 AM    Epithelial cells FEW 12/26/2022 09:42 AM    Bacteria Negative 12/26/2022 09:42 AM    WBC 5-10 12/26/2022 09:42 AM    RBC 0-5 12/26/2022 09:42 AM         Medications Reviewed:     Current Facility-Administered Medications   Medication Dose Route Frequency    morphine injection 1 mg  1 mg IntraVENous Q4H PRN    oxyCODONE IR (ROXICODONE) tablet 2.5 mg  2.5 mg Oral Q4H PRN    enoxaparin (LOVENOX) injection 40 mg  40 mg SubCUTAneous DAILY    sodium chloride (NS) flush 5-40 mL  5-40 mL IntraVENous Q8H    sodium chloride (NS) flush 5-40 mL  5-40 mL IntraVENous PRN    naloxone (NARCAN) injection 0.4 mg  0.4 mg IntraVENous PRN    calcium-vitamin D (OS-GERMAN +D3) 500 mg-200 unit per tablet 1 Tablet  1 Tablet Oral TID WITH MEALS    senna-docusate (PERICOLACE) 8.6-50 mg per tablet 1 Tablet  1 Tablet Oral BID    bisacodyL (DULCOLAX) suppository 10 mg  10 mg Rectal DAILY PRN    traMADoL (ULTRAM) tablet 50 mg  50 mg Oral Q6H PRN    hydrOXYzine HCL (ATARAX) tablet 10 mg  10 mg Oral Q8H PRN    sodium chloride (NS) flush 5-40 mL  5-40 mL IntraVENous PRN    sodium chloride (NS) flush 5-40 mL  5-40 mL IntraVENous PRN    polyethylene glycol (MIRALAX) packet 17 g  17 g Oral DAILY PRN    ondansetron (ZOFRAN ODT) tablet 4 mg  4 mg Oral Q8H PRN    Or    ondansetron (ZOFRAN) injection 4 mg  4 mg IntraVENous Q6H PRN    levothyroxine (SYNTHROID) tablet 112 mcg  112 mcg Oral ACB    acetaminophen (TYLENOL) tablet 650 mg  650 mg Oral Q6H    donepeziL (ARICEPT) tablet 10 mg  10 mg Oral QHS    QUEtiapine (SEROquel) tablet 12.5 mg  12.5 mg Oral QHS    traZODone (DESYREL) tablet 50 mg  50 mg Oral QHS PRN     ______________________________________________________________________  EXPECTED LENGTH OF STAY: 3d 4h  ACTUAL LENGTH OF STAY:          7                 Maite Latham NP

## 2023-01-02 NOTE — DISCHARGE SUMMARY
Discharge Summary       PATIENT ID: Alex Dewey  MRN: [id number]   YOB: 1930    DATE OF ADMISSION: 2022  7:01 AM    DATE OF DISCHARGE: 2023  PRIMARY CARE PROVIDER: Guadalupe Holland MD     ATTENDING PHYSICIAN: Lillian Chandler MD  DISCHARGING PROVIDER: Abdon Josue NP    To contact this individual call [phone] and ask the  to page. If unavailable ask to be transferred the Adult Hospitalist Department. CONSULTATIONS: IP CONSULT TO HOSPITALIST  IP CONSULT TO ORTHOPEDIC SURGERY    PROCEDURES/SURGERIES: Procedure(s):  SYNTHES TFN NAIL    DISCHARGE DIAGNOSES:     Fracture Left Proximal Femur 2/ to GLF (s/p Left intertrochanteric hip fracture IM nail), Cardiomyopathy, HFrEF, Hyponatremia (resolved), CKD III, Urine Retention (resolved), Dementia, Hypothryoidism, Osteo/Rheumatoid Arthritis, hx Renal Cell Cancer- Left Nephrectomy, KAREN (can not tolerate CPAP) and obesity    ADMISSION SUMMARY AND HOSPITAL COURSE:   Alex Dewey is a 80 y.o. female with a PMH of HLD, Hypothyroidism, Renal Cell Carcinoma s/p Left Nephrectomy, KAREN, Osteoarthritis, Rheumatoid Arthritis, Dementia, Cardiomyopathy, HFrEF and Obesity who presented to [facility] from [facility] unit for eval after a GLF on am of presentation, denied head injury, work- up revealed proximal left femur fracture and transferred to [facility] for Orthopedic Service eval, hospitalist service consulted for admit. DISCHARGE DIAGNOSES / PLAN:      Fracture Left Proximal Femur:  - XR left hip w/wo : fracture proximal left femur.  - pain management  - Ortho Sx followin/26 s/p Left intertrochanteric hip fracture IM nail.   - PT/OT   - Fall precautions      Cardiomyopathy/HFrEF:   - CXR : Prominent size heart. Minimal vascular prominence. No focal infiltrate. - EKG : SR w/ PAC's. - ECHO MAGEN 2020: EF 40-45%     Hyponatremia: d/c IVF's, monitor lytes.       CKD III/Urine retention:   - monitor renal function, renal dose meds, avoid nephrotoxic agents. - UA 12/26: negative  - bladder scan and insert purdy cath if urine retention ( straight cath for >400 mL on 12/27 per nurse), voiding trial out patient     AFIB: xarelto at home, upon review for discharge- no record patient was on this at home per pharmacy review. Likely d/c/d d/t risk vs benefit given falls. ASA 81mg at discharge. Dementia: supportive care lives in memory care unit, continue home regimen. Hypothyroidism: continue synthroid. Arthritis (RA and OA): pain control, Oscal.  Hx of renal cell carcinoma: left nephrectomy. KAREN: supplemental O2, no CPAP at home. BMI: Body mass index is 28.29 kg/m². . This patient: Meets criteria for overweight given BMI >/= 25 and < 30 due to excess calories/nutritional. Weight loss and lifestyle modifications should be encouraged as an outpatient. PENDING TEST RESULTS:   At the time of discharge the following test results are still pending: none. ADDITIONAL CARE RECOMMENDATIONS:     You have been deemed stable for discharge and are being discharged to [facility]. You are taking a blood thinner- should you develop earnest abnormal or prolonged bleeding- seek care at nearest ER. Should you need medication refills beyond what we have prescribed for you, you will need to contact your primary care provider for refills. Return to the ER or notify your primary care office if you have a fever greater than 101.5 associated with chills, chest pain, or signs and symptoms of a stroke which are:  B E F A S T  -loss of balance, headaches or dizziness  -eyes blurred vision (sudden)  -asymmetrical facial symmetry (side of face droops)  -arms and leg weakness  -slurred speech / difficulty speaking  -if you experience any of these (time to call for an ambulance)      NOTIFY YOUR PHYSICIAN FOR ANY OF THE FOLLOWING:   Fever over 101 degrees for 24 hours.    Chest pain, shortness of breath, fever, chills, nausea, vomiting, diarrhea, change in mentation, falling, weakness, bleeding. Severe pain or pain not relieved by medications, as well as any other signs or symptoms that you may have questions about. FOLLOW UP APPOINTMENTS:    Follow-up Information    None            DIET: Cardiac Diet    ACTIVITY: PT/OT Eval and Treat  Walk with your walker WBAT weight bearing as instructed by your physical therapist. Continue using your walker until seen for follow-up visit. Practice your exercises 3 times a day as instructed by the physical therapist.  Get up frequently and walk (with assistance as needed)  You may not drive     EQUIPMENT needed: per PT/OT recommendations. WOUND/INCISION CARE:  Keep a dressing on your incision and change daily. Once your incision is not draining, you may leave it open to air. Wash hands thoroughly before changing the dressing. You may take a shower when your incision is dry. Do not take a tub bath or go swimming      DISCHARGE MEDICATIONS:  Current Discharge Medication List        START taking these medications    Details   traMADoL (ULTRAM) 50 mg tablet Take 1 Tablet by mouth every six (6) hours as needed for Pain for up to 3 days. Max Daily Amount: 200 mg. Qty: 10 Tablet, Refills: 0  Start date: 1/2/2023, End date: 1/5/2023    Associated Diagnoses: Fracture, proximal femur, left, closed, initial encounter ([facility] [address]      senna-docusate (PERICOLACE) 8.6-50 mg per tablet Take 1 Tablet by mouth two (2) times a day. Qty: 30 Tablet, Refills: 0  Start date: 1/2/2023      aspirin 81 mg chewable tablet Take 1 Tablet by mouth daily. Qty: 30 Tablet, Refills: 0  Start date: 1/2/2023           CONTINUE these medications which have NOT CHANGED    Details   acetaminophen (TYLENOL) 500 mg tablet Take 500 mg by mouth every twelve (12) hours as needed for Pain. clonazePAM (KlonoPIN) 0.5 mg tablet Take 0.5 mg by mouth daily as needed for Anxiety.       docusate sodium (COLACE) 100 mg capsule Take 100 mg by mouth two (2) times daily as needed for Constipation. donepeziL (ARICEPT) 10 mg tablet Take 10 mg by mouth nightly. PHENobarbitaL 15 mg tablet Take 15 mg by mouth daily as needed (tremors). Do not use clonazepam and phenobarb on same day      QUEtiapine (SEROqueL) 25 mg tablet Take 25 mg by mouth nightly. traZODone (DESYREL) 50 mg tablet Take 50 mg by mouth nightly. cyanocobalamin 1,000 mcg tablet Take 1,000 mcg by mouth every seven (7) days. Q Mon      levothyroxine (SYNTHROID) 112 mcg tablet Take 112 mcg by mouth Daily (before breakfast). patiromer calcium sorbitex (Veltassa) 8.4 gram powder Take 8.4 g by mouth daily. Calcium-Cholecalciferol, D3, (Calcium 600 with Vitamin D3) 600 mg(1,500mg) -400 unit chew Take 1 Tab by mouth daily. DISPOSITION:    Home With:   OT  PT  HH  RN      X Long term SNF/Inpatient Rehab: [address] [place] [address] SNF    Independent/assisted living    Hospice    Other:       PATIENT CONDITION AT DISCHARGE:     Functional status    Poor    X Deconditioned     Independent      Cognition     Lucid     Forgetful    X Dementia      Catheters/lines (plus indication)   X Silvestre     PICC     PEG     None      Code status     Full code    X DNR      PHYSICAL EXAMINATION AT DISCHARGE:    Constitutional:  No acute distress. ENT:  Oral mucosa moist.    Resp:  CTA bilaterally. No wheezing/rhonchi/rales. No accessory muscle use. CV:  Regular rhythm, normal rate, S1,S2.    GI/:  Soft, slightly distended, non tender, no guarding, BS present. Silvestre patent, clr/yell. Musculoskeletal:  Trace BLE edema, warm. Neurologic:  Moves all extremities. AAOx2. Skin:  w/d. Surgical site dressing: CDI  Psych:  Not anxious nor agitated.          CHRONIC MEDICAL DIAGNOSES:  Problem List as of 1/2/2023 Date Reviewed: 1/12/2021            Codes Class Noted - Resolved    Hip fracture ([facility] ICD-10-CM: V41.126S  ICD-9-CM: 820.8  12/26/2022 - Present        Dementia ([facility] [address] [facility] ICD-10-CM: F03.90  ICD-9-CM: 294.20  12/23/2020 - Present        Cardiomyopathy ([address] ICD-10-CM: I42.9  ICD-9-CM: 425.4  12/23/2020 - Present        Atrial flutter with rapid ventricular response (HCC) ICD-10-CM: I48.92  ICD-9-CM: 427.32  12/22/2020 - Present        Sinus arrhythmia ICD-10-CM: I49.8  ICD-9-CM: 427.89  10/6/2016 - Present        PAC (premature atrial contraction) ICD-10-CM: I49.1  ICD-9-CM: 427.61  10/6/2016 - Present        Intractable back pain ICD-10-CM: M54.9  ICD-9-CM: 724.5  10/5/2016 - Present        Abdominal pain ICD-10-CM: R10.9  ICD-9-CM: 789.00  4/25/2012 - Present        Dysuria ICD-10-CM: R30.0  ICD-9-CM: 788.1  4/6/2012 - Present        Personal history of renal cancer ICD-10-CM: Z85.528  ICD-9-CM: V10.52  4/6/2012 - Present           Greater than 30 minutes were spent with the patient on counseling and coordination of care    Signed:   Laila Hewitt NP  1/2/2023  11:40 AM

## 2023-01-02 NOTE — PROGRESS NOTES
Bedside shift change report given to maranda (oncoming nurse) by nas (offgoing nurse). Report included the following information SBAR, Kardex, Intake/Output, and MAR.

## 2023-01-02 NOTE — DISCHARGE SUMMARY
Discharge Summary       PATIENT ID: Osito Keller  MRN: [id number]   YOB: 1930    DATE OF ADMISSION: 2022  7:01 AM    DATE OF DISCHARGE: 2023  PRIMARY CARE PROVIDER: Lavelle Cho MD     ATTENDING PHYSICIAN: Loretta Rogel MD  DISCHARGING PROVIDER: Mandi Nelson NP    To contact this individual call [phone] and ask the  to page. If unavailable ask to be transferred the Adult Hospitalist Department. CONSULTATIONS: IP CONSULT TO HOSPITALIST  IP CONSULT TO ORTHOPEDIC SURGERY    PROCEDURES/SURGERIES: Procedure(s):  SYNTHES TFN NAIL    DISCHARGE DIAGNOSES:     Fracture Left Proximal Femur / to GLF (s/p Left intertrochanteric hip fracture IM nail), Cardiomyopathy, HFrEF, Hyponatremia (resolved), CKD III, Urine Retention (resolved), Dementia, Hypothryoidism, Osteo/Rheumatoid Arthritis, hx Renal Cell Cancer- Left Nephrectomy, KAREN (can not tolerate CPAP) and obesity    ADMISSION SUMMARY AND HOSPITAL COURSE:   Osito Keller is a 80 y.o. female with a PMH of HLD, Hypothyroidism, Renal Cell Carcinoma s/p Left Nephrectomy, KAREN, Osteoarthritis, Rheumatoid Arthritis, Dementia, Cardiomyopathy, HFrEF and Obesity who presented to [facility] from [facility] unit for eval after a GLF on am of presentation, denied head injury, work- up revealed proximal left femur fracture and transferred to [facility] for Orthopedic Service eval, hospitalist service consulted for admit. DISCHARGE DIAGNOSES / PLAN:      Fracture Left Proximal Femur:  - XR left hip w/wo : fracture proximal left femur.  - pain management  - Ortho Sx followin/26 s/p Left intertrochanteric hip fracture IM nail.   - PT/OT   - Fall precautions      Cardiomyopathy/HFrEF:   - CXR : Prominent size heart. Minimal vascular prominence. No focal infiltrate. - EKG : SR w/ PAC's. - ECHO MAGEN 2020: EF 40-45%     Hyponatremia: d/c IVF's, monitor lytes.       CKD III/Urine retention:   - monitor renal function, renal dose meds, avoid nephrotoxic agents. - UA 12/26: negative  - bladder scan and insert purdy cath if urine retention ( straight cath for >400 mL on 12/27 per nurse), voiding trial passed, d/c purdy 12/31     AFIB: xarelto at home, upon review for discharge- no record patient was on this at home per pharmacy review. Likely d/c/d d/t risk vs benefit given falls. ASA 81mg at discharge. Dementia: supportive care lives in memory care unit, continue home regimen. Hypothyroidism: continue synthroid. Arthritis (RA and OA): pain control, Oscal.  Hx of renal cell carcinoma: left nephrectomy. KAREN: supplemental O2, no CPAP at home. BMI: Body mass index is 28.29 kg/m². . This patient: Meets criteria for overweight given BMI >/= 25 and < 30 due to excess calories/nutritional. Weight loss and lifestyle modifications should be encouraged as an outpatient. PENDING TEST RESULTS:   At the time of discharge the following test results are still pending: none. ADDITIONAL CARE RECOMMENDATIONS:     You have been deemed stable for discharge and are being discharged to [facility]. You are taking a blood thinner- should you develop earnest abnormal or prolonged bleeding- seek care at nearest ER. Should you need medication refills beyond what we have prescribed for you, you will need to contact your primary care provider for refills. Return to the ER or notify your primary care office if you have a fever greater than 101.5 associated with chills, chest pain, or signs and symptoms of a stroke which are:  B E F A S T  -loss of balance, headaches or dizziness  -eyes blurred vision (sudden)  -asymmetrical facial symmetry (side of face droops)  -arms and leg weakness  -slurred speech / difficulty speaking  -if you experience any of these (time to call for an ambulance)      NOTIFY YOUR PHYSICIAN FOR ANY OF THE FOLLOWING:   Fever over 101 degrees for 24 hours.    Chest pain, shortness of breath, fever, chills, nausea, vomiting, diarrhea, change in mentation, falling, weakness, bleeding. Severe pain or pain not relieved by medications, as well as any other signs or symptoms that you may have questions about. FOLLOW UP APPOINTMENTS:    Follow-up Information       Follow up With Specialties Details Why Contact Info    Janis Paulino MD General Practice   [address]  [phone]      Therese Mcardle, MD Orthopedic Surgery Follow up in 2 week(s)  [address]  [facility]  [facility] [address] [phone]                 DIET: Cardiac Diet    ACTIVITY: PT/OT Eval and Treat  Walk with your walker WBAT weight bearing as instructed by your physical therapist. Continue using your walker until seen for follow-up visit. Practice your exercises 3 times a day as instructed by the physical therapist.  Get up frequently and walk (with assistance as needed)  You may not drive     EQUIPMENT needed: per PT/OT recommendations. WOUND/INCISION CARE:  Keep a dressing on your incision and change daily. Once your incision is not draining, you may leave it open to air. Wash hands thoroughly before changing the dressing. You may take a shower when your incision is dry. Do not take a tub bath or go swimming      DISCHARGE MEDICATIONS:  Current Discharge Medication List        START taking these medications    Details   traMADoL (ULTRAM) 50 mg tablet Take 1 Tablet by mouth every six (6) hours as needed for Pain for up to 3 days. Max Daily Amount: 200 mg. Qty: 10 Tablet, Refills: 0  Start date: 1/2/2023, End date: 1/5/2023    Associated Diagnoses: Fracture, proximal femur, left, closed, initial encounter ([facility]      senna-docusate (PERICOLACE) 8.6-50 mg per tablet Take 1 Tablet by mouth two (2) times a day. Qty: 30 Tablet, Refills: 0  Start date: 1/2/2023      aspirin 81 mg chewable tablet Take 1 Tablet by mouth daily.   Qty: 30 Tablet, Refills: 0  Start date: 1/2/2023           CONTINUE these medications which have NOT CHANGED    Details   acetaminophen (TYLENOL) 500 mg tablet Take 500 mg by mouth every twelve (12) hours as needed for Pain. clonazePAM (KlonoPIN) 0.5 mg tablet Take 0.5 mg by mouth daily as needed for Anxiety. docusate sodium (COLACE) 100 mg capsule Take 100 mg by mouth two (2) times daily as needed for Constipation. donepeziL (ARICEPT) 10 mg tablet Take 10 mg by mouth nightly. PHENobarbitaL 15 mg tablet Take 15 mg by mouth daily as needed (tremors). Do not use clonazepam and phenobarb on same day      QUEtiapine (SEROqueL) 25 mg tablet Take 25 mg by mouth nightly. traZODone (DESYREL) 50 mg tablet Take 50 mg by mouth nightly. cyanocobalamin 1,000 mcg tablet Take 1,000 mcg by mouth every seven (7) days. Q Mon      levothyroxine (SYNTHROID) 112 mcg tablet Take 112 mcg by mouth Daily (before breakfast). patiromer calcium sorbitex (Veltassa) 8.4 gram powder Take 8.4 g by mouth daily. Calcium-Cholecalciferol, D3, (Calcium 600 with Vitamin D3) 600 mg(1,500mg) -400 unit chew Take 1 Tab by mouth daily. DISPOSITION:    Home With:   OT  PT  HH  RN      X Long term SNF/Inpatient Rehab: [address] [place] [address] SNF    Independent/assisted living    Hospice    Other:       PATIENT CONDITION AT DISCHARGE:     Functional status    Poor    X Deconditioned     Independent      Cognition     Lucid     Forgetful    X Dementia      Catheters/lines (plus indication)    Silvestre     PICC     PEG    X None      Code status     Full code    X DNR      PHYSICAL EXAMINATION AT DISCHARGE:    Constitutional:  No acute distress. ENT:  Oral mucosa moist.    Resp:  CTA bilaterally. No wheezing/rhonchi/rales. No accessory muscle use. CV:  Regular rhythm, normal rate, S1,S2.    GI/:  Soft, slightly distended, non tender, no guarding, BS present. Voids freely. Musculoskeletal:  Trace BLE edema, warm. Neurologic:  Moves all extremities. AAOx2.  Skin:  w/d. Surgical site dressing: CDI  Psych:  Not anxious nor agitated.          CHRONIC MEDICAL DIAGNOSES:  Problem List as of 1/2/2023 Date Reviewed: 1/12/2021            Codes Class Noted - Resolved    Hip fracture ([facility] ICD-10-CM: U78.390Q  ICD-9-CM: 820.8  12/26/2022 - Present        Dementia ([facility] ICD-10-CM: F03.90  ICD-9-CM: 294.20  12/23/2020 - Present        Cardiomyopathy ([facility] ICD-10-CM: I42.9  ICD-9-CM: 425.4  12/23/2020 - Present        Atrial flutter with rapid ventricular response ([facility] ICD-10-CM: I48.92  ICD-9-CM: 427.32  12/22/2020 - Present        Sinus arrhythmia ICD-10-CM: I49.8  ICD-9-CM: 427.89  10/6/2016 - Present        PAC (premature atrial contraction) ICD-10-CM: I49.1  ICD-9-CM: 427.61  10/6/2016 - Present        Intractable back pain ICD-10-CM: M54.9  ICD-9-CM: 724.5  10/5/2016 - Present        Abdominal pain ICD-10-CM: R10.9  ICD-9-CM: 789.00  4/25/2012 - Present        Dysuria ICD-10-CM: R30.0  ICD-9-CM: 788.1  4/6/2012 - Present        Personal history of renal cancer ICD-10-CM: Z85.528  ICD-9-CM: V10.52  4/6/2012 - Present         Greater than 30 minutes were spent with the patient on counseling and coordination of care    Signed:   Chico Pierre NP  1/2/2023  11:40 AM

## 2023-01-02 NOTE — PROGRESS NOTES
After 3 attempts did reach a nurse and gave d/c report. I have reviewed discharge instructions with the nurse VARINDER She verbalized understanding. DNR was in packet.

## (undated) DEVICE — INTRODUCER SHTH J 0.038 IN 3 MM 8 FRX60 CM DIL FAST-CATH

## (undated) DEVICE — SUTURE MCRYL SZ 3-0 L27IN ABSRB UD L19MM PS-2 3/8 CIR PRIM Y427H

## (undated) DEVICE — GOWN,SIRUS,NONRNF,SETINSLV,XL,20/CS: Brand: MEDLINE

## (undated) DEVICE — DRESSING PETRO W3XL8IN N ADH OIL EMUL GZ CURAD

## (undated) DEVICE — Device

## (undated) DEVICE — SUTURE ABSORBABLE BRAIDED 2-0 CT-1 27 IN UD VICRYL J259H

## (undated) DEVICE — CATH EP CRV 7F DUO 2/8 2M LG -- LIVEWIRE STRL

## (undated) DEVICE — ASTOUND STANDARD SURGICAL GOWN, XXL: Brand: CONVERTORS

## (undated) DEVICE — REM POLYHESIVE ADULT PATIENT RETURN ELECTRODE: Brand: VALLEYLAB

## (undated) DEVICE — PINNACLE INTRODUCER SHEATH: Brand: PINNACLE

## (undated) DEVICE — GLOVE ORANGE PI 7   MSG9070

## (undated) DEVICE — GUIDEWIRE ORTH L400MM DIA3.2MM FOR TFN

## (undated) DEVICE — MEDI-TRACE CADENCE ADULT, DEFIBRILLATION ELECTRODE -RTS  (10 PR/PK) - PHILIPS: Brand: MEDI-TRACE CADENCE

## (undated) DEVICE — PREP SKN CHLRAPRP APL 26ML STR --

## (undated) DEVICE — 3M™ IOBAN™ 2 ANTIMICROBIAL INCISE DRAPE 6648EZ: Brand: IOBAN™ 2

## (undated) DEVICE — Z INACTIVE USE 2854097 SPONGE GZ W4XL4IN COT 12 PLY TYP VII WVN C FLD DSGN

## (undated) DEVICE — PENCIL SMK EVAC 10 FT BLADE ELECTRD ROCKER FOR TELSCP

## (undated) DEVICE — PACK PROCEDURE SURG HRT CATH

## (undated) DEVICE — CATH NAVISTAR BIDIR FJ 8MM --

## (undated) DEVICE — 4-PORT MANIFOLD: Brand: NEPTUNE 2

## (undated) DEVICE — AIRLIFE™ ADULT CUSHION NASAL CANNULA 14 FOOT (4.3) CRUSH-RESISTANT OXYGEN TUBING, AND U/CONNECT-IT ADAPTER: Brand: AIRLIFE™

## (undated) DEVICE — 6619 2 PTNT ISO SYS INCISE AREA&LT;(&GT;&&LT;)&GT;P: Brand: STERI-DRAPE™ IOBAN™ 2

## (undated) DEVICE — SOLUTION IRRIG 1000ML STRL H2O USP PLAS POUR BTL

## (undated) DEVICE — CABLE RMFG EP MAP NAVISTAR RED -- F/CARTO 3 SYS - OEM 323590

## (undated) DEVICE — KIT EVAC 0.13IN RECT TB DIA10FR 400CC PVC 3 SPR Y CONN DRN

## (undated) DEVICE — GLOVE SURG SZ 65 THK91MIL LTX FREE SYN POLYISOPRENE

## (undated) DEVICE — PACK,BASIC,SIRUS,V: Brand: MEDLINE

## (undated) DEVICE — GLOVE ORANGE PI 7 1/2   MSG9075

## (undated) DEVICE — SOLUTION IRRIG 1000ML 0.9% SOD CHL USP POUR PLAS BTL

## (undated) DEVICE — PATCH CARTO 3 EXT REF --

## (undated) DEVICE — SUTURE VCRL SZ 0 L36IN ABSRB VLT L36MM CT-1 1/2 CIR J346H

## (undated) DEVICE — BASIN ST MAJOR-NO CAUTERY: Brand: MEDLINE INDUSTRIES, INC.

## (undated) DEVICE — 4.2MM THREE-FLUTED DRILL BIT QC/330MM/100MM CALIBRATION